# Patient Record
Sex: FEMALE | Race: WHITE | Employment: OTHER | URBAN - METROPOLITAN AREA
[De-identification: names, ages, dates, MRNs, and addresses within clinical notes are randomized per-mention and may not be internally consistent; named-entity substitution may affect disease eponyms.]

---

## 2017-02-09 ENCOUNTER — HOSPITAL ENCOUNTER (OUTPATIENT)
Dept: MRI IMAGING | Age: 77
Discharge: HOME | End: 2017-02-09
Attending: INTERNAL MEDICINE | Admitting: INTERNAL MEDICINE

## 2017-02-09 DIAGNOSIS — M54.16 RADICULOPATHY OF LUMBAR REGION: ICD-10-CM

## 2017-02-09 DIAGNOSIS — M54.16 LUMBAR RADICULOPATHY: ICD-10-CM

## 2017-02-23 ENCOUNTER — HOSPITAL ENCOUNTER (OUTPATIENT)
Dept: OTHER | Age: 77
Discharge: HOME | End: 2017-02-23
Attending: INTERNAL MEDICINE | Admitting: INTERNAL MEDICINE

## 2017-02-23 DIAGNOSIS — R06.02 BREATH SHORTNESS: ICD-10-CM

## 2017-05-31 ENCOUNTER — HOSPITAL ENCOUNTER (OUTPATIENT)
Dept: VASCULAR LAB | Age: 77
Discharge: HOME | End: 2017-05-31
Attending: INTERNAL MEDICINE | Admitting: INTERNAL MEDICINE

## 2017-05-31 ENCOUNTER — OFFICE VISIT (OUTPATIENT)
Dept: CARDIOLOGY CLINIC | Age: 77
End: 2017-05-31

## 2017-05-31 VITALS
RESPIRATION RATE: 16 BRPM | BODY MASS INDEX: 36.8 KG/M2 | DIASTOLIC BLOOD PRESSURE: 80 MMHG | HEART RATE: 82 BPM | WEIGHT: 200 LBS | HEIGHT: 62 IN | SYSTOLIC BLOOD PRESSURE: 130 MMHG

## 2017-05-31 DIAGNOSIS — R06.02 SHORTNESS OF BREATH: ICD-10-CM

## 2017-05-31 DIAGNOSIS — Z86.718 PERSONAL HISTORY OF OTHER VENOUS THROMBOSIS AND EMBOLISM: ICD-10-CM

## 2017-05-31 DIAGNOSIS — G47.33 OSA (OBSTRUCTIVE SLEEP APNEA): ICD-10-CM

## 2017-05-31 DIAGNOSIS — R60.9 EDEMA, UNSPECIFIED TYPE: Primary | ICD-10-CM

## 2017-05-31 DIAGNOSIS — I10 ESSENTIAL HYPERTENSION: ICD-10-CM

## 2017-05-31 DIAGNOSIS — C64.2 MALIGNANT NEOPLASM OF LEFT KIDNEY (HCC): ICD-10-CM

## 2017-05-31 DIAGNOSIS — I35.1 AORTIC VALVE INSUFFICIENCY, UNSPECIFIED ETIOLOGY: Primary | ICD-10-CM

## 2017-05-31 PROCEDURE — G8399 PT W/DXA RESULTS DOCUMENT: HCPCS | Performed by: NURSE PRACTITIONER

## 2017-05-31 PROCEDURE — 4040F PNEUMOC VAC/ADMIN/RCVD: CPT | Performed by: NURSE PRACTITIONER

## 2017-05-31 PROCEDURE — G8417 CALC BMI ABV UP PARAM F/U: HCPCS | Performed by: NURSE PRACTITIONER

## 2017-05-31 PROCEDURE — G8427 DOCREV CUR MEDS BY ELIG CLIN: HCPCS | Performed by: NURSE PRACTITIONER

## 2017-05-31 PROCEDURE — 1123F ACP DISCUSS/DSCN MKR DOCD: CPT | Performed by: NURSE PRACTITIONER

## 2017-05-31 PROCEDURE — 1036F TOBACCO NON-USER: CPT | Performed by: NURSE PRACTITIONER

## 2017-05-31 PROCEDURE — 99214 OFFICE O/P EST MOD 30 MIN: CPT | Performed by: NURSE PRACTITIONER

## 2017-05-31 PROCEDURE — 1090F PRES/ABSN URINE INCON ASSESS: CPT | Performed by: NURSE PRACTITIONER

## 2017-06-22 ENCOUNTER — HOSPITAL ENCOUNTER (OUTPATIENT)
Dept: NON INVASIVE DIAGNOSTICS | Age: 77
Discharge: HOME | End: 2017-06-22
Attending: INTERNAL MEDICINE | Admitting: INTERNAL MEDICINE

## 2017-06-22 ENCOUNTER — OFFICE VISIT (OUTPATIENT)
Dept: CARDIOLOGY CLINIC | Age: 77
End: 2017-06-22

## 2017-06-22 VITALS
OXYGEN SATURATION: 96 % | HEART RATE: 88 BPM | DIASTOLIC BLOOD PRESSURE: 60 MMHG | WEIGHT: 203 LBS | HEIGHT: 62 IN | SYSTOLIC BLOOD PRESSURE: 110 MMHG | BODY MASS INDEX: 37.36 KG/M2

## 2017-06-22 DIAGNOSIS — C64.2 MALIGNANT NEOPLASM OF LEFT KIDNEY (HCC): ICD-10-CM

## 2017-06-22 DIAGNOSIS — G47.33 OSA (OBSTRUCTIVE SLEEP APNEA): ICD-10-CM

## 2017-06-22 DIAGNOSIS — I35.1 AORTIC VALVE INSUFFICIENCY, UNSPECIFIED ETIOLOGY: Primary | ICD-10-CM

## 2017-06-22 DIAGNOSIS — R06.02 SHORTNESS OF BREATH: ICD-10-CM

## 2017-06-22 DIAGNOSIS — I10 ESSENTIAL HYPERTENSION: ICD-10-CM

## 2017-06-22 DIAGNOSIS — Z86.718 PERSONAL HISTORY OF OTHER VENOUS THROMBOSIS AND EMBOLISM: ICD-10-CM

## 2017-06-22 LAB
LEFT VENTRICULAR EJECTION FRACTION HIGH VALUE: 55 %
LEFT VENTRICULAR EJECTION FRACTION MODE: NORMAL
LV EF: 50 %
LV EF: 53 %
LVEF MODALITY: NORMAL

## 2017-06-22 PROCEDURE — G8417 CALC BMI ABV UP PARAM F/U: HCPCS | Performed by: NURSE PRACTITIONER

## 2017-06-22 PROCEDURE — G8427 DOCREV CUR MEDS BY ELIG CLIN: HCPCS | Performed by: NURSE PRACTITIONER

## 2017-06-22 PROCEDURE — 1123F ACP DISCUSS/DSCN MKR DOCD: CPT | Performed by: NURSE PRACTITIONER

## 2017-06-22 PROCEDURE — 99214 OFFICE O/P EST MOD 30 MIN: CPT | Performed by: NURSE PRACTITIONER

## 2017-06-22 PROCEDURE — 1036F TOBACCO NON-USER: CPT | Performed by: NURSE PRACTITIONER

## 2017-06-22 PROCEDURE — 4040F PNEUMOC VAC/ADMIN/RCVD: CPT | Performed by: NURSE PRACTITIONER

## 2017-06-22 PROCEDURE — G8399 PT W/DXA RESULTS DOCUMENT: HCPCS | Performed by: NURSE PRACTITIONER

## 2017-06-22 PROCEDURE — 1090F PRES/ABSN URINE INCON ASSESS: CPT | Performed by: NURSE PRACTITIONER

## 2017-08-08 ENCOUNTER — HOSPITAL ENCOUNTER (OUTPATIENT)
Dept: PULMONOLOGY | Age: 77
Discharge: HOME | End: 2017-08-08
Attending: INTERNAL MEDICINE | Admitting: INTERNAL MEDICINE

## 2017-08-08 VITALS — HEART RATE: 90 BPM | OXYGEN SATURATION: 96 %

## 2017-08-08 DIAGNOSIS — J44.9 CHRONIC OBSTRUCTIVE PULMONARY DISEASE (HCC): ICD-10-CM

## 2017-08-08 RX ORDER — ALBUTEROL SULFATE 90 UG/1
4 AEROSOL, METERED RESPIRATORY (INHALATION) ONCE
Status: CANCELLED | OUTPATIENT
Start: 2017-08-08

## 2017-11-03 ENCOUNTER — HOSPITAL ENCOUNTER (OUTPATIENT)
Dept: OTHER | Age: 77
Discharge: HOME | End: 2017-11-03
Attending: INTERNAL MEDICINE | Admitting: INTERNAL MEDICINE

## 2017-11-03 DIAGNOSIS — M79.671 BILATERAL FOOT PAIN: ICD-10-CM

## 2017-11-03 DIAGNOSIS — M79.672 BILATERAL FOOT PAIN: ICD-10-CM

## 2017-11-13 ENCOUNTER — HOSPITAL ENCOUNTER (OUTPATIENT)
Dept: GENERAL RADIOLOGY | Age: 77
Discharge: HOME | End: 2017-11-13
Attending: INTERNAL MEDICINE | Admitting: INTERNAL MEDICINE

## 2017-11-13 DIAGNOSIS — N95.1 MENOPAUSAL STATE: ICD-10-CM

## 2017-11-13 DIAGNOSIS — N95.1 FEMALE CLIMACTERIC STATE: ICD-10-CM

## 2017-12-18 ENCOUNTER — OFFICE VISIT (OUTPATIENT)
Dept: CARDIOLOGY CLINIC | Age: 77
End: 2017-12-18

## 2017-12-18 VITALS
DIASTOLIC BLOOD PRESSURE: 82 MMHG | WEIGHT: 201 LBS | BODY MASS INDEX: 36.76 KG/M2 | HEART RATE: 82 BPM | SYSTOLIC BLOOD PRESSURE: 132 MMHG | OXYGEN SATURATION: 98 %

## 2017-12-18 DIAGNOSIS — G47.33 OSA (OBSTRUCTIVE SLEEP APNEA): ICD-10-CM

## 2017-12-18 DIAGNOSIS — C64.2 MALIGNANT NEOPLASM OF LEFT KIDNEY (HCC): ICD-10-CM

## 2017-12-18 DIAGNOSIS — I10 ESSENTIAL HYPERTENSION: ICD-10-CM

## 2017-12-18 DIAGNOSIS — I35.1 AORTIC VALVE INSUFFICIENCY, ETIOLOGY OF CARDIAC VALVE DISEASE UNSPECIFIED: Primary | ICD-10-CM

## 2017-12-18 PROCEDURE — G8399 PT W/DXA RESULTS DOCUMENT: HCPCS | Performed by: NURSE PRACTITIONER

## 2017-12-18 PROCEDURE — G8417 CALC BMI ABV UP PARAM F/U: HCPCS | Performed by: NURSE PRACTITIONER

## 2017-12-18 PROCEDURE — G8427 DOCREV CUR MEDS BY ELIG CLIN: HCPCS | Performed by: NURSE PRACTITIONER

## 2017-12-18 PROCEDURE — 99214 OFFICE O/P EST MOD 30 MIN: CPT | Performed by: NURSE PRACTITIONER

## 2017-12-18 PROCEDURE — 1090F PRES/ABSN URINE INCON ASSESS: CPT | Performed by: NURSE PRACTITIONER

## 2017-12-18 PROCEDURE — 1036F TOBACCO NON-USER: CPT | Performed by: NURSE PRACTITIONER

## 2017-12-18 PROCEDURE — 4040F PNEUMOC VAC/ADMIN/RCVD: CPT | Performed by: NURSE PRACTITIONER

## 2017-12-18 PROCEDURE — 1123F ACP DISCUSS/DSCN MKR DOCD: CPT | Performed by: NURSE PRACTITIONER

## 2017-12-18 PROCEDURE — G8484 FLU IMMUNIZE NO ADMIN: HCPCS | Performed by: NURSE PRACTITIONER

## 2017-12-18 NOTE — PROGRESS NOTES
5 [facility]       Referring Provider:  Shanel Cantu MD     Chief Complaint   Patient presents with    Follow-up     echo prior       Patient is being seen today due to a recent hospital visit: Adalid Lopez is a 69 y.o. who presents ED on 17 with complaint of a fall. Patient states she tripped in a hole in the concrete. Patient that she fell forward and hit both knees on the ground. Patient states she fell forward and landed on her right wrist. Patient states she also hit her nose. Denies any loss of consciousness. Patient states she was seen after the fall at urgent care. Patient states she had x-rays performed of her right wrist which were unremarkable. States wrist has improved since then. States she still has a slight pain to her bilateral knees. She stated she has chest pain, EKG showed ST, and trop. Enzyme were negative. Patient was discharge from the ER in stable condition. History of Present Illness:   Mrs Apurva Alicia is seen today as a follow up AI and HTN. She has a history of mild- moderate AI, ALEXI, nephrectomy for cancer, colon resection. Today, she states she is doing well. She denies any chest pain, palpitations, SOB, dizziness, or edema. Past Medical History:   has a past medical history of Aortic insufficiency; Aortic insufficiency; Cancer ([address] Clot; Fx ankle; Hypothyroidism; Kidney lesion; and Valvular disease. Surgical History:   has a past surgical history that includes total nephrectomy; Colonoscopy; Endoscopy, colon, diagnostic; Appendectomy;  section; Abdomen surgery; and colectomy (12). Social History:   reports that she quit smoking about 35 years ago. She has a 38.00 pack-year smoking history. She has never used smokeless tobacco. She reports that she drinks alcohol. She reports that she does not use drugs. Family History:  family history includes Heart Disease in her father and mother.      Home Medications:  Outpatient Encounter Prescriptions as of 12/18/2017   Medication Sig Dispense Refill    RaNITidine HCl (ZANTAC PO) Take by mouth      gabapentin (NEURONTIN) 100 MG capsule Take 100 mg by mouth 3 times daily      rivaroxaban (XARELTO) 10 MG TABS tablet Take 20 mg by mouth daily (with breakfast)       UNABLE TO FIND Naturethroid 62mcg       LORazepam (ATIVAN) 1 MG tablet Take 2 mg by mouth as needed for Anxiety Pt takes 1/4 tab as needed      loratadine (CLARITIN) 10 MG tablet Take 10 mg by mouth as needed      fluticasone (FLONASE) 50 MCG/ACT nasal spray 1 spray by Nasal route daily.  Magnesium 250 MG TABS Take 300 mg by mouth.  Cholecalciferol (VITAMIN D) 2000 UNIT TABS Take 10,000 Units by mouth daily       [DISCONTINUED] pantoprazole (PROTONIX) 20 MG tablet Take 20 mg by mouth daily       No facility-administered encounter medications on file as of 12/18/2017. Allergies:  Bupropion and Wellbutrin [bupropion hcl]     Review of Systems:   · Constitutional: there has been no unanticipated weight loss. There's been no change in energy level, sleep pattern, or activity level. · Eyes: No visual changes or diplopia. No scleral icterus. · ENT: No Headaches, hearing loss or vertigo. No mouth sores or sore throat. · Cardiovascular: Reviewed in HPI. · Respiratory: No cough or wheezing, no sputum production. No hematemesis. KRAUS. · Gastrointestinal: No abdominal pain, appetite loss, blood in stools. No change in bowel or bladder habits. · Genitourinary: No dysuria, trouble voiding, or hematuria. Only has the right kidney. · Musculoskeletal:  No gait disturbance, weakness or joint complaints. · Integumentary: No rash or pruritis. · Neurological: No headache, diplopia, change in muscle strength, numbness or tingling. No change in gait, balance, coordination, mood, affect, memory, mentation, behavior. · Psychiatric: No anxiety, no depression.   · Endocrine: No malaise, insufficiency: 6/17 last ECHO showed mild to moderate AR,  Moderate AR by echo in 10/15--  Unchanged from previous ECHO in 2012. Normal cath 10/15      2. ALEXI--wears cpap   3. Hypertension-- /82   Pulse 82   Wt 201 lb (91.2 kg)   SpO2 98%   Breastfeeding? No   BMI 36.76 kg/m²    Controlled on current medications  4. Renal cancer-S/p left nephrectomy due to cancer in 2006. Plan:  1. Continue all medications  2.   Follow up in six months    Thank you for allowing me to participate in the care of this individual.  Martha Peck CNP

## 2018-06-12 ENCOUNTER — OFFICE VISIT (OUTPATIENT)
Dept: INTERNAL MEDICINE CLINIC | Age: 78
End: 2018-06-12

## 2018-06-12 VITALS
SYSTOLIC BLOOD PRESSURE: 136 MMHG | BODY MASS INDEX: 37.36 KG/M2 | WEIGHT: 203 LBS | HEART RATE: 88 BPM | DIASTOLIC BLOOD PRESSURE: 80 MMHG | HEIGHT: 62 IN

## 2018-06-12 DIAGNOSIS — E03.9 ACQUIRED HYPOTHYROIDISM: ICD-10-CM

## 2018-06-12 DIAGNOSIS — Z86.718 HISTORY OF RECURRENT DEEP VEIN THROMBOSIS (DVT): ICD-10-CM

## 2018-06-12 DIAGNOSIS — M81.0 AGE-RELATED OSTEOPOROSIS WITHOUT CURRENT PATHOLOGICAL FRACTURE: ICD-10-CM

## 2018-06-12 DIAGNOSIS — Z79.01 CHRONIC ANTICOAGULATION: ICD-10-CM

## 2018-06-12 DIAGNOSIS — G47.00 INSOMNIA, UNSPECIFIED TYPE: ICD-10-CM

## 2018-06-12 DIAGNOSIS — G89.29 CHRONIC HIP PAIN, UNSPECIFIED LATERALITY: ICD-10-CM

## 2018-06-12 DIAGNOSIS — Z85.528 H/O RENAL CELL CARCINOMA: ICD-10-CM

## 2018-06-12 DIAGNOSIS — Z23 NEED FOR PROPHYLACTIC VACCINATION AGAINST DIPHTHERIA-TETANUS-PERTUSSIS (DTP): ICD-10-CM

## 2018-06-12 DIAGNOSIS — M25.559 CHRONIC HIP PAIN, UNSPECIFIED LATERALITY: ICD-10-CM

## 2018-06-12 DIAGNOSIS — Z90.5 S/P NEPHRECTOMY: ICD-10-CM

## 2018-06-12 DIAGNOSIS — K21.9 GASTROESOPHAGEAL REFLUX DISEASE, ESOPHAGITIS PRESENCE NOT SPECIFIED: Primary | ICD-10-CM

## 2018-06-12 DIAGNOSIS — M15.9 PRIMARY OSTEOARTHRITIS INVOLVING MULTIPLE JOINTS: ICD-10-CM

## 2018-06-12 PROBLEM — E07.9 THYROID DISEASE: Status: ACTIVE | Noted: 2018-06-12

## 2018-06-12 PROCEDURE — G8399 PT W/DXA RESULTS DOCUMENT: HCPCS | Performed by: INTERNAL MEDICINE

## 2018-06-12 PROCEDURE — 1090F PRES/ABSN URINE INCON ASSESS: CPT | Performed by: INTERNAL MEDICINE

## 2018-06-12 PROCEDURE — 1036F TOBACCO NON-USER: CPT | Performed by: INTERNAL MEDICINE

## 2018-06-12 PROCEDURE — 4040F PNEUMOC VAC/ADMIN/RCVD: CPT | Performed by: INTERNAL MEDICINE

## 2018-06-12 PROCEDURE — G8427 DOCREV CUR MEDS BY ELIG CLIN: HCPCS | Performed by: INTERNAL MEDICINE

## 2018-06-12 PROCEDURE — 99204 OFFICE O/P NEW MOD 45 MIN: CPT | Performed by: INTERNAL MEDICINE

## 2018-06-12 PROCEDURE — G8417 CALC BMI ABV UP PARAM F/U: HCPCS | Performed by: INTERNAL MEDICINE

## 2018-06-12 PROCEDURE — 1123F ACP DISCUSS/DSCN MKR DOCD: CPT | Performed by: INTERNAL MEDICINE

## 2018-06-12 RX ORDER — NYSTATIN 100000 U/G
CREAM TOPICAL
COMMUNITY
End: 2019-05-08

## 2018-06-12 RX ORDER — LORAZEPAM 1 MG/1
1 TABLET ORAL
COMMUNITY
End: 2018-06-12 | Stop reason: CLARIF

## 2018-06-12 RX ORDER — CLOTRIMAZOLE AND BETAMETHASONE DIPROPIONATE 10; .64 MG/G; MG/G
CREAM TOPICAL PRN
COMMUNITY
End: 2022-09-21 | Stop reason: SDUPTHER

## 2018-06-12 RX ORDER — FLUTICASONE PROPIONATE 50 MCG
1 SPRAY, SUSPENSION (ML) NASAL
COMMUNITY
End: 2018-06-12 | Stop reason: CLARIF

## 2018-06-12 RX ORDER — PREDNISOLONE ACETATE 10 MG/ML
1 SUSPENSION/ DROPS OPHTHALMIC 4 TIMES DAILY
COMMUNITY
End: 2019-05-08

## 2018-06-12 RX ORDER — GABAPENTIN 100 MG/1
100 CAPSULE ORAL
COMMUNITY
End: 2018-06-12 | Stop reason: SDUPTHER

## 2018-06-12 RX ORDER — GABAPENTIN 300 MG/1
300 CAPSULE ORAL
COMMUNITY
End: 2018-06-12

## 2018-06-12 RX ORDER — LEVOTHYROXINE AND LIOTHYRONINE 38; 9 UG/1; UG/1
TABLET ORAL
COMMUNITY
End: 2018-08-02 | Stop reason: DRUGHIGH

## 2018-06-12 RX ORDER — GABAPENTIN 100 MG/1
CAPSULE ORAL
Qty: 150 CAPSULE | Refills: 5 | Status: SHIPPED | OUTPATIENT
Start: 2018-06-12 | End: 2020-08-18

## 2018-06-12 RX ORDER — OMEPRAZOLE 40 MG/1
40 CAPSULE, DELAYED RELEASE ORAL
Qty: 30 CAPSULE | Refills: 1 | Status: SHIPPED | OUTPATIENT
Start: 2018-06-12 | End: 2018-07-31

## 2018-06-12 RX ORDER — LEVOTHYROXINE AND LIOTHYRONINE 19; 4.5 UG/1; UG/1
60 TABLET ORAL
COMMUNITY
End: 2018-06-12 | Stop reason: CLARIF

## 2018-06-12 ASSESSMENT — ENCOUNTER SYMPTOMS
COUGH: 0
SHORTNESS OF BREATH: 1

## 2018-06-14 PROBLEM — M81.0 AGE-RELATED OSTEOPOROSIS WITHOUT CURRENT PATHOLOGICAL FRACTURE: Status: ACTIVE | Noted: 2018-06-14

## 2018-06-14 PROBLEM — M15.9 PRIMARY OSTEOARTHRITIS INVOLVING MULTIPLE JOINTS: Status: ACTIVE | Noted: 2018-06-14

## 2018-06-14 PROBLEM — M15.0 PRIMARY OSTEOARTHRITIS INVOLVING MULTIPLE JOINTS: Status: ACTIVE | Noted: 2018-06-14

## 2018-06-14 PROBLEM — Z90.5 S/P NEPHRECTOMY: Status: ACTIVE | Noted: 2018-06-14

## 2018-06-14 PROBLEM — E03.9 ACQUIRED HYPOTHYROIDISM: Status: ACTIVE | Noted: 2018-06-12

## 2018-06-14 PROBLEM — G47.00 INSOMNIA: Status: ACTIVE | Noted: 2018-06-14

## 2018-06-15 ENCOUNTER — TELEPHONE (OUTPATIENT)
Dept: INTERNAL MEDICINE CLINIC | Age: 78
End: 2018-06-15

## 2018-06-18 ENCOUNTER — OFFICE VISIT (OUTPATIENT)
Dept: CARDIOLOGY CLINIC | Age: 78
End: 2018-06-18

## 2018-06-18 VITALS
HEART RATE: 68 BPM | BODY MASS INDEX: 37.76 KG/M2 | SYSTOLIC BLOOD PRESSURE: 120 MMHG | DIASTOLIC BLOOD PRESSURE: 80 MMHG | WEIGHT: 205.2 LBS | HEIGHT: 62 IN

## 2018-06-18 DIAGNOSIS — G47.33 OSA ON CPAP: ICD-10-CM

## 2018-06-18 DIAGNOSIS — I35.1 AORTIC VALVE INSUFFICIENCY, ETIOLOGY OF CARDIAC VALVE DISEASE UNSPECIFIED: Primary | ICD-10-CM

## 2018-06-18 DIAGNOSIS — I10 ESSENTIAL HYPERTENSION: ICD-10-CM

## 2018-06-18 DIAGNOSIS — Z99.89 OSA ON CPAP: ICD-10-CM

## 2018-06-18 PROCEDURE — 4040F PNEUMOC VAC/ADMIN/RCVD: CPT | Performed by: NURSE PRACTITIONER

## 2018-06-18 PROCEDURE — 1090F PRES/ABSN URINE INCON ASSESS: CPT | Performed by: NURSE PRACTITIONER

## 2018-06-18 PROCEDURE — 1036F TOBACCO NON-USER: CPT | Performed by: NURSE PRACTITIONER

## 2018-06-18 PROCEDURE — G8427 DOCREV CUR MEDS BY ELIG CLIN: HCPCS | Performed by: NURSE PRACTITIONER

## 2018-06-18 PROCEDURE — G8417 CALC BMI ABV UP PARAM F/U: HCPCS | Performed by: NURSE PRACTITIONER

## 2018-06-18 PROCEDURE — 1123F ACP DISCUSS/DSCN MKR DOCD: CPT | Performed by: NURSE PRACTITIONER

## 2018-06-18 PROCEDURE — G8399 PT W/DXA RESULTS DOCUMENT: HCPCS | Performed by: NURSE PRACTITIONER

## 2018-06-18 PROCEDURE — 99214 OFFICE O/P EST MOD 30 MIN: CPT | Performed by: NURSE PRACTITIONER

## 2018-07-10 ENCOUNTER — TELEPHONE (OUTPATIENT)
Dept: INTERNAL MEDICINE CLINIC | Age: 78
End: 2018-07-10

## 2018-07-11 ENCOUNTER — OFFICE VISIT (OUTPATIENT)
Dept: INTERNAL MEDICINE CLINIC | Age: 78
End: 2018-07-11

## 2018-07-11 VITALS
SYSTOLIC BLOOD PRESSURE: 134 MMHG | DIASTOLIC BLOOD PRESSURE: 80 MMHG | WEIGHT: 204 LBS | HEART RATE: 88 BPM | OXYGEN SATURATION: 92 % | BODY MASS INDEX: 37.92 KG/M2

## 2018-07-11 DIAGNOSIS — J06.9 VIRAL URI WITH COUGH: Primary | ICD-10-CM

## 2018-07-11 PROCEDURE — 1036F TOBACCO NON-USER: CPT | Performed by: INTERNAL MEDICINE

## 2018-07-11 PROCEDURE — 99212 OFFICE O/P EST SF 10 MIN: CPT | Performed by: INTERNAL MEDICINE

## 2018-07-11 PROCEDURE — 1101F PT FALLS ASSESS-DOCD LE1/YR: CPT | Performed by: INTERNAL MEDICINE

## 2018-07-11 PROCEDURE — 1123F ACP DISCUSS/DSCN MKR DOCD: CPT | Performed by: INTERNAL MEDICINE

## 2018-07-11 PROCEDURE — 1090F PRES/ABSN URINE INCON ASSESS: CPT | Performed by: INTERNAL MEDICINE

## 2018-07-11 PROCEDURE — 4040F PNEUMOC VAC/ADMIN/RCVD: CPT | Performed by: INTERNAL MEDICINE

## 2018-07-11 PROCEDURE — G8417 CALC BMI ABV UP PARAM F/U: HCPCS | Performed by: INTERNAL MEDICINE

## 2018-07-11 PROCEDURE — G8427 DOCREV CUR MEDS BY ELIG CLIN: HCPCS | Performed by: INTERNAL MEDICINE

## 2018-07-11 PROCEDURE — G8399 PT W/DXA RESULTS DOCUMENT: HCPCS | Performed by: INTERNAL MEDICINE

## 2018-07-11 RX ORDER — BENZONATATE 100 MG/1
CAPSULE ORAL
Qty: 30 CAPSULE | Refills: 0 | Status: SHIPPED | OUTPATIENT
Start: 2018-07-11 | End: 2018-07-18

## 2018-07-11 NOTE — PROGRESS NOTES
TOPICAL ROUTE 2 TIMES PER DAY IN THE MORNING AND EVENING FOR 2 WEEKS      nystatin (MYCOSTATIN) 906253 UNIT/GM cream nystatin 100,000 unit/gram topical cream      hydrocortisone (PROCTOSOL HC) 2.5 % rectal cream Proctosol HC 2.5 % topical cream perineal applicator      Cholecalciferol (VITAMIN D3) 1000 units CAPS Vitamin D3   Take 68129 iu once daily      prednisoLONE acetate (PRED FORTE) 1 % ophthalmic suspension 1 drop 4 times daily      omeprazole (PRILOSEC) 40 MG delayed release capsule Take 1 capsule by mouth daily (with breakfast) 30 capsule 1    gabapentin (NEURONTIN) 100 MG capsule 200 in am and 300 at night. . 150 capsule 5    RaNITidine HCl (ZANTAC PO) Take 150 mg by mouth 2 times daily as needed      rivaroxaban (XARELTO) 10 MG TABS tablet Take 20 mg by mouth daily (with breakfast)       LORazepam (ATIVAN) 1 MG tablet Take 2 mg by mouth as needed for Anxiety Pt takes 1/4 tab as needed      loratadine (CLARITIN) 10 MG tablet Take 10 mg by mouth as needed      fluticasone (FLONASE) 50 MCG/ACT nasal spray 1 spray by Nasal route daily. Review of Systems    As per HPI. OBJECTIVE:  Vitals:    07/11/18 1535   BP: 134/80   Pulse: 88   SpO2: 92%   Weight: 204 lb (92.5 kg)     Physical Exam   Constitutional: She appears well-developed. No distress. HENT:   Right Ear: Ear canal normal. Tympanic membrane is bulging. Tympanic membrane is not erythematous. Left Ear: Ear canal normal. Tympanic membrane is bulging. Tympanic membrane is not erythematous. No decreased hearing is noted. Nose: Right sinus exhibits no maxillary sinus tenderness. Left sinus exhibits no maxillary sinus tenderness. Eyes: Conjunctivae are normal.   Cardiovascular: Normal rate and regular rhythm. Pulmonary/Chest: Effort normal and breath sounds normal. She has no wheezes. She has no rales. Skin: No rash noted.

## 2018-07-31 ENCOUNTER — OFFICE VISIT (OUTPATIENT)
Dept: INTERNAL MEDICINE CLINIC | Age: 78
End: 2018-07-31

## 2018-07-31 ENCOUNTER — HOSPITAL ENCOUNTER (OUTPATIENT)
Dept: OTHER | Age: 78
Discharge: HOME | End: 2018-07-31
Attending: INTERNAL MEDICINE | Admitting: INTERNAL MEDICINE

## 2018-07-31 VITALS
SYSTOLIC BLOOD PRESSURE: 130 MMHG | HEART RATE: 88 BPM | WEIGHT: 201 LBS | DIASTOLIC BLOOD PRESSURE: 74 MMHG | OXYGEN SATURATION: 98 % | BODY MASS INDEX: 37.36 KG/M2

## 2018-07-31 DIAGNOSIS — R20.2 TINGLING: ICD-10-CM

## 2018-07-31 DIAGNOSIS — R05.9 COUGH: ICD-10-CM

## 2018-07-31 DIAGNOSIS — R00.2 POUNDING HEARTBEAT: ICD-10-CM

## 2018-07-31 DIAGNOSIS — K21.9 GASTROESOPHAGEAL REFLUX DISEASE, ESOPHAGITIS PRESENCE NOT SPECIFIED: Primary | ICD-10-CM

## 2018-07-31 DIAGNOSIS — E03.9 ACQUIRED HYPOTHYROIDISM: ICD-10-CM

## 2018-07-31 PROCEDURE — 99214 OFFICE O/P EST MOD 30 MIN: CPT | Performed by: INTERNAL MEDICINE

## 2018-07-31 PROCEDURE — 1036F TOBACCO NON-USER: CPT | Performed by: INTERNAL MEDICINE

## 2018-07-31 PROCEDURE — 1090F PRES/ABSN URINE INCON ASSESS: CPT | Performed by: INTERNAL MEDICINE

## 2018-07-31 PROCEDURE — G8417 CALC BMI ABV UP PARAM F/U: HCPCS | Performed by: INTERNAL MEDICINE

## 2018-07-31 PROCEDURE — 1123F ACP DISCUSS/DSCN MKR DOCD: CPT | Performed by: INTERNAL MEDICINE

## 2018-07-31 PROCEDURE — 1101F PT FALLS ASSESS-DOCD LE1/YR: CPT | Performed by: INTERNAL MEDICINE

## 2018-07-31 PROCEDURE — 4040F PNEUMOC VAC/ADMIN/RCVD: CPT | Performed by: INTERNAL MEDICINE

## 2018-07-31 PROCEDURE — G8399 PT W/DXA RESULTS DOCUMENT: HCPCS | Performed by: INTERNAL MEDICINE

## 2018-07-31 PROCEDURE — G8427 DOCREV CUR MEDS BY ELIG CLIN: HCPCS | Performed by: INTERNAL MEDICINE

## 2018-07-31 PROCEDURE — 93000 ELECTROCARDIOGRAM COMPLETE: CPT | Performed by: INTERNAL MEDICINE

## 2018-07-31 ASSESSMENT — ENCOUNTER SYMPTOMS
WHEEZING: 0
SHORTNESS OF BREATH: 1
COUGH: 1

## 2018-07-31 NOTE — PROGRESS NOTES
pain and leg swelling. Skin: Negative for rash. Neurological: Negative for dizziness. As per HPI. Prior to Visit Medications    Medication Sig Taking? Authorizing Provider   MAGNESIUM PO Take by mouth Yes Historical Provider, MD   thyroid (NAKUL THYROID) 60 MG tablet Chama Thyroid 60 mg tablet   Take 1 tablet every day by oral route for 30 days. Yes Historical Provider, MD   clotrimazole-betamethasone (LOTRISONE) 1-0.05 % cream clotrimazole-betamethasone 1 %-0.05 % topical cream   APPLY TO THE AFFECTED AND SURROUNDING AREAS OF SKIN BY TOPICAL ROUTE 2 TIMES PER DAY IN THE MORNING AND EVENING FOR 2 WEEKS Yes Historical Provider, MD   nystatin (MYCOSTATIN) 762082 UNIT/GM cream nystatin 100,000 unit/gram topical cream Yes Historical Provider, MD   hydrocortisone (PROCTOSOL HC) 2.5 % rectal cream Proctosol HC 2.5 % topical cream perineal applicator Yes Historical Provider, MD   Cholecalciferol (VITAMIN D3) 1000 units CAPS Vitamin D3   Take 77162 iu once daily Yes Historical Provider, MD   prednisoLONE acetate (PRED FORTE) 1 % ophthalmic suspension 1 drop 4 times daily Yes Historical Provider, MD   gabapentin (NEURONTIN) 100 MG capsule 200 in am and 300 at night. Petros Cobb MD   RaNITidine HCl (ZANTAC PO) Take 150 mg by mouth 2 times daily as needed Yes Historical Provider, MD   rivaroxaban (XARELTO) 10 MG TABS tablet Take 20 mg by mouth daily (with breakfast)  Yes Historical Provider, MD   LORazepam (ATIVAN) 1 MG tablet Take 2 mg by mouth as needed for Anxiety Pt takes 1/4 tab as needed Yes Historical Provider, MD   loratadine (CLARITIN) 10 MG tablet Take 10 mg by mouth as needed Yes Historical Provider, MD   fluticasone (FLONASE) 50 MCG/ACT nasal spray 1 spray by Nasal route daily.    Yes Historical Provider, MD   albuterol sulfate (PROAIR RESPICLICK) 232 (90 Base) MCG/ACT aerosol powder inhalation Inhale into the lungs  Historical Provider, MD     History   Smoking Status    Former Smoker    Packs/day: 2.00    Years: 19.00    Quit date: 10/29/1982   Smokeless Tobacco    Never Used       OBJECTIVE:  BP Readings from Last 3 Encounters:   07/31/18 130/74   07/11/18 134/80   06/18/18 120/80      Wt Readings from Last 3 Encounters:   07/31/18 201 lb (91.2 kg)   07/11/18 204 lb (92.5 kg)   06/18/18 205 lb 3.2 oz (93.1 kg)     Vitals:    07/31/18 1110   BP: 130/74   Pulse: 88   SpO2: 98%   Weight: 201 lb (91.2 kg)     Body mass index is 37.36 kg/m². Physical Exam   HENT:   Mouth/Throat: Oropharynx is clear and moist.   Eyes: Pupils are equal, round, and reactive to light. Neck: Normal range of motion. Neck supple. No thyromegaly present. Cardiovascular: Normal rate, regular rhythm and intact distal pulses. Pulmonary/Chest: Effort normal and breath sounds normal.   Abdominal: Soft. Bowel sounds are normal. She exhibits no mass. There is no hepatosplenomegaly. There is no tenderness. Musculoskeletal: She exhibits no edema. Lymphadenopathy:     She has no cervical adenopathy. Neurological: She is alert. Skin: Skin is warm and dry. No pallor. Psychiatric: She has a normal mood and affect.

## 2018-08-02 ENCOUNTER — PATIENT MESSAGE (OUTPATIENT)
Dept: INTERNAL MEDICINE CLINIC | Age: 78
End: 2018-08-02

## 2018-08-02 ENCOUNTER — TELEPHONE (OUTPATIENT)
Dept: INTERNAL MEDICINE CLINIC | Age: 78
End: 2018-08-02

## 2018-08-02 RX ORDER — LEVOTHYROXINE AND LIOTHYRONINE 38; 9 UG/1; UG/1
TABLET ORAL
Qty: 32 TABLET | Refills: 1 | Status: SHIPPED | OUTPATIENT
Start: 2018-08-02 | End: 2019-05-08

## 2018-08-02 NOTE — TELEPHONE ENCOUNTER
[phone] (home) [phone] (work)  Left message for patient to call office back.    Sent patient message in [place]

## 2018-08-02 NOTE — TELEPHONE ENCOUNTER
From: Meghna Null  To: Swetha Michelle MD  Sent: 8/2/2018 12:42 PM EDT  Subject: Visit Follow-Up Question    Dr. Bertha Wade:    A question . .. did you order my TSH to be checked? I don't see a result for that. Just T3 Free.     Luciano Desai

## 2018-08-13 ENCOUNTER — PATIENT MESSAGE (OUTPATIENT)
Dept: INTERNAL MEDICINE CLINIC | Age: 78
End: 2018-08-13

## 2018-08-13 DIAGNOSIS — R53.83 EXHAUSTION: Primary | ICD-10-CM

## 2018-10-09 ENCOUNTER — HOSPITAL ENCOUNTER (OUTPATIENT)
Age: 78
Discharge: HOME | End: 2018-10-09
Payer: MEDICARE

## 2018-10-09 LAB
A/G RATIO: 1.2 (ref 1.1–2.2)
ALBUMIN SERPL-MCNC: 3.8 G/DL (ref 3.4–5)
ALP BLD-CCNC: 105 U/L (ref 40–129)
ALT SERPL-CCNC: 14 U/L (ref 10–40)
ANION GAP SERPL CALCULATED.3IONS-SCNC: 10 MMOL/L (ref 3–16)
AST SERPL-CCNC: 17 U/L (ref 15–37)
BASOPHILS ABSOLUTE: 0 K/UL (ref 0–0.2)
BASOPHILS RELATIVE PERCENT: 0.5 %
BILIRUB SERPL-MCNC: 0.5 MG/DL (ref 0–1)
BUN BLDV-MCNC: 12 MG/DL (ref 7–20)
CALCIUM SERPL-MCNC: 9.4 MG/DL (ref 8.3–10.6)
CHLORIDE BLD-SCNC: 108 MMOL/L (ref 99–110)
CHOLESTEROL, TOTAL: 204 MG/DL (ref 0–199)
CO2: 24 MMOL/L (ref 21–32)
CREAT SERPL-MCNC: 0.9 MG/DL (ref 0.6–1.2)
EOSINOPHILS ABSOLUTE: 0.2 K/UL (ref 0–0.6)
EOSINOPHILS RELATIVE PERCENT: 3.7 %
GFR AFRICAN AMERICAN: >60
GFR NON-AFRICAN AMERICAN: >60
GLOBULIN: 3.2 G/DL
GLUCOSE BLD-MCNC: 95 MG/DL (ref 70–99)
HCT VFR BLD CALC: 42.3 % (ref 36–48)
HDLC SERPL-MCNC: 102 MG/DL (ref 40–60)
HEMOGLOBIN: 13.8 G/DL (ref 12–16)
LDL CHOLESTEROL CALCULATED: 84 MG/DL
LYMPHOCYTES ABSOLUTE: 1.7 K/UL (ref 1–5.1)
LYMPHOCYTES RELATIVE PERCENT: 26.5 %
MAGNESIUM: 2.1 MG/DL (ref 1.8–2.4)
MCH RBC QN AUTO: 29.3 PG (ref 26–34)
MCHC RBC AUTO-ENTMCNC: 32.5 G/DL (ref 31–36)
MCV RBC AUTO: 89.9 FL (ref 80–100)
MONOCYTES ABSOLUTE: 0.8 K/UL (ref 0–1.3)
MONOCYTES RELATIVE PERCENT: 12.5 %
NEUTROPHILS ABSOLUTE: 3.6 K/UL (ref 1.7–7.7)
NEUTROPHILS RELATIVE PERCENT: 56.8 %
PDW BLD-RTO: 15.5 % (ref 12.4–15.4)
PLATELET # BLD: 195 K/UL (ref 135–450)
PMV BLD AUTO: 9.5 FL (ref 5–10.5)
POTASSIUM SERPL-SCNC: 4.9 MMOL/L (ref 3.5–5.1)
PRO-BNP: 239 PG/ML (ref 0–449)
RBC # BLD: 4.7 M/UL (ref 4–5.2)
SEDIMENTATION RATE, ERYTHROCYTE: 25 MM/HR (ref 0–30)
SODIUM BLD-SCNC: 142 MMOL/L (ref 136–145)
T4 FREE: 0.8 NG/DL (ref 0.9–1.8)
TOTAL PROTEIN: 7 G/DL (ref 6.4–8.2)
TRIGL SERPL-MCNC: 92 MG/DL (ref 0–150)
TSH SERPL DL<=0.05 MIU/L-ACNC: 4.8 UIU/ML (ref 0.27–4.2)
VLDLC SERPL CALC-MCNC: 18 MG/DL
WBC # BLD: 6.3 K/UL (ref 4–11)

## 2018-10-09 PROCEDURE — 85652 RBC SED RATE AUTOMATED: CPT

## 2018-10-09 PROCEDURE — 80053 COMPREHEN METABOLIC PANEL: CPT

## 2018-10-09 PROCEDURE — 83036 HEMOGLOBIN GLYCOSYLATED A1C: CPT

## 2018-10-09 PROCEDURE — 84443 ASSAY THYROID STIM HORMONE: CPT

## 2018-10-09 PROCEDURE — 85025 COMPLETE CBC W/AUTO DIFF WBC: CPT

## 2018-10-09 PROCEDURE — 83735 ASSAY OF MAGNESIUM: CPT

## 2018-10-09 PROCEDURE — 80061 LIPID PANEL: CPT

## 2018-10-09 PROCEDURE — 36415 COLL VENOUS BLD VENIPUNCTURE: CPT

## 2018-10-09 PROCEDURE — 83880 ASSAY OF NATRIURETIC PEPTIDE: CPT

## 2018-10-09 PROCEDURE — 84439 ASSAY OF FREE THYROXINE: CPT

## 2018-10-10 LAB
ESTIMATED AVERAGE GLUCOSE: 125.5 MG/DL
HBA1C MFR BLD: 6 %

## 2019-02-26 ENCOUNTER — HOSPITAL ENCOUNTER (OUTPATIENT)
Dept: WOMENS IMAGING | Age: 79
Discharge: HOME | End: 2019-02-26
Payer: MEDICARE

## 2019-02-26 DIAGNOSIS — Z12.31 ENCOUNTER FOR SCREENING MAMMOGRAM FOR BREAST CANCER: ICD-10-CM

## 2019-02-26 PROCEDURE — 77063 BREAST TOMOSYNTHESIS BI: CPT

## 2019-03-19 ENCOUNTER — HOSPITAL ENCOUNTER (OUTPATIENT)
Dept: GENERAL RADIOLOGY | Age: 79
Discharge: HOME | End: 2019-03-19
Payer: MEDICARE

## 2019-03-19 ENCOUNTER — HOSPITAL ENCOUNTER (OUTPATIENT)
Age: 79
Discharge: HOME | End: 2019-03-19
Payer: MEDICARE

## 2019-03-19 DIAGNOSIS — R09.89 CHEST CONGESTION: ICD-10-CM

## 2019-03-19 PROCEDURE — 71046 X-RAY EXAM CHEST 2 VIEWS: CPT

## 2019-03-19 PROCEDURE — 70210 X-RAY EXAM OF SINUSES: CPT

## 2019-03-28 LAB
T3 FREE: 4.2
T4 FREE: 0.8
TSH SERPL DL<=0.05 MIU/L-ACNC: 5.95 UIU/ML

## 2019-05-08 ENCOUNTER — OFFICE VISIT (OUTPATIENT)
Dept: ENDOCRINOLOGY | Age: 79
End: 2019-05-08
Payer: MEDICARE

## 2019-05-08 VITALS
WEIGHT: 201 LBS | SYSTOLIC BLOOD PRESSURE: 138 MMHG | HEIGHT: 63 IN | DIASTOLIC BLOOD PRESSURE: 82 MMHG | OXYGEN SATURATION: 97 % | BODY MASS INDEX: 35.61 KG/M2 | HEART RATE: 84 BPM

## 2019-05-08 DIAGNOSIS — K21.00 GASTROESOPHAGEAL REFLUX DISEASE WITH ESOPHAGITIS: ICD-10-CM

## 2019-05-08 DIAGNOSIS — R73.03 PREDIABETES: ICD-10-CM

## 2019-05-08 DIAGNOSIS — E03.9 ACQUIRED HYPOTHYROIDISM: Primary | ICD-10-CM

## 2019-05-08 DIAGNOSIS — Z85.528 H/O RENAL CELL CARCINOMA: ICD-10-CM

## 2019-05-08 DIAGNOSIS — I35.1 AORTIC VALVE INSUFFICIENCY, ETIOLOGY OF CARDIAC VALVE DISEASE UNSPECIFIED: ICD-10-CM

## 2019-05-08 DIAGNOSIS — M81.0 AGE-RELATED OSTEOPOROSIS WITHOUT CURRENT PATHOLOGICAL FRACTURE: ICD-10-CM

## 2019-05-08 PROCEDURE — 99204 OFFICE O/P NEW MOD 45 MIN: CPT | Performed by: INTERNAL MEDICINE

## 2019-05-08 PROCEDURE — 4040F PNEUMOC VAC/ADMIN/RCVD: CPT | Performed by: INTERNAL MEDICINE

## 2019-05-08 PROCEDURE — 1036F TOBACCO NON-USER: CPT | Performed by: INTERNAL MEDICINE

## 2019-05-08 PROCEDURE — 1090F PRES/ABSN URINE INCON ASSESS: CPT | Performed by: INTERNAL MEDICINE

## 2019-05-08 PROCEDURE — G8427 DOCREV CUR MEDS BY ELIG CLIN: HCPCS | Performed by: INTERNAL MEDICINE

## 2019-05-08 PROCEDURE — G8417 CALC BMI ABV UP PARAM F/U: HCPCS | Performed by: INTERNAL MEDICINE

## 2019-05-08 PROCEDURE — G8399 PT W/DXA RESULTS DOCUMENT: HCPCS | Performed by: INTERNAL MEDICINE

## 2019-05-08 PROCEDURE — 1123F ACP DISCUSS/DSCN MKR DOCD: CPT | Performed by: INTERNAL MEDICINE

## 2019-05-08 RX ORDER — THYROID,PORK 90 MG
90 TABLET ORAL
Qty: 90 TABLET | Refills: 1 | Status: SHIPPED | OUTPATIENT
Start: 2019-05-08 | End: 2019-05-08 | Stop reason: SDUPTHER

## 2019-05-08 RX ORDER — LEVOTHYROXINE AND LIOTHYRONINE 57; 13.5 UG/1; UG/1
90 TABLET ORAL
Qty: 90 TABLET | Refills: 5 | Status: SHIPPED | OUTPATIENT
Start: 2019-05-08 | End: 2019-09-19
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
## 2019-05-23 ENCOUNTER — TELEPHONE (OUTPATIENT)
Dept: ENDOCRINOLOGY | Age: 79
End: 2019-05-23

## 2019-05-23 NOTE — TELEPHONE ENCOUNTER
Pt does not want to be on levothyroxine, pt states she's had thyroid problems and when she was on levothyroxine previously she had to take T3 as well. She would like to stay on armour thyroid but a lowered dose or take 1/2 tab some days. Pt said she feels sad, has not motivation, and not herself.

## 2019-05-23 NOTE — TELEPHONE ENCOUNTER
Pt started armour thyroid 2 wks ago, symptoms started 2 days ago. Pt said she is having \"soft stools\". Pt is also having full body fluttering, tingling, and shaking. Her hands were shaking last night. Pt symptoms last all day long. Pt stopped medication today and feels better. Previously pt was on nature thyroid 65mg. Monday, Wednesday, and Friday she would take an extra half tablet. Pt said she made no med changes, taking claritin PRN.       [phone]    FOV 8/8/19  LOV 5/8/19

## 2019-05-23 NOTE — TELEPHONE ENCOUNTER
Patient was previously taking approximately 88 milligrams of nature thyroid as she was taking 1 and a half tablets of Nature-Throid was .  Since Nature-Throid went on back order she was switched to [place] Thyroid approximately the same dose ---if she is having all these symptoms from [place] Thyroid I would recommend switching to levothyroxine if that is okay with her I will call in that prescription for her

## 2019-05-23 NOTE — TELEPHONE ENCOUNTER
Pt called and states she started her new medication [place] 90 MG for 2 weeks now and is causing loose stools, shakiness, fluttering in chest. She states she's stopped taking it

## 2019-07-30 LAB — TSH SERPL DL<=0.05 MIU/L-ACNC: 0.03 UIU/ML

## 2019-08-02 ENCOUNTER — TELEPHONE (OUTPATIENT)
Dept: ENDOCRINOLOGY | Age: 79
End: 2019-08-02

## 2019-08-08 ENCOUNTER — OFFICE VISIT (OUTPATIENT)
Dept: ENDOCRINOLOGY | Age: 79
End: 2019-08-08
Payer: MEDICARE

## 2019-08-08 VITALS
HEART RATE: 76 BPM | DIASTOLIC BLOOD PRESSURE: 68 MMHG | SYSTOLIC BLOOD PRESSURE: 108 MMHG | WEIGHT: 191.6 LBS | BODY MASS INDEX: 33.95 KG/M2 | OXYGEN SATURATION: 96 % | HEIGHT: 63 IN

## 2019-08-08 DIAGNOSIS — E03.8 HYPOTHYROIDISM DUE TO HASHIMOTO'S THYROIDITIS: Primary | ICD-10-CM

## 2019-08-08 DIAGNOSIS — I10 ESSENTIAL HYPERTENSION: ICD-10-CM

## 2019-08-08 DIAGNOSIS — E06.3 HYPOTHYROIDISM DUE TO HASHIMOTO'S THYROIDITIS: Primary | ICD-10-CM

## 2019-08-08 DIAGNOSIS — K21.00 GASTROESOPHAGEAL REFLUX DISEASE WITH ESOPHAGITIS: ICD-10-CM

## 2019-08-08 PROCEDURE — 1036F TOBACCO NON-USER: CPT | Performed by: INTERNAL MEDICINE

## 2019-08-08 PROCEDURE — 1090F PRES/ABSN URINE INCON ASSESS: CPT | Performed by: INTERNAL MEDICINE

## 2019-08-08 PROCEDURE — 1123F ACP DISCUSS/DSCN MKR DOCD: CPT | Performed by: INTERNAL MEDICINE

## 2019-08-08 PROCEDURE — G8427 DOCREV CUR MEDS BY ELIG CLIN: HCPCS | Performed by: INTERNAL MEDICINE

## 2019-08-08 PROCEDURE — 4040F PNEUMOC VAC/ADMIN/RCVD: CPT | Performed by: INTERNAL MEDICINE

## 2019-08-08 PROCEDURE — 99213 OFFICE O/P EST LOW 20 MIN: CPT | Performed by: INTERNAL MEDICINE

## 2019-08-08 PROCEDURE — G8399 PT W/DXA RESULTS DOCUMENT: HCPCS | Performed by: INTERNAL MEDICINE

## 2019-08-08 PROCEDURE — G8417 CALC BMI ABV UP PARAM F/U: HCPCS | Performed by: INTERNAL MEDICINE

## 2019-08-08 RX ORDER — LEVOTHYROXINE AND LIOTHYRONINE 57; 13.5 UG/1; UG/1
45 TABLET ORAL
Qty: 45 TABLET | Refills: 3 | Status: CANCELLED | OUTPATIENT
Start: 2019-08-08

## 2019-08-08 RX ORDER — TORSEMIDE 20 MG/1
20 TABLET ORAL PRN
COMMUNITY
End: 2022-03-08

## 2019-08-08 RX ORDER — THYROID 60 MG
60 TABLET ORAL
Qty: 30 TABLET | Refills: 1 | Status: SHIPPED | OUTPATIENT
Start: 2019-08-08 | End: 2019-09-19

## 2019-08-08 RX ORDER — METOPROLOL SUCCINATE 25 MG/1
12.5 TABLET, EXTENDED RELEASE ORAL 2 TIMES DAILY
COMMUNITY
End: 2019-12-21

## 2019-09-16 LAB
T3 TOTAL: 95
TSH SERPL DL<=0.05 MIU/L-ACNC: 5.71 UIU/ML

## 2019-09-19 ENCOUNTER — OFFICE VISIT (OUTPATIENT)
Dept: ENDOCRINOLOGY | Age: 79
End: 2019-09-19
Payer: MEDICARE

## 2019-09-19 VITALS
SYSTOLIC BLOOD PRESSURE: 108 MMHG | BODY MASS INDEX: 32.78 KG/M2 | HEART RATE: 75 BPM | WEIGHT: 185 LBS | OXYGEN SATURATION: 98 % | DIASTOLIC BLOOD PRESSURE: 80 MMHG | HEIGHT: 63 IN

## 2019-09-19 DIAGNOSIS — Z86.718 HISTORY OF RECURRENT DEEP VEIN THROMBOSIS (DVT): ICD-10-CM

## 2019-09-19 DIAGNOSIS — R73.03 PREDIABETES: ICD-10-CM

## 2019-09-19 DIAGNOSIS — K21.00 GASTROESOPHAGEAL REFLUX DISEASE WITH ESOPHAGITIS: ICD-10-CM

## 2019-09-19 DIAGNOSIS — E03.9 ACQUIRED HYPOTHYROIDISM: Primary | ICD-10-CM

## 2019-09-19 DIAGNOSIS — I10 ESSENTIAL HYPERTENSION: ICD-10-CM

## 2019-09-19 PROCEDURE — 1036F TOBACCO NON-USER: CPT | Performed by: INTERNAL MEDICINE

## 2019-09-19 PROCEDURE — G8399 PT W/DXA RESULTS DOCUMENT: HCPCS | Performed by: INTERNAL MEDICINE

## 2019-09-19 PROCEDURE — 99214 OFFICE O/P EST MOD 30 MIN: CPT | Performed by: INTERNAL MEDICINE

## 2019-09-19 PROCEDURE — 1123F ACP DISCUSS/DSCN MKR DOCD: CPT | Performed by: INTERNAL MEDICINE

## 2019-09-19 PROCEDURE — 1090F PRES/ABSN URINE INCON ASSESS: CPT | Performed by: INTERNAL MEDICINE

## 2019-09-19 PROCEDURE — G8417 CALC BMI ABV UP PARAM F/U: HCPCS | Performed by: INTERNAL MEDICINE

## 2019-09-19 PROCEDURE — G8427 DOCREV CUR MEDS BY ELIG CLIN: HCPCS | Performed by: INTERNAL MEDICINE

## 2019-09-19 PROCEDURE — 4040F PNEUMOC VAC/ADMIN/RCVD: CPT | Performed by: INTERNAL MEDICINE

## 2019-09-19 RX ORDER — LEVOTHYROXINE AND LIOTHYRONINE 38; 9 UG/1; UG/1
60 TABLET ORAL DAILY
COMMUNITY
End: 2020-04-22
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
## 2019-11-22 ENCOUNTER — TELEPHONE (OUTPATIENT)
Dept: ENDOCRINOLOGY | Age: 79
End: 2019-11-22

## 2019-11-22 DIAGNOSIS — E03.9 ACQUIRED HYPOTHYROIDISM: Primary | ICD-10-CM

## 2019-11-27 RX ORDER — THYROID 60 MG
60 TABLET ORAL DAILY
Qty: 30 TABLET | Refills: 3 | Status: SHIPPED | OUTPATIENT
Start: 2019-11-27 | End: 2020-04-22 | Stop reason: SDUPTHER

## 2019-12-11 ENCOUNTER — TELEPHONE (OUTPATIENT)
Dept: ENDOCRINOLOGY | Age: 79
End: 2019-12-11

## 2019-12-21 ENCOUNTER — APPOINTMENT (OUTPATIENT)
Dept: GENERAL RADIOLOGY | Age: 79
End: 2019-12-21
Payer: MEDICARE

## 2019-12-21 ENCOUNTER — HOSPITAL ENCOUNTER (EMERGENCY)
Age: 79
Discharge: HOME | End: 2019-12-21
Payer: MEDICARE

## 2019-12-21 VITALS
HEIGHT: 62 IN | TEMPERATURE: 97.3 F | RESPIRATION RATE: 16 BRPM | DIASTOLIC BLOOD PRESSURE: 87 MMHG | WEIGHT: 174 LBS | OXYGEN SATURATION: 98 % | SYSTOLIC BLOOD PRESSURE: 155 MMHG | HEART RATE: 78 BPM | BODY MASS INDEX: 32.02 KG/M2

## 2019-12-21 DIAGNOSIS — S80.02XA TRAUMATIC ECCHYMOSIS OF LEFT KNEE, INITIAL ENCOUNTER: Primary | ICD-10-CM

## 2019-12-21 PROCEDURE — 99283 EMERGENCY DEPT VISIT LOW MDM: CPT

## 2019-12-21 PROCEDURE — 73562 X-RAY EXAM OF KNEE 3: CPT

## 2019-12-21 RX ORDER — TRAMADOL HYDROCHLORIDE 50 MG/1
50 TABLET ORAL EVERY 6 HOURS PRN
Qty: 20 TABLET | Refills: 0 | Status: SHIPPED | OUTPATIENT
Start: 2019-12-21 | End: 2019-12-24

## 2019-12-21 ASSESSMENT — ENCOUNTER SYMPTOMS
DIARRHEA: 0
SHORTNESS OF BREATH: 0
NAUSEA: 0
ABDOMINAL PAIN: 0
CHEST TIGHTNESS: 0
VOMITING: 0

## 2019-12-21 ASSESSMENT — PAIN SCALES - GENERAL: PAINLEVEL_OUTOF10: 1

## 2020-01-06 ENCOUNTER — OFFICE VISIT (OUTPATIENT)
Dept: ORTHOPEDIC SURGERY | Age: 80
End: 2020-01-06
Payer: MEDICARE

## 2020-01-06 VITALS
SYSTOLIC BLOOD PRESSURE: 127 MMHG | HEIGHT: 62 IN | DIASTOLIC BLOOD PRESSURE: 72 MMHG | BODY MASS INDEX: 32.2 KG/M2 | WEIGHT: 175 LBS | HEART RATE: 81 BPM

## 2020-01-06 PROCEDURE — G8399 PT W/DXA RESULTS DOCUMENT: HCPCS | Performed by: ORTHOPAEDIC SURGERY

## 2020-01-06 PROCEDURE — G8417 CALC BMI ABV UP PARAM F/U: HCPCS | Performed by: ORTHOPAEDIC SURGERY

## 2020-01-06 PROCEDURE — 1036F TOBACCO NON-USER: CPT | Performed by: ORTHOPAEDIC SURGERY

## 2020-01-06 PROCEDURE — G8484 FLU IMMUNIZE NO ADMIN: HCPCS | Performed by: ORTHOPAEDIC SURGERY

## 2020-01-06 PROCEDURE — G8427 DOCREV CUR MEDS BY ELIG CLIN: HCPCS | Performed by: ORTHOPAEDIC SURGERY

## 2020-01-06 PROCEDURE — 4040F PNEUMOC VAC/ADMIN/RCVD: CPT | Performed by: ORTHOPAEDIC SURGERY

## 2020-01-06 PROCEDURE — 99202 OFFICE O/P NEW SF 15 MIN: CPT | Performed by: ORTHOPAEDIC SURGERY

## 2020-01-06 PROCEDURE — 1090F PRES/ABSN URINE INCON ASSESS: CPT | Performed by: ORTHOPAEDIC SURGERY

## 2020-01-06 PROCEDURE — 1123F ACP DISCUSS/DSCN MKR DOCD: CPT | Performed by: ORTHOPAEDIC SURGERY

## 2020-01-06 NOTE — PROGRESS NOTES
mouth as needed       No current facility-administered medications for this visit. allergies, social and family histories were reviewed and updated as appropriate. Review of Systems:  Relevant review of systems reviewed and available in the patient's chart and scanned in under the MEDIA tab today. Vital Signs:  /72   Pulse 81   Ht 5' 2\" (1.575 m)   Wt 175 lb (79.4 kg)   BMI 32.01 kg/m²     General Exam:   Constitutional: She is adequately groomed with no evidence of malnutrition, obesity present  Mental Status: She is oriented to time, place and person. Normal mentation and affect for age. Lymphatic: The lymphatic examination bilaterally reveals all areas to be without enlargement or induration. Vascular: Examination reveals no swelling or calf tenderness. Peripheral pulses are palpable and 2+. Neurological: She has good coordination and balance. There is no focal weakness or sensory deficit. Left Knee Examination:    Inspection:  Knee shows neutral alignment  Normal muscle bulk and tone for her age and activity level. No erythema or significant effusion. She does have resolving ecchymosis over the anterior aspect of the knee with slight amount of swelling. Palpation: Tender over the pretibial area at the joint line and over the prepatellar bursa. No significant bogginess. No crepitation. No significant tenderness along the joint line medial or lateral particularly at the femoral condyles. Extensor mechanism palpates intact. Range of Motion: 0 to 115 with mild anterior knee pain on flexion    Strength:  Quad 4/ 5  ; Hamstrings 4/ 5. Gross motor to hip and ankle intact, no pain with logroll the hip. Stinchfield examination negative. Special Tests:      negative anterior drawer    no posterior sag    no posterior drawer   Negative patellar grind.     does not open to valgus or varus stress at 0 or 30°    negative Ke's    negative Homans    Posterior tibial

## 2020-04-17 ENCOUNTER — HOSPITAL ENCOUNTER (OUTPATIENT)
Age: 80
Discharge: HOME | End: 2020-04-17
Payer: MEDICARE

## 2020-04-17 LAB
T3 TOTAL: 1.25 NG/ML (ref 0.8–2)
TSH SERPL DL<=0.05 MIU/L-ACNC: 5.01 UIU/ML (ref 0.27–4.2)

## 2020-04-17 PROCEDURE — 84480 ASSAY TRIIODOTHYRONINE (T3): CPT

## 2020-04-17 PROCEDURE — 84443 ASSAY THYROID STIM HORMONE: CPT

## 2020-04-17 PROCEDURE — 36415 COLL VENOUS BLD VENIPUNCTURE: CPT

## 2020-04-22 ENCOUNTER — VIRTUAL VISIT (OUTPATIENT)
Dept: ENDOCRINOLOGY | Age: 80
End: 2020-04-22
Payer: MEDICARE

## 2020-04-22 PROCEDURE — 4040F PNEUMOC VAC/ADMIN/RCVD: CPT | Performed by: INTERNAL MEDICINE

## 2020-04-22 PROCEDURE — 1090F PRES/ABSN URINE INCON ASSESS: CPT | Performed by: INTERNAL MEDICINE

## 2020-04-22 PROCEDURE — 1123F ACP DISCUSS/DSCN MKR DOCD: CPT | Performed by: INTERNAL MEDICINE

## 2020-04-22 PROCEDURE — G8427 DOCREV CUR MEDS BY ELIG CLIN: HCPCS | Performed by: INTERNAL MEDICINE

## 2020-04-22 PROCEDURE — 99213 OFFICE O/P EST LOW 20 MIN: CPT | Performed by: INTERNAL MEDICINE

## 2020-04-22 PROCEDURE — G8399 PT W/DXA RESULTS DOCUMENT: HCPCS | Performed by: INTERNAL MEDICINE

## 2020-04-22 RX ORDER — THYROID 60 MG
60 TABLET ORAL DAILY
Qty: 90 TABLET | Refills: 1 | Status: SHIPPED | OUTPATIENT
Start: 2020-04-22 | End: 2020-11-20 | Stop reason: SDUPTHER

## 2020-04-22 RX ORDER — METOPROLOL SUCCINATE 50 MG/1
TABLET, EXTENDED RELEASE ORAL
COMMUNITY
Start: 2020-02-10 | End: 2020-08-18

## 2020-04-22 RX ORDER — GABAPENTIN 100 MG/1
100 CAPSULE ORAL NIGHTLY
COMMUNITY
End: 2021-01-26 | Stop reason: SDUPTHER

## 2020-04-22 NOTE — PROGRESS NOTES
TELEHEALTH EVALUATION -- Audio/Visual (During RMPWI-27 public health emergency)  Pursuant to the emergency declaration under the [facility], [facility] authority and the [employer] Act, this Virtual  Visit was conducted, with patient's consent, to reduce the patient's risk of exposure to COVID-19 and provide care for  patient. Services were provided through a video synchronous discussion virtually to substitute for in-person clinic visit. Patient's location : home     Patient provided verbal consent to use the video visit. I spent  Time on  this call conducting an interview, performing a limited exam by video and educating the patient on my assessment plan. Total time spent :1301 Shek[address] LIN is a 79 y.o. female seen for for hypothyroidism. Patient was diagnosed with Hypothyroidism approximately May 1998 at the time of diagnosis she was having ringing in her ears and her workup showed a goiter which led to her diagnosis   Current complaints: denies fatigue, weight changes, heat/cold intolerance, bowel/skin changes or CVS symptoms. She recalls her thyroid fx test were slightly abnormal   History of obstructive symptoms: difficulty swallowing No, changes in voice/hoarseness No.    History of radiation to patient's neck: NO  Recent iodine exposure: No  Family history includes no thyroid abnormalities.   Family history of thyroid cancer: No    She has Esophageal stricture and Hiatal Hernia and also has GERD and is on Zantac   She takes 100 mg of gabapentin at night for hip pain   She has been diagnosed with Osteopenia and takes calcium and Vitamin d   Her last DEXA was in 11/2017 with DR Bishop Little and she is noted to have osteopenia   She had renal cell carcinoma and is s/p left nephrectomy she follows with Urologist   She has hx of DVT and is on Xarelto     Patient started taking 90 mg of [place] Thyroid on May 23, 2009     Past Surgical History:   Procedure Laterality Date    APPENDECTOMY      BREAST BIOPSY       SECTION      x3    ENDOSCOPY, COLON, DIAGNOSTIC      KIDNEY SURGERY Right 2016    cryoablation procedure    RIGHT COLECTOMY Right 2012    lap, reurrent sessile adenoma    TOTAL NEPHRECTOMY Left     CA     Family History   Problem Relation Age of Onset    Heart Disease Father     Heart Disease Mother     No Known Problems Sister     No Known Problems Sister     High Blood Pressure Neg Hx     High Cholesterol Neg Hx      Social History     Socioeconomic History    Marital status:      Spouse name: None    Number of children: None    Years of education: None    Highest education level: None   Occupational History    Occupation: retired [employer], 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[employer] , consulting   Social Needs    Financial resource strain: None    Food insecurity     Worry: None     Inability: None    Transportation needs     Medical: None     Non-medical: None   Tobacco Use    Smoking status: Former Smoker     Packs/day: 2.00     Years: 19.00     Pack years: 38.00     Last attempt to quit: 10/29/1982     Years since quittin.5    Smokeless tobacco: Never Used   Substance and Sexual Activity    Alcohol use: Not Currently     Comment: MINIMAL    Drug use: No    Sexual activity: None   Lifestyle    Physical activity     Days per week: None     Minutes per session: None    Stress: None   Relationships    Social connections     Talks on phone: None     Gets together: None     Attends Anabaptist service: None     Active member of club or organization: None     Attends meetings of clubs or organizations: None     Relationship status: None    Intimate partner violence     Fear of current or ex partner: None     Emotionally abused: None     Physically abused: None     Forced sexual activity: None   Other Topics Concern    None   Social History Narrative    None Current Outpatient Medications   Medication Sig Dispense Refill    gabapentin (NEURONTIN) 100 MG capsule Take 100 mg by mouth nightly.  metoprolol succinate (TOPROL XL) 50 MG extended release tablet metoprolol succinate ER 50 mg tablet,extended release 24 hr      ARMOUR THYROID 60 MG tablet Take 1 tablet by mouth daily 90 tablet 1    Famotidine-Ca Carb-Mag Hydrox (PEPCID COMPLETE PO) Take by mouth      torsemide (DEMADEX) 20 MG tablet Take 20 mg by mouth as needed       POTASSIUM PO Take 40 mg by mouth as needed       Cyanocobalamin (VITAMIN B-12 PO) Take by mouth      OXYBUTYNIN CHLORIDE PO Take 5 mg by mouth as needed      MAGNESIUM PO Take 250 mg by mouth daily       clotrimazole-betamethasone (LOTRISONE) 1-0.05 % cream clotrimazole-betamethasone 1 %-0.05 % topical cream   APPLY TO THE AFFECTED AND SURROUNDING AREAS OF SKIN BY TOPICAL ROUTE 2 TIMES PER DAY IN THE MORNING AND EVENING FOR 2 WEEKS      hydrocortisone (PROCTOSOL HC) 2.5 % rectal cream Proctosol HC 2.5 % topical cream perineal applicator      Cholecalciferol (VITAMIN D3) 1000 units CAPS 1000 units daily      rivaroxaban (XARELTO) 10 MG TABS tablet Take 15 mg by mouth daily (with breakfast)       loratadine (CLARITIN) 10 MG tablet Take 10 mg by mouth as needed      fluticasone (FLONASE) 50 MCG/ACT nasal spray 1 spray by Nasal route as needed       gabapentin (NEURONTIN) 100 MG capsule 200 in am and 300 at night. . (Patient taking differently: Take 100 mg by mouth nightly. ) 150 capsule 5     No current facility-administered medications for this visit.       Allergies   Allergen Reactions    Bupropion Other (See Comments)     Patient stated medication makes her symptoms worse    Fosamax [Alendronate]     Wellbutrin [Bupropion Hcl]          Review of Systems:  Constitutional  Patient denies any weight loss denies any weight gain,  Eyes   Pt denies any double vision blurred vision or decreased peripheral vision  Head ear nose

## 2020-04-23 ENCOUNTER — TELEPHONE (OUTPATIENT)
Dept: ENDOCRINOLOGY | Age: 80
End: 2020-04-23

## 2020-05-18 ENCOUNTER — HOSPITAL ENCOUNTER (OUTPATIENT)
Dept: WOMENS IMAGING | Age: 80
Discharge: HOME | End: 2020-05-18
Payer: MEDICARE

## 2020-05-18 ENCOUNTER — HOSPITAL ENCOUNTER (OUTPATIENT)
Dept: GENERAL RADIOLOGY | Age: 80
Discharge: HOME | End: 2020-05-18
Payer: MEDICARE

## 2020-05-18 PROCEDURE — 77080 DXA BONE DENSITY AXIAL: CPT

## 2020-05-18 PROCEDURE — 77063 BREAST TOMOSYNTHESIS BI: CPT

## 2020-06-05 ENCOUNTER — TELEPHONE (OUTPATIENT)
Dept: ENDOCRINOLOGY | Age: 80
End: 2020-06-05

## 2020-07-06 ENCOUNTER — TELEPHONE (OUTPATIENT)
Dept: ENDOCRINOLOGY | Age: 80
End: 2020-07-06

## 2020-07-06 NOTE — TELEPHONE ENCOUNTER
Pt calling & states she has no energy and wants to know if she should get labwork done to see if it's her thyroid?   Pt does not have FU until October  # [phone]

## 2020-07-08 ENCOUNTER — HOSPITAL ENCOUNTER (OUTPATIENT)
Age: 80
Discharge: HOME | End: 2020-07-08
Payer: MEDICARE

## 2020-07-08 LAB
T3 TOTAL: 1.36 NG/ML (ref 0.8–2)
TSH SERPL DL<=0.05 MIU/L-ACNC: 4.34 UIU/ML (ref 0.27–4.2)

## 2020-07-08 PROCEDURE — 36415 COLL VENOUS BLD VENIPUNCTURE: CPT

## 2020-07-08 PROCEDURE — 84480 ASSAY TRIIODOTHYRONINE (T3): CPT

## 2020-07-08 PROCEDURE — 84443 ASSAY THYROID STIM HORMONE: CPT

## 2020-07-16 DIAGNOSIS — M81.0 SENILE OSTEOPOROSIS: ICD-10-CM

## 2020-08-07 ENCOUNTER — VIRTUAL VISIT (OUTPATIENT)
Dept: ENDOCRINOLOGY | Age: 80
End: 2020-08-07
Payer: MEDICARE

## 2020-08-07 ENCOUNTER — TELEPHONE (OUTPATIENT)
Dept: ENDOCRINOLOGY | Age: 80
End: 2020-08-07

## 2020-08-07 ENCOUNTER — PATIENT MESSAGE (OUTPATIENT)
Dept: ENDOCRINOLOGY | Age: 80
End: 2020-08-07

## 2020-08-07 PROCEDURE — 99442 PR PHYS/QHP TELEPHONE EVALUATION 11-20 MIN: CPT | Performed by: INTERNAL MEDICINE

## 2020-08-07 NOTE — TELEPHONE ENCOUNTER
[employer] message sent from patient:    Dr. Kimberlee Corley:     I believe I'm having an issue with my thyroid.  I've been having episodes of very low blood pressure.  On Tuesday, it was so low and I felt so terrible that I called my cardiologist and they suggested I go to the ER.  After three hours at [facility], and an EKG and blood work, they found there was no issue with my heart.  I feel that leaves my thyroid or my parathyroid gland.       Please, I need resolution for my medical issue. Bean Jolly are times when I feel so bad that I don't feel like doing anything for hours and then suddenly, I feel fine.  Tuesday was one of the worst episodes though and it lasted a couple of hours.  By the time I got to the ER, my BP was normal.     Can you help me?      Vinny Shipley   [phone]

## 2020-08-07 NOTE — TELEPHONE ENCOUNTER
From: Karlo Wing  To: Dayanna Escobar MD  Sent: 8/7/2020 9:39 AM EDT  Subject: Test Results Question    Dr. Bethanie Lopez:  I believe I'm having an issue with my thyroid. I've been having episodes of very low blood pressure. On Tuesday, it was so low and I felt so terrible that I called my cardiologist and they suggested I go to the ER. After three hours at [facility], and an EKG and blood work, they found there was no issue with my heart. I feel that leaves my thyroid or my parathyroid gland. Please, I need resolution for my medical issue. There are times when I feel so bad that I don't feel like doing anything for hours and then suddenly, I feel fine. Tuesday was one of the worst episodes though and it lasted a couple of hours. By the time I got to the ER, my BP was normal.    Can you help me?     Juan Luis Cortes  [phone]
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
## 2020-08-18 ENCOUNTER — OFFICE VISIT (OUTPATIENT)
Dept: FAMILY MEDICINE CLINIC | Age: 80
End: 2020-08-18
Payer: MEDICARE

## 2020-08-18 VITALS
DIASTOLIC BLOOD PRESSURE: 86 MMHG | SYSTOLIC BLOOD PRESSURE: 124 MMHG | HEART RATE: 76 BPM | OXYGEN SATURATION: 98 % | WEIGHT: 183.4 LBS | BODY MASS INDEX: 33.54 KG/M2

## 2020-08-18 PROBLEM — I34.0 NONRHEUMATIC MITRAL VALVE REGURGITATION: Status: ACTIVE | Noted: 2020-08-18

## 2020-08-18 PROBLEM — I42.9 IDIOPATHIC CARDIOMYOPATHY (HCC): Status: ACTIVE | Noted: 2019-07-25

## 2020-08-18 PROBLEM — I47.29 NSVT (NONSUSTAINED VENTRICULAR TACHYCARDIA) (HCC): Status: ACTIVE | Noted: 2020-08-18

## 2020-08-18 PROBLEM — I50.42 CHRONIC COMBINED SYSTOLIC AND DIASTOLIC HEART FAILURE (HCC): Status: ACTIVE | Noted: 2019-07-25

## 2020-08-18 PROBLEM — R60.0 BILATERAL LEG EDEMA: Status: ACTIVE | Noted: 2019-07-25

## 2020-08-18 PROCEDURE — 1036F TOBACCO NON-USER: CPT | Performed by: NURSE PRACTITIONER

## 2020-08-18 PROCEDURE — 1123F ACP DISCUSS/DSCN MKR DOCD: CPT | Performed by: NURSE PRACTITIONER

## 2020-08-18 PROCEDURE — 4040F PNEUMOC VAC/ADMIN/RCVD: CPT | Performed by: NURSE PRACTITIONER

## 2020-08-18 PROCEDURE — G8427 DOCREV CUR MEDS BY ELIG CLIN: HCPCS | Performed by: NURSE PRACTITIONER

## 2020-08-18 PROCEDURE — 1090F PRES/ABSN URINE INCON ASSESS: CPT | Performed by: NURSE PRACTITIONER

## 2020-08-18 PROCEDURE — 36415 COLL VENOUS BLD VENIPUNCTURE: CPT | Performed by: NURSE PRACTITIONER

## 2020-08-18 PROCEDURE — G8399 PT W/DXA RESULTS DOCUMENT: HCPCS | Performed by: NURSE PRACTITIONER

## 2020-08-18 PROCEDURE — G8417 CALC BMI ABV UP PARAM F/U: HCPCS | Performed by: NURSE PRACTITIONER

## 2020-08-18 PROCEDURE — 99215 OFFICE O/P EST HI 40 MIN: CPT | Performed by: NURSE PRACTITIONER

## 2020-08-18 RX ORDER — METOPROLOL SUCCINATE 50 MG/1
50 TABLET, EXTENDED RELEASE ORAL EVERY EVENING
COMMUNITY
Start: 2020-08-18

## 2020-08-18 ASSESSMENT — ENCOUNTER SYMPTOMS
ABDOMINAL PAIN: 0
SHORTNESS OF BREATH: 0
BACK PAIN: 1
NAUSEA: 0
VOMITING: 0
DIARRHEA: 0

## 2020-08-18 ASSESSMENT — PATIENT HEALTH QUESTIONNAIRE - PHQ9
SUM OF ALL RESPONSES TO PHQ9 QUESTIONS 1 & 2: 0
SUM OF ALL RESPONSES TO PHQ QUESTIONS 1-9: 0
SUM OF ALL RESPONSES TO PHQ QUESTIONS 1-9: 0
1. LITTLE INTEREST OR PLEASURE IN DOING THINGS: 0
2. FEELING DOWN, DEPRESSED OR HOPELESS: 0

## 2020-08-18 NOTE — PATIENT INSTRUCTIONS
you need. You can get vitamin D from eggs, fatty fish, cereal, and milk. ? Ask your doctor if you need to take a calcium plus vitamin D supplement. You may be able to get enough calcium and vitamin D through your diet. Be careful with supplements. Adults ages 23 to 48 should not get more than 2,500 mg of calcium and 4,000 IU of vitamin D each day, whether it is from supplements and/or food. Adults ages 46 and older should not get more than 2,000 mg of calcium and 4,000 IU of vitamin D each day from supplements and/or food. · Limit alcohol to 2 drinks a day for men and 1 drink a day for women. Too much alcohol can cause health problems. · Do not smoke. Smoking puts you at a much higher risk for osteoporosis. If you need help quitting, talk to your doctor about stop-smoking programs and medicines. These can increase your chances of quitting for good. · Get regular bone-building exercise. Weight-bearing and resistance exercises keep bones healthy by working the muscles and bones against gravity. Start out at an exercise level that feels right for you. Add a little at a time until you can do the following:  ? Do 30 minutes of weight-bearing exercise on most days of the week. Walking, jogging, stair climbing, and dancing are good choices. ? Do resistance exercises with weights or elastic bands 2 to 3 days a week. · Reduce your risk of falls:  ? Wear supportive shoes with low heels and nonslip soles. ? Use a cane or walker, if you need it. Use shower chairs and bath benches. Put in handrails on stairways, around your shower or tub area, and near the toilet. ? Keep stairs, porches, and walkways well lit. Use night-lights. ? Remove throw rugs and other objects that are in the way. ? Avoid icy, wet, or slippery surfaces. ? Keep a cordless phone and a flashlight with new batteries by your bed. When should you call for help?   Watch closely for changes in your health, and be sure to contact your doctor if you have any problems. Where can you learn more? Go to [email or web address] and sign in to your [employer] account. Enter K100 in the [employer] box to learn more about \"Osteoporosis: Care Instructions. \"     If you do not have an account, please click on the \"Sign Up Now\" link. Current as of: August 7, 2019               Content Version: 12.5  © 3977-8714 Healthwise, Incorporated. Care instructions adapted under license by [facility] ([facility]). If you have questions about a medical condition or this instruction, always ask your healthcare professional. [address] any warranty or liability for your use of this information.

## 2020-08-19 LAB
A/G RATIO: 1.2 (ref 1.1–2.2)
ALBUMIN SERPL-MCNC: 3.8 G/DL (ref 3.4–5)
ALP BLD-CCNC: 115 U/L (ref 40–129)
ALT SERPL-CCNC: 16 U/L (ref 10–40)
ANION GAP SERPL CALCULATED.3IONS-SCNC: 12 MMOL/L (ref 3–16)
AST SERPL-CCNC: 19 U/L (ref 15–37)
BILIRUB SERPL-MCNC: 0.3 MG/DL (ref 0–1)
BUN BLDV-MCNC: 28 MG/DL (ref 7–20)
CALCIUM SERPL-MCNC: 9.8 MG/DL (ref 8.3–10.6)
CHLORIDE BLD-SCNC: 103 MMOL/L (ref 99–110)
CO2: 25 MMOL/L (ref 21–32)
CREAT SERPL-MCNC: 0.9 MG/DL (ref 0.6–1.2)
GFR AFRICAN AMERICAN: >60
GFR NON-AFRICAN AMERICAN: >60
GLOBULIN: 3.2 G/DL
GLUCOSE BLD-MCNC: 94 MG/DL (ref 70–99)
POTASSIUM SERPL-SCNC: 5.5 MMOL/L (ref 3.5–5.1)
SODIUM BLD-SCNC: 140 MMOL/L (ref 136–145)
TOTAL PROTEIN: 7 G/DL (ref 6.4–8.2)

## 2020-08-27 ENCOUNTER — HOSPITAL ENCOUNTER (OUTPATIENT)
Dept: ONCOLOGY | Age: 80
Setting detail: INFUSION SERIES
Discharge: HOME | End: 2020-08-27
Payer: MEDICARE

## 2020-08-27 VITALS
HEART RATE: 75 BPM | DIASTOLIC BLOOD PRESSURE: 63 MMHG | TEMPERATURE: 98.2 F | SYSTOLIC BLOOD PRESSURE: 114 MMHG | RESPIRATION RATE: 16 BRPM

## 2020-08-27 DIAGNOSIS — M81.0 SENILE OSTEOPOROSIS: Primary | ICD-10-CM

## 2020-08-27 PROCEDURE — 99201 HC NEW PT, OUTPT VISIT LEVEL 1: CPT

## 2020-08-27 PROCEDURE — 6360000002 HC RX W HCPCS: Performed by: INTERNAL MEDICINE

## 2020-08-27 PROCEDURE — 96372 THER/PROPH/DIAG INJ SC/IM: CPT

## 2020-08-27 RX ADMIN — DENOSUMAB 60 MG: 60 INJECTION SUBCUTANEOUS at 10:39

## 2020-08-27 NOTE — PROGRESS NOTES
Patient to department for initial Prolia injection. AVS printed and reviewed. . Tolerated injection well. Monitored 30 minutes post injection. To return in 6 months.

## 2020-09-04 ENCOUNTER — TELEPHONE (OUTPATIENT)
Dept: ENDOCRINOLOGY | Age: 80
End: 2020-09-04

## 2020-09-04 ENCOUNTER — TELEPHONE (OUTPATIENT)
Dept: FAMILY MEDICINE CLINIC | Age: 80
End: 2020-09-04

## 2020-09-04 NOTE — TELEPHONE ENCOUNTER
Please advise patient to go back to the previous dose of 60 mg daily and see if the symptoms improve.   Just make sure that she takes her thyroid medicine every day at the same time but no iron calcium or multivitamins and we can repeat the labs in 6 to 8 weeks hopefully her symptoms will improve

## 2020-09-04 NOTE — TELEPHONE ENCOUNTER
Pt is going to see trino on Thursday she said she spoke to her endocrinologist who told her to decrease her thyroid medication.  She will call back on Tuesday if appt still needed and let us know how she feels

## 2020-09-04 NOTE — TELEPHONE ENCOUNTER
Patient called in and stated she has had episodes of flushing and feeling hot after eating since she has increased dose. She has also been experiencing heart palpitations and fatigue. Patient stated overall does not feel well.

## 2020-09-10 ENCOUNTER — OFFICE VISIT (OUTPATIENT)
Dept: FAMILY MEDICINE CLINIC | Age: 80
End: 2020-09-10
Payer: MEDICARE

## 2020-09-10 VITALS
BODY MASS INDEX: 33.32 KG/M2 | SYSTOLIC BLOOD PRESSURE: 112 MMHG | WEIGHT: 182.2 LBS | HEART RATE: 88 BPM | DIASTOLIC BLOOD PRESSURE: 66 MMHG | OXYGEN SATURATION: 98 %

## 2020-09-10 PROCEDURE — G8427 DOCREV CUR MEDS BY ELIG CLIN: HCPCS | Performed by: NURSE PRACTITIONER

## 2020-09-10 PROCEDURE — 1090F PRES/ABSN URINE INCON ASSESS: CPT | Performed by: NURSE PRACTITIONER

## 2020-09-10 PROCEDURE — G8417 CALC BMI ABV UP PARAM F/U: HCPCS | Performed by: NURSE PRACTITIONER

## 2020-09-10 PROCEDURE — 99214 OFFICE O/P EST MOD 30 MIN: CPT | Performed by: NURSE PRACTITIONER

## 2020-09-10 PROCEDURE — G8399 PT W/DXA RESULTS DOCUMENT: HCPCS | Performed by: NURSE PRACTITIONER

## 2020-09-10 PROCEDURE — 1036F TOBACCO NON-USER: CPT | Performed by: NURSE PRACTITIONER

## 2020-09-10 PROCEDURE — 1123F ACP DISCUSS/DSCN MKR DOCD: CPT | Performed by: NURSE PRACTITIONER

## 2020-09-10 PROCEDURE — 4040F PNEUMOC VAC/ADMIN/RCVD: CPT | Performed by: NURSE PRACTITIONER

## 2020-09-10 ASSESSMENT — ENCOUNTER SYMPTOMS
COUGH: 0
CONSTIPATION: 0
SHORTNESS OF BREATH: 1
COLOR CHANGE: 0

## 2020-09-10 NOTE — PROGRESS NOTES
SUBJECTIVE:  Pt is a of 79 y.o. female comes in today with   Chief Complaint   Patient presents with    Thyroid Problem     Pt wants to talk about her thyroid       Patient presenting today for evaluation of thyroid. TSH elevated for past 18 months or so, managed by Dr Patrick Holloway. eval 8/7/20, Dresser was increased to 90 mgs 2 days/week and kept at 60 mg 5 days/week. On days taking 90 noticing sudden onset of feeling flushed, would last for several hours. Has decreased back to 60 mg 6 days/week and flushing has resolved. Fatigue is still present. Patient is  taking her medication consistently on an empty stomach. She is also c/o lightheadedness: started in July. Reports 3 episodes, lasted approx 30 sec. Sensation of off balance. Was sitting with each episode. After lightheadedness resolved, significant fatigue and drained for several hours. Slight SOB. No pallor, diaphoresis or nausea. eval with Dr Brent Cates, cardiologist, recommending chemical stress test- has yet to schedule. Denies CP, HA or pedal edema    Prior to Visit Medications    Medication Sig Taking? Authorizing Provider   metoprolol succinate (TOPROL XL) 50 MG extended release tablet Take 0.5 tablets by mouth every evening Yes Maura Mcintosh, APRN - CNP   gabapentin (NEURONTIN) 100 MG capsule Take 100 mg by mouth nightly.   Yes Historical Provider, MD [place] THYROID 60 MG tablet Take 1 tablet by mouth daily Yes Wiley Bassett MD   Famotidine-Ca Carb-Mag Hydrox (PEPCID COMPLETE PO) Take by mouth Yes Historical Provider, MD   torsemide (DEMADEX) 20 MG tablet Take 20 mg by mouth as needed  Yes Historical Provider, MD   POTASSIUM PO Take 40 mg by mouth as needed  Yes Historical Provider, MD   Cyanocobalamin (VITAMIN B-12 PO) Take by mouth Yes Historical Provider, MD   MAGNESIUM PO Take 250 mg by mouth daily  Yes Historical Provider, MD   clotrimazole-betamethasone (LOTRISONE) 1-0.05 % cream clotrimazole-betamethasone 1 %-0.05 % topical cream   APPLY TO THE AFFECTED AND SURROUNDING AREAS OF SKIN BY TOPICAL ROUTE 2 TIMES PER DAY IN THE MORNING AND EVENING FOR 2 WEEKS Yes Historical Provider, MD   hydrocortisone (PROCTOSOL HC) 2.5 % rectal cream Proctosol HC 2.5 % topical cream perineal applicator Yes Historical Provider, MD   Cholecalciferol (VITAMIN D3) 1000 units CAPS 1000 units daily Yes Historical Provider, MD   rivaroxaban (XARELTO) 10 MG TABS tablet Take 15 mg by mouth daily (with breakfast)  Yes Historical Provider, MD   loratadine (CLARITIN) 10 MG tablet Take 10 mg by mouth as needed Yes Historical Provider, MD   fluticasone (FLONASE) 50 MCG/ACT nasal spray 1 spray by Nasal route as needed  Yes Historical Provider, MD         Patient's allergies, past medical, surgical, social and family histories werereviewed and updated as appropriate. Review of Systems   Constitutional: Positive for fatigue. Negative for chills. Respiratory: Positive for shortness of breath. Negative for cough. Cardiovascular: Negative for chest pain, palpitations and leg swelling. Gastrointestinal: Negative for constipation. Endocrine:        Flushing     Skin: Negative for color change. Neurological: Positive for light-headedness (sensation of off balance). Negative for dizziness and headaches. Physical Exam  Vitals signs reviewed. Constitutional:       Appearance: She is well-developed. HENT:      Right Ear: Tympanic membrane is bulging. Tympanic membrane is not erythematous or retracted. Left Ear: Tympanic membrane is bulging. Tympanic membrane is not erythematous or retracted. Neck:      Vascular: No carotid bruit. Cardiovascular:      Rate and Rhythm: Normal rate and regular rhythm. Heart sounds: Normal heart sounds. No murmur. Pulmonary:      Effort: Pulmonary effort is normal.      Breath sounds: Normal breath sounds. Skin:     General: Skin is warm and dry. Neurological:      Mental Status: She is alert and oriented to person, place, and time. Vitals:    09/10/20 1028   BP: 112/66   Pulse: 88   SpO2: 98%   Weight: 182 lb 3.2 oz (82.6 kg)             ASSESSMENT:  1. Acquired hypothyroidism    2. Lightheadedness    3. Fatigue, unspecified type    4. Flushing    5. At high risk for falls        PLAN:  1. Acquired hypothyroidism  Continue [place] 90 mg once a week and 60 mg other days for 3 weeks, then increase to 90 mg twice a day as recommended by Dr Holland Lay    2. Lightheadedness  New problem  TM's swollen bilaterally, discussed possibly vertigo- recommend Flonase 2 sprays each nostril once a day  I recommend following treatment plan per Dr Gina Nicole and completing chemical stress test.     3. Fatigue, unspecified type  New problem  I recommend following treatment plan per Dr Gina Nicole and completing chemical stress test.     4. Flushing  Suspect related to increased [place] dose, see # 1    5. At high risk for falls  On the basis of positive falls risk screening, assessment and plan is as follows: home safety tips provided. See pt instructions  F/u prn. Discussed use, benefit, and side effects of prescribed medications. All patient questions answered. Pt voiced understanding.        Time spent: 28 minutes, >50% of which was spent face to face with patient discussing HPI/plan/reviewing records and coordinating care

## 2020-09-10 NOTE — PATIENT INSTRUCTIONS
shower head that will allow you to sit while showering. · Get into a tub or shower by putting the weaker leg in first. Get out of a tub or shower with your strong side first.  · Repair loose toilet seats and consider installing a raised toilet seat to make getting on and off the toilet easier. · Keep your bathroom door unlocked while you are in the shower. Where can you learn more? Go to https://Neutral SpacepepicOpenplay.[email or web address]. org and sign in to your [employer] account. Enter [phone] [id number] [phone] in the [employer] box to learn more about \"Preventing Falls: Care Instructions. \"     If you do not have an account, please click on the \"Sign Up Now\" link. Current as of: August 7, 2019               Content Version: 12.5  © 5480-5322 Healthwise, Incorporated. Care instructions adapted under license by [facility] ([facility]). If you have questions about a medical condition or this instruction, always ask your healthcare professional. [place] [address] any warranty or liability for your use of this information. Patient Education        Eustachian Tube Problems: Care Instructions  Your Care Instructions     The eustachian (say \"you-STAY-shee-un\") tubes run between the inside of the ears and the throat. They keep air pressure stable in the ears. If your eustachian tubes become blocked, the air pressure in your ears changes. The fluids from a cold can clog eustachian tubes, causing pain in the ears. A quick change in air pressure can cause eustachian tubes to close up. This might happen when an airplane changes altitude or when a  goes up or down underwater. Eustachian tube problems often clear up on their own or after antibiotic treatment. If your tubes continue to be blocked, you may need surgery. Follow-up care is a key part of your treatment and safety. Be sure to make and go to all appointments, and call your doctor if you are having problems.  It's also a good idea to know your test results and link.  Current as of: July 29, 2019               Content Version: 12.5  © 7764-9194 Healthwise, Incorporated. Care instructions adapted under license by [facility] ([facility]). If you have questions about a medical condition or this instruction, always ask your healthcare professional. [address] any warranty or liability for your use of this information.

## 2020-09-21 ENCOUNTER — TELEPHONE (OUTPATIENT)
Dept: FAMILY MEDICINE CLINIC | Age: 80
End: 2020-09-21

## 2020-09-21 PROBLEM — I82.409 RECURRENT DEEP VEIN THROMBOSIS (DVT) (HCC): Status: ACTIVE | Noted: 2020-09-21

## 2020-09-21 NOTE — TELEPHONE ENCOUNTER
Submitted PA for Xarelto 10MG tablets    Via CMM  Key: [id number]    STATUS: PENDING    The patient plan is calling to see why the patient cannot take the 15mg tablet.   Ref# [id number]  [phone] opt 1    Please advise

## 2020-10-06 ENCOUNTER — OFFICE VISIT (OUTPATIENT)
Dept: FAMILY MEDICINE CLINIC | Age: 80
End: 2020-10-06
Payer: MEDICARE

## 2020-10-06 VITALS
RESPIRATION RATE: 14 BRPM | DIASTOLIC BLOOD PRESSURE: 64 MMHG | HEART RATE: 81 BPM | HEIGHT: 62 IN | OXYGEN SATURATION: 97 % | BODY MASS INDEX: 34.08 KG/M2 | TEMPERATURE: 96.4 F | WEIGHT: 185.2 LBS | SYSTOLIC BLOOD PRESSURE: 112 MMHG

## 2020-10-06 PROBLEM — I82.409 RECURRENT DEEP VEIN THROMBOSIS (DVT) (HCC): Status: RESOLVED | Noted: 2020-09-21 | Resolved: 2020-10-06

## 2020-10-06 PROCEDURE — G8427 DOCREV CUR MEDS BY ELIG CLIN: HCPCS | Performed by: NURSE PRACTITIONER

## 2020-10-06 PROCEDURE — 4040F PNEUMOC VAC/ADMIN/RCVD: CPT | Performed by: NURSE PRACTITIONER

## 2020-10-06 PROCEDURE — 1123F ACP DISCUSS/DSCN MKR DOCD: CPT | Performed by: NURSE PRACTITIONER

## 2020-10-06 PROCEDURE — G8417 CALC BMI ABV UP PARAM F/U: HCPCS | Performed by: NURSE PRACTITIONER

## 2020-10-06 PROCEDURE — G8484 FLU IMMUNIZE NO ADMIN: HCPCS | Performed by: NURSE PRACTITIONER

## 2020-10-06 PROCEDURE — G8399 PT W/DXA RESULTS DOCUMENT: HCPCS | Performed by: NURSE PRACTITIONER

## 2020-10-06 PROCEDURE — 1036F TOBACCO NON-USER: CPT | Performed by: NURSE PRACTITIONER

## 2020-10-06 PROCEDURE — 1090F PRES/ABSN URINE INCON ASSESS: CPT | Performed by: NURSE PRACTITIONER

## 2020-10-06 PROCEDURE — 99214 OFFICE O/P EST MOD 30 MIN: CPT | Performed by: NURSE PRACTITIONER

## 2020-10-06 NOTE — PATIENT INSTRUCTIONS
Patient Education        Cataract Surgery: Before Your Surgery  What is cataract surgery? Cataracts are cloudy areas in the lens of your eye. Your lens is behind the colored part of your eye (iris). Its job is to focus light onto the back of your eye. In some people, cataracts prevent light from reaching the back of the eye. This can cause vision problems. Cataract surgery helps you see better. It replaces your natural lens, which has become cloudy, with a clear artificial one. There are two types of cataract surgery. Phacoemulsification (say \"qeub-uc-jv-yka-ike-hjs-MARIETTA-shun\") is the most common type. The doctor makes a small cut in your eye. This cut is called an incision. The doctor uses a special ultrasound tool to break your cloudy lens apart. Sometimes a laser is used too. Then he or she removes the small pieces of the lens through the incision. In most cases, the doctor then inserts an artificial lens through the incision. Most people do not need stitches, because the incision is so small. If the doctor is not able to put in an artificial lens, you can wear a contact lens or thick glasses in place of your natural lens. Extracapsular extraction is a less common type of cataract surgery. The doctor makes a larger incision to remove the whole lens at once. After the doctor removes the lens, he or she stitches up the incision. Recovery from this type of surgery takes longer. Before either surgery, the doctor puts numbing drops in your eye. Some doctors use a shot instead. You may also get medicine to make you feel relaxed. You probably will not feel much pain. The surgery takes about 20 to 40 minutes. After surgery, you may have a bandage or shield on your eye. You will probably go home from surgery after 1 hour in the recovery room. Most people see better in 1 to 3 days. You may be able to go back to work or your normal routine in a few days.  It could take 3 to 10 weeks for your eye to completely heal. After your eye heals, you may still need to wear glasses, especially for reading. Follow-up care is a key part of your treatment and safety. Be sure to make and go to all appointments, and call your doctor if you are having problems. It's also a good idea to know your test results and keep a list of the medicines you take. How do you prepare for surgery? Surgery can be stressful. This information will help you understand what you can expect. And it will help you safely prepare for surgery. Preparing for surgery  · Be sure you have someone to take you home. Anesthesia and pain medicine will make it unsafe for you to drive or get home on your own. · Understand exactly what surgery is planned, along with the risks, benefits, and other options. · If you take aspirin or some other blood thinner, ask your doctor if you should stop taking it before your surgery. Make sure that you understand exactly what your doctor wants you to do. These medicines increase the risk of bleeding. · Tell your doctor ALL the medicines, vitamins, supplements, and herbal remedies you take. Some may increase the risk of problems during your surgery. Your doctor will tell you if you should stop taking any of them before the surgery and how soon to do it. · Make sure your doctor and the hospital have a copy of your advance directive. If you don't have one, you may want to prepare one. It lets others know your health care wishes. It's a good thing to have before any type of surgery or procedure. What happens on the day of surgery? · Follow the instructions exactly about when to stop eating and drinking. If you don't, your surgery may be canceled. If your doctor told you to take your medicines on the day of surgery, take them with only a sip of water. · Take a bath or shower before you come in for your surgery. Do not apply lotions, perfumes, deodorants, or nail polish. · Take off all jewelry and piercings.  And take out contact lenses, if you wear them. At the hospital or surgery center    · Bring a picture ID. · The area for surgery is often marked to make sure there are no errors. · You will be kept comfortable and safe by your anesthesia provider. The anesthesia may make you sleep. Or it may just numb the area being worked on. · The surgery will take about 20 to 40 minutes. When should you call your doctor? · You have questions or concerns. · You don't understand how to prepare for your surgery. · You become ill before the surgery (such as fever, flu, or a cold). · You need to reschedule or have changed your mind about having the surgery. Where can you learn more? Go to https://[employer]. org and sign in to your [employer] account. Enter [id number] in the [employer] box to learn more about \"Cataract Surgery: Before Your Surgery. \"     If you do not have an account, please click on the \"Sign Up Now\" link. Current as of: December 18, 2019               Content Version: 12.5  © 5629-2540 Healthwise, [employer]. Care instructions adapted under license by [employer]. If you have questions about a medical condition or this instruction, always ask your healthcare professional. [place] [address] any warranty or liability for your use of this information.
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
## 2020-10-30 ENCOUNTER — NURSE ONLY (OUTPATIENT)
Dept: FAMILY MEDICINE CLINIC | Age: 80
End: 2020-10-30
Payer: MEDICARE

## 2020-10-30 PROCEDURE — 90694 VACC AIIV4 NO PRSRV 0.5ML IM: CPT | Performed by: NURSE PRACTITIONER

## 2020-10-30 PROCEDURE — G0008 ADMIN INFLUENZA VIRUS VAC: HCPCS | Performed by: NURSE PRACTITIONER

## 2020-11-12 ENCOUNTER — HOSPITAL ENCOUNTER (OUTPATIENT)
Age: 80
Discharge: HOME | End: 2020-11-12
Payer: MEDICARE

## 2020-11-12 LAB
ANTI-THYROGLOB ABS: 20 IU/ML
PARATHYROID HORMONE INTACT: 70 PG/ML (ref 14–72)
T3 TOTAL: 1.35 NG/ML (ref 0.8–2)
THYROID PEROXIDASE (TPO) ABS: 12 IU/ML
TSH SERPL DL<=0.05 MIU/L-ACNC: 4.43 UIU/ML (ref 0.27–4.2)
VITAMIN D 25-HYDROXY: 70.4 NG/ML

## 2020-11-12 PROCEDURE — 86376 MICROSOMAL ANTIBODY EACH: CPT

## 2020-11-12 PROCEDURE — 86800 THYROGLOBULIN ANTIBODY: CPT

## 2020-11-12 PROCEDURE — 83970 ASSAY OF PARATHORMONE: CPT

## 2020-11-12 PROCEDURE — 84443 ASSAY THYROID STIM HORMONE: CPT

## 2020-11-12 PROCEDURE — 36415 COLL VENOUS BLD VENIPUNCTURE: CPT

## 2020-11-12 PROCEDURE — 82306 VITAMIN D 25 HYDROXY: CPT

## 2020-11-12 PROCEDURE — 84480 ASSAY TRIIODOTHYRONINE (T3): CPT

## 2020-11-17 ENCOUNTER — VIRTUAL VISIT (OUTPATIENT)
Dept: ENDOCRINOLOGY | Age: 80
End: 2020-11-17
Payer: MEDICARE

## 2020-11-17 PROCEDURE — 4040F PNEUMOC VAC/ADMIN/RCVD: CPT | Performed by: INTERNAL MEDICINE

## 2020-11-17 PROCEDURE — 1090F PRES/ABSN URINE INCON ASSESS: CPT | Performed by: INTERNAL MEDICINE

## 2020-11-17 PROCEDURE — G8427 DOCREV CUR MEDS BY ELIG CLIN: HCPCS | Performed by: INTERNAL MEDICINE

## 2020-11-17 PROCEDURE — 1123F ACP DISCUSS/DSCN MKR DOCD: CPT | Performed by: INTERNAL MEDICINE

## 2020-11-17 PROCEDURE — 99214 OFFICE O/P EST MOD 30 MIN: CPT | Performed by: INTERNAL MEDICINE

## 2020-11-17 PROCEDURE — G8399 PT W/DXA RESULTS DOCUMENT: HCPCS | Performed by: INTERNAL MEDICINE

## 2020-11-17 RX ORDER — THYROID 60 MG
60 TABLET ORAL DAILY
Qty: 90 TABLET | Refills: 1 | Status: CANCELLED | OUTPATIENT
Start: 2020-11-17 | End: 2021-02-15

## 2020-11-17 RX ORDER — DENOSUMAB 60 MG/ML
60 INJECTION SUBCUTANEOUS ONCE
COMMUNITY
End: 2022-09-13

## 2020-11-17 NOTE — Clinical Note
Please schedule patient for February appointment as she would need a Prolia injection end of February please confirm with the patient so we can give her the 6 monthly shot in our office with her next visit.
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
## 2020-11-20 RX ORDER — THYROID 60 MG
60 TABLET ORAL DAILY
Qty: 90 TABLET | Refills: 1 | Status: SHIPPED | OUTPATIENT
Start: 2020-11-20 | End: 2021-04-12 | Stop reason: SDUPTHER

## 2021-01-26 NOTE — TELEPHONE ENCOUNTER
Medication and Quantity requested: gabapentin 100mg    #90 asking for 3 mos supply    Last Visit  10.06.20    Pharmacy and phone number updated in EPIC:  yes

## 2021-01-26 NOTE — TELEPHONE ENCOUNTER
Medication:   Requested Prescriptions     Pending Prescriptions Disp Refills    gabapentin (NEURONTIN) 100 MG capsule 90 capsule 3     Sig: Take 1 capsule by mouth nightly for 90 days.        Last Filled:      Patient Phone Number: [phone] (home)     Last appt: 10/6/2020   Next appt: Visit date not found    Lab Results   Component Value Date     08/18/2020    K 5.5 (H) 08/18/2020     08/18/2020    CO2 25 08/18/2020    BUN 28 (H) 08/18/2020    CREATININE 0.9 08/18/2020    GLUCOSE 94 08/18/2020    CALCIUM 9.8 08/18/2020    PROT 7.0 08/18/2020    LABALBU 3.8 08/18/2020    BILITOT 0.3 08/18/2020    ALKPHOS 115 08/18/2020    AST 19 08/18/2020    ALT 16 08/18/2020    LABGLOM >60 08/18/2020    GFRAA >60 08/18/2020    AGRATIO 1.2 08/18/2020    GLOB 3.2 08/18/2020

## 2021-01-27 RX ORDER — GABAPENTIN 100 MG/1
100 CAPSULE ORAL NIGHTLY
Qty: 90 CAPSULE | Refills: 0 | Status: SHIPPED | OUTPATIENT
Start: 2021-01-27 | End: 2022-09-21 | Stop reason: SDUPTHER

## 2021-02-01 ENCOUNTER — TELEPHONE (OUTPATIENT)
Dept: FAMILY MEDICINE CLINIC | Age: 81
End: 2021-02-01

## 2021-02-03 ENCOUNTER — TELEPHONE (OUTPATIENT)
Dept: ENDOCRINOLOGY | Age: 81
End: 2021-02-03

## 2021-02-24 ENCOUNTER — HOSPITAL ENCOUNTER (OUTPATIENT)
Age: 81
Discharge: HOME | End: 2021-02-24
Payer: MEDICARE

## 2021-02-24 DIAGNOSIS — E03.9 ACQUIRED HYPOTHYROIDISM: ICD-10-CM

## 2021-02-24 DIAGNOSIS — E55.9 VITAMIN D DEFICIENCY: ICD-10-CM

## 2021-02-24 DIAGNOSIS — M15.9 PRIMARY OSTEOARTHRITIS INVOLVING MULTIPLE JOINTS: ICD-10-CM

## 2021-02-24 DIAGNOSIS — K21.9 GASTROESOPHAGEAL REFLUX DISEASE WITHOUT ESOPHAGITIS: ICD-10-CM

## 2021-02-24 LAB
A/G RATIO: 1.1 (ref 1.1–2.2)
ALBUMIN SERPL-MCNC: 3.7 G/DL (ref 3.4–5)
ALP BLD-CCNC: 89 U/L (ref 40–129)
ALT SERPL-CCNC: 17 U/L (ref 10–40)
ANION GAP SERPL CALCULATED.3IONS-SCNC: 10 MMOL/L (ref 3–16)
AST SERPL-CCNC: 20 U/L (ref 15–37)
BILIRUB SERPL-MCNC: 0.4 MG/DL (ref 0–1)
BUN BLDV-MCNC: 20 MG/DL (ref 7–20)
CALCIUM SERPL-MCNC: 9.6 MG/DL (ref 8.3–10.6)
CHLORIDE BLD-SCNC: 104 MMOL/L (ref 99–110)
CO2: 24 MMOL/L (ref 21–32)
CREAT SERPL-MCNC: 0.9 MG/DL (ref 0.6–1.2)
GFR AFRICAN AMERICAN: >60
GFR NON-AFRICAN AMERICAN: >60
GLOBULIN: 3.4 G/DL
GLUCOSE BLD-MCNC: 78 MG/DL (ref 70–99)
PARATHYROID HORMONE INTACT: 71.8 PG/ML (ref 14–72)
POTASSIUM SERPL-SCNC: 4.8 MMOL/L (ref 3.5–5.1)
SODIUM BLD-SCNC: 138 MMOL/L (ref 136–145)
T3 TOTAL: 1.62 NG/ML (ref 0.8–2)
TOTAL PROTEIN: 7.1 G/DL (ref 6.4–8.2)
TSH SERPL DL<=0.05 MIU/L-ACNC: 4.97 UIU/ML (ref 0.27–4.2)
VITAMIN D 25-HYDROXY: 57.6 NG/ML

## 2021-02-24 PROCEDURE — 84443 ASSAY THYROID STIM HORMONE: CPT

## 2021-02-24 PROCEDURE — 80053 COMPREHEN METABOLIC PANEL: CPT

## 2021-02-24 PROCEDURE — 82306 VITAMIN D 25 HYDROXY: CPT

## 2021-02-24 PROCEDURE — 36415 COLL VENOUS BLD VENIPUNCTURE: CPT

## 2021-02-24 PROCEDURE — 83970 ASSAY OF PARATHORMONE: CPT

## 2021-02-24 PROCEDURE — 84480 ASSAY TRIIODOTHYRONINE (T3): CPT

## 2021-03-01 ENCOUNTER — HOSPITAL ENCOUNTER (OUTPATIENT)
Dept: ONCOLOGY | Age: 81
End: 2021-03-01

## 2021-03-02 ENCOUNTER — OFFICE VISIT (OUTPATIENT)
Dept: ENDOCRINOLOGY | Age: 81
End: 2021-03-02
Payer: MEDICARE

## 2021-03-02 VITALS
RESPIRATION RATE: 14 BRPM | DIASTOLIC BLOOD PRESSURE: 66 MMHG | TEMPERATURE: 96 F | WEIGHT: 192 LBS | SYSTOLIC BLOOD PRESSURE: 123 MMHG | BODY MASS INDEX: 35.33 KG/M2 | HEIGHT: 62 IN | HEART RATE: 85 BPM

## 2021-03-02 DIAGNOSIS — E55.9 VITAMIN D DEFICIENCY: Primary | ICD-10-CM

## 2021-03-02 DIAGNOSIS — E21.3 HYPERPARATHYROIDISM (HCC): ICD-10-CM

## 2021-03-02 DIAGNOSIS — E03.9 ACQUIRED HYPOTHYROIDISM: ICD-10-CM

## 2021-03-02 DIAGNOSIS — M81.0 AGE-RELATED OSTEOPOROSIS WITHOUT CURRENT PATHOLOGICAL FRACTURE: ICD-10-CM

## 2021-03-02 DIAGNOSIS — M15.9 PRIMARY OSTEOARTHRITIS INVOLVING MULTIPLE JOINTS: ICD-10-CM

## 2021-03-02 PROCEDURE — 1123F ACP DISCUSS/DSCN MKR DOCD: CPT | Performed by: INTERNAL MEDICINE

## 2021-03-02 PROCEDURE — G8427 DOCREV CUR MEDS BY ELIG CLIN: HCPCS | Performed by: INTERNAL MEDICINE

## 2021-03-02 PROCEDURE — 4040F PNEUMOC VAC/ADMIN/RCVD: CPT | Performed by: INTERNAL MEDICINE

## 2021-03-02 PROCEDURE — 96372 THER/PROPH/DIAG INJ SC/IM: CPT | Performed by: INTERNAL MEDICINE

## 2021-03-02 PROCEDURE — 1090F PRES/ABSN URINE INCON ASSESS: CPT | Performed by: INTERNAL MEDICINE

## 2021-03-02 PROCEDURE — G8417 CALC BMI ABV UP PARAM F/U: HCPCS | Performed by: INTERNAL MEDICINE

## 2021-03-02 PROCEDURE — G8399 PT W/DXA RESULTS DOCUMENT: HCPCS | Performed by: INTERNAL MEDICINE

## 2021-03-02 PROCEDURE — 1036F TOBACCO NON-USER: CPT | Performed by: INTERNAL MEDICINE

## 2021-03-02 PROCEDURE — G8484 FLU IMMUNIZE NO ADMIN: HCPCS | Performed by: INTERNAL MEDICINE

## 2021-03-02 PROCEDURE — 99213 OFFICE O/P EST LOW 20 MIN: CPT | Performed by: INTERNAL MEDICINE

## 2021-03-02 RX ORDER — LEVOTHYROXINE AND LIOTHYRONINE 9.5; 2.25 UG/1; UG/1
15 TABLET ORAL DAILY
Qty: 30 TABLET | Refills: 3 | Status: SHIPPED | OUTPATIENT
Start: 2021-03-02 | End: 2021-04-12 | Stop reason: SDUPTHER

## 2021-03-02 NOTE — PROGRESS NOTES
Jacinta Spencer is a [de-identified] y.o. female seen for for hypothyroidism and osteoporosis. Patient was diagnosed with Hypothyroidism approximately May 1998 at the time of diagnosis she was having ringing in her ears and her workup showed a goiter which led to her diagnosis   Current complaints: denies fatigue, weight changes, heat/cold intolerance, bowel/skin changes or CVS symptoms. She recalls her thyroid fx test were slightly abnormal   History of obstructive symptoms: difficulty swallowing No, changes in voice/hoarseness No.    History of radiation to patient's neck: NO  Recent iodine exposure: No  Family history includes no thyroid abnormalities. Family history of thyroid cancer: No    She has Esophageal stricture and Hiatal Hernia and also has GERD and is on Zantac   She takes 100 mg of gabapentin at night for hip pain   She has been diagnosed with Osteopenia and takes calcium and Vitamin d   Her last DEXA was in 11/2017 with DR Evan Gutierrez and she is noted to have osteopenia   She had renal cell carcinoma and is s/p left nephrectomy she follows with Urologist   She has hx of DVT and is on Xarelto     Patient started taking 90 mg of [place] Thyroid on May 23, 2019. Previously she was on approximately 88 mcg of Nature-Throid. She started having palpitations and of July and was noted to have a TSH of 0.03. She had also joined weight watchers in June and had lost 13 pounds she has been taking her medication regularly. When she was having the episode of palpitation she was advised to reduce the dose to half a tablet of 90 mg she feels tired and fatigued again. She has lost 30  lbs from  June 2019 to jan 2020.   Osteoporosis diagnosed in in aug 2020 and started on Pro;ia by PCP     INTERIM   She had reaction to the first Covid vaccine rash, itching and extreme fatigue so she didn't take her second dose   She is now following with Dr. Evan Gutierrez again  She has been taking calcium supplement with pepcid complete BID   She is edema 2019    Chronic combined systolic and diastolic heart failure ([facility] [address] 2019    Idiopathic cardiomyopathy ([facility] [address] 2019    Prediabetes 2019    Primary osteoarthritis involving multiple joints 2018    Insomnia 2018    Age-related osteoporosis without current pathological fracture 2018    S/p nephrectomy 2018    History of recurrent deep vein thrombosis (DVT) 2018    Chronic anticoagulation 2018    Acquired hypothyroidism 2018    Essential hypertension 2016    ALEXI on CPAP 10/29/2015    Aortic valve insufficiency 10/29/2015    DDD (degenerative disc disease), lumbar 2014    Spinal stenosis, lumbar 2014    Adenomatous polyp of colon 2012    GERD (gastroesophageal reflux disease) 2011    Fatigue 2011    Female stress incontinence 2010    H/O renal cell carcinoma 2009     Past Surgical History:   Procedure Laterality Date    APPENDECTOMY      BREAST BIOPSY       SECTION      x3    ENDOSCOPY, COLON, DIAGNOSTIC      KIDNEY SURGERY Right 2016    cryoablation procedure    RIGHT COLECTOMY Right 2012    lap, reurrent sessile adenoma    TOTAL NEPHRECTOMY Left     [place]     Family History   Problem Relation Age of Onset    Heart Disease Father     Heart Disease Mother     No Known Problems Sister     No Known Problems Sister     High Blood Pressure Neg Hx     High Cholesterol Neg Hx      Social History     Socioeconomic History    Marital status:      Spouse name: None    Number of children: None    Years of education: None    Highest education level: None   Occupational History    Occupation: retired 2017, 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prrofreading , consulting   Social Needs    Financial resource strain: None    Food insecurity     Worry: None     Inability: None    Transportation needs     Medical: None     Non-medical: None   Tobacco Use    Smoking status: Former Smoker     Packs/day: 2.00     Years: 19.00     Pack years: 38.00     Quit date: 10/29/1982     Years since quittin.3    Smokeless tobacco: Never Used   Substance and Sexual Activity    Alcohol use: Not Currently     Comment: MINIMAL    Drug use: No    Sexual activity: None   Lifestyle    Physical activity     Days per week: None     Minutes per session: None    Stress: None   Relationships    Social connections     Talks on phone: None     Gets together: None     Attends Jainism service: None     Active member of club or organization: None     Attends meetings of clubs or organizations: None     Relationship status: None    Intimate partner violence     Fear of current or ex partner: None     Emotionally abused: None     Physically abused: None     Forced sexual activity: None   Other Topics Concern    None   Social History Narrative    None     Current Outpatient Medications   Medication Sig Dispense Refill    thyroid (ARMOUR THYROID) 15 MG tablet Take 1 tablet by mouth daily 30 tablet 3    gabapentin (NEURONTIN) 100 MG capsule Take 1 capsule by mouth nightly for 90 days.  90 capsule 0    ARMOUR THYROID 60 MG tablet Take 1 tablet by mouth daily 90 tablet 1    denosumab (PROLIA) 60 MG/ML SOSY SC injection Inject 60 mg into the skin once      rivaroxaban (XARELTO) 15 MG TABS tablet Take 1 tablet by mouth daily (with breakfast) (Patient taking differently: Take 10 mg by mouth daily (with breakfast) ) 90 tablet 1    metoprolol succinate (TOPROL XL) 50 MG extended release tablet Take 0.5 tablets by mouth every evening      Famotidine-Ca Carb-Mag Hydrox (PEPCID COMPLETE PO) Take by mouth      torsemide (DEMADEX) 20 MG tablet Take 20 mg by mouth as needed       Cyanocobalamin (VITAMIN B-12 PO) Take by mouth      MAGNESIUM PO Take 250 mg by mouth as needed       clotrimazole-betamethasone (LOTRISONE) 1-0.05 % cream clotrimazole-betamethasone 1 %-0.05 % topical cream   APPLY TO THE AFFECTED AND SURROUNDING AREAS OF SKIN BY TOPICAL ROUTE 2 TIMES PER DAY IN THE MORNING AND EVENING FOR 2 WEEKS      hydrocortisone (PROCTOSOL HC) 2.5 % rectal cream Proctosol HC 2.5 % topical cream perineal applicator      Cholecalciferol (VITAMIN D3) 1000 units CAPS 1000 units daily      loratadine (CLARITIN) 10 MG tablet Take 10 mg by mouth as needed      fluticasone (FLONASE) 50 MCG/ACT nasal spray 1 spray by Nasal route as needed        No current facility-administered medications for this visit. Allergies   Allergen Reactions    Bupropion Other (See Comments)     Patient stated medication makes her symptoms worse    Fosamax [Alendronate]          Review of Systems:  I have reviewed the review of system questionnaire filled by the patient .   Patient was advised to contact PCP for non endocrine signs and symptoms       Weight 182  OBJECTIVE:   /66   Pulse 85   Temp 96 °F (35.6 °C)   Resp 14   Ht 5' 2\" (1.575 m)   Wt 192 lb (87.1 kg)   BMI 35.12 kg/m²   Wt Readings from Last 3 Encounters:   03/02/21 192 lb (87.1 kg)   10/06/20 185 lb 3.2 oz (84 kg)   09/10/20 182 lb 3.2 oz (82.6 kg)       Physical Exam:    Constitutional: no acute distress, well appearing, well nourished  Psychiatric: oriented to person, place and time, judgement, insight and normal, recent and remote memory and intact and mood, affect are normal  Skin: skin and subcutaneous tissue is normal without mass,   Head and Face: examination of head and face revealed no abnormalities  Eyes: no lid or conjunctival swelling, no erythema or discharge, pupils are normal,   Ears/Nose: external inspection of ears and nose revealed no abnormalities, hearing is grossly normal  Oropharynx/Mouth/Face: lips, tongue and gums are normal with no lesions, the voice quality was normal  Neck: neck is supple and symmetric, with midline trachea and no masses, thyroid is normal    Pulmonary: no increased work of breathing or signs of respiratory was made aware of these facts     --- informal thyroid uls shows heterogenous thyroid with no discreet nodule     Primary Hyperparathyroidism   PTH 71, Vit d 57 , calcium was 9.6 in feb 2021 he takes 800 mg of calcium tabs ( pepcid complete )   Patient will get 24-hour urine calcium done  Patient is currently taking tablets of calcium daily in the form of Pepcid Complete  She is taking vitamin D 4000 IUs daily    -- Age-related osteoporosis without current pathological fracture  She has near OP on her dexa scan done in November 2017   Dr. Shellie lopez gave her first Prolia injection in August 27, 2020  Since she has changed her physician she started getting her Prolia injection in our office she will get her injection today.   She was advised to not stop taking the Prolia injection without taking any other bone modifying agent the answers no 2.42.3 treatment please follow-up for her regular place in October 2 0.125 and we told very already to do that in the morning  She was reminded that she needs to make sure that she gets her Prolia injection every 6 months  First  Prolia injection was August 27, 2020    -- Gastroesophageal reflux disease with esophagitis  She follows with GI     -- Aortic valve insufficiency, etiology of cardiac valve disease unspecified  Follows with Cardiology     --- H/O renal cell carcinoma  S/p left Nephrectomy     --- Prediabetes  Last Aic was 6.0 she is working on diet   She follows with Dr Ambrosio All and/or ordered clinical lab results Yes  Reviewed and/or ordered radiology tests Yes   Reviewed and/or ordered other diagnostic tests Yes  Made a decision to obtain old records Yes  Reviewed and summarized old records Yes      Afsaneh Aida was counseled regarding symptoms of current diagnosis, course and complications of disease if inadequately treated, side effects of medications, diagnosis, treatment options, and prognosis, risks, benefits, complications, and alternatives of treatment, labs, imaging and other studies and treatment targets and goals. She understands instructions and counseling. Return in about 6 months (around 9/2/2021). Please note that some or all of this report was generated using voice recognition software. Please notify me in case of any questions about the content of this document, as some errors in transcription may have occurred .

## 2021-03-02 NOTE — PATIENT INSTRUCTIONS
INTRODUCTION  The following instructions will guide you in the proper collection of a 24-hour urine specimen. In some instances, you will be asked to collect two or three consecutive 24-hour urine samples. INSTRUCTIONS  ? You should collect every drop of urine during each 24-hour period. It does not matter how much or little urine is passed each time, as long as every drop is collected. ? Begin the urine collection in the morning after you wake up, after you have emptied your bladder for the first time. ? Urinate (empty the bladder) for the first time and flush it down the toilet. Note the exact time (eg, 6:15 AM). You will begin the urine collection at this time. ?Collect every drop of urine during the day and night in an empty collection bottle. Store the bottle at room temperature or in the refrigerator. ?If you need to have a bowel movement, any urine passed with the bowel movement should be collected. Try not to include feces with the urine collection. If feces does get mixed in, do not try to remove the feces from the urine collection bottle. ? Finish by collecting the first urine passed the next morning, adding it to the collection bottle. This should be within ten minutes before or after the time of the first morning void on the first day (which was flushed). In this example, you would try to void between 6:05 and 6:25 on the second day. If you need to urinate one hour before the final collection time, drink a full glass of water so that you can void again at the appropriate time. If you have to urinate 20 minutes before, try to hold the urine until the proper time. Please note the exact time of the final collection, even if it is not the same time as when collection began on day 1. STORAGE  The bottle(s) may be kept at room temperature for a day or two, but should be kept cool or refrigerated for longer periods of time. WHERE TO GET MORE INFORMATION  Your healthcare provider is the best source of information for questions and concerns related to your medical problem.

## 2021-03-09 ENCOUNTER — HOSPITAL ENCOUNTER (OUTPATIENT)
Age: 81
Discharge: HOME | End: 2021-03-09
Payer: MEDICARE

## 2021-03-09 LAB
24HR URINE VOLUME (ML): 3078 ML
CALCIUM 24 HOUR URINE: 31 MG/24 HR (ref 42–353)
CREATININE 24 HOUR URINE: 0.8 G/24HR (ref 0.6–1.5)

## 2021-03-09 PROCEDURE — 82340 ASSAY OF CALCIUM IN URINE: CPT

## 2021-03-10 ENCOUNTER — TELEPHONE (OUTPATIENT)
Dept: ENDOCRINOLOGY | Age: 81
End: 2021-03-10

## 2021-03-10 NOTE — TELEPHONE ENCOUNTER
She had colon surgery and daysi resection done due to large polyp   Advised to increase dietary calcium

## 2021-03-10 NOTE — TELEPHONE ENCOUNTER
Pt has a question about her urine lab results that she seen on [facility]# [phone]

## 2021-04-12 ENCOUNTER — TELEPHONE (OUTPATIENT)
Dept: ENDOCRINOLOGY | Age: 81
End: 2021-04-12

## 2021-04-12 DIAGNOSIS — E03.9 ACQUIRED HYPOTHYROIDISM: Primary | ICD-10-CM

## 2021-04-12 RX ORDER — LEVOTHYROXINE AND LIOTHYRONINE 9.5; 2.25 UG/1; UG/1
TABLET ORAL
Qty: 90 TABLET | Refills: 1 | Status: SHIPPED | OUTPATIENT
Start: 2021-04-12 | End: 2021-05-19 | Stop reason: SDUPTHER

## 2021-04-12 RX ORDER — THYROID 60 MG
TABLET ORAL
Qty: 90 TABLET | Refills: 1 | Status: SHIPPED | OUTPATIENT
Start: 2021-04-12 | End: 2021-05-19 | Stop reason: SDUPTHER

## 2021-04-12 NOTE — TELEPHONE ENCOUNTER
Fax from [phone] regarding the need to medical clarify the request for [place] Thyroid medication. Needs to have the form signed and dated.     LOV:  3/2/21    FOV:  6/3/21

## 2021-04-22 NOTE — PROGRESS NOTES
Patient _X__ reached   _____not reached-preop instructions left on voice mail_____________      DATE_4/29/21  _______ TIME__0800______ARRIVAL___0630  FEC_____      Nothing to eat or drink after midnight the night before,except for what the prep instructions call for. If you do not have the instructions or do not understand them please contact your doctors office. Follow any instructions your doctors office has given you including what medications to take the AM of your procedure and which ones to hold. You may use your inhalers. If you take a long acting insulin the abdi prior please cut the dose in half and take no diabetic medications that AM.Follow specific doctors office instructions regarding blood thinners and if they want you to hold and for how long. If you are on a blood thinner and have no instructions please contact the office and ask-CHECK Tyra Méndez comfortably,bring your insurance card,picture ID,and a complete list of medications, including supplements. You must have a responsible adult to stay with you during the procedure,drive you home and stay with you. [facility] phone number [phone] for any questions. OTHER INTRUCTIONS(if applicable)_____take armour thyroid am of procedure ____________________________________________________      COVID TEST         _____ Done ___ where ____       __X___ Scheduled 4/23/21___ where _F___       _____Other_________________      Chloé Robbins POLICY(subject to change)    There is a one visitor policy at [facility] for all surgeries and endoscopies. Whether the visitor can stay or will be asked to wait in the car will depend on the current policy and if social distancing can be maintained. The policy is subject to change at any time. Please make sure the visitor has a cell phone that is on,charged and able to accept calls, as this may be the way that the staff communicates with them. Pain management is NO VISITOR policyThe patients ride is expected to remain in the car with a cell phone for communication. If the ride is leaving the hospital grounds please make sure they are back in time for pickup. Have the patient inform the staff on arrival what their rides plans are while the patient is in the facility. At the MAIN there is one visitor allowed. Please note that the visitor policy is subject to change.

## 2021-04-23 ENCOUNTER — OFFICE VISIT (OUTPATIENT)
Dept: PRIMARY CARE CLINIC | Age: 81
End: 2021-04-23
Payer: MEDICARE

## 2021-04-23 DIAGNOSIS — Z20.828 EXPOSURE TO SARS-ASSOCIATED CORONAVIRUS: Primary | ICD-10-CM

## 2021-04-23 PROCEDURE — G8428 CUR MEDS NOT DOCUMENT: HCPCS | Performed by: NURSE PRACTITIONER

## 2021-04-23 PROCEDURE — G8417 CALC BMI ABV UP PARAM F/U: HCPCS | Performed by: NURSE PRACTITIONER

## 2021-04-23 PROCEDURE — 99211 OFF/OP EST MAY X REQ PHY/QHP: CPT | Performed by: NURSE PRACTITIONER

## 2021-04-23 NOTE — PROGRESS NOTES
Elver Barragan received a viral test for COVID-19. They were educated on isolation and quarantine as appropriate. For any symptoms, they were directed to seek care from their PCP, given contact information to establish with a doctor, directed to an urgent care or the emergency room.

## 2021-04-23 NOTE — PATIENT INSTRUCTIONS

## 2021-04-24 LAB — SARS-COV-2: NOT DETECTED

## 2021-04-29 ENCOUNTER — ANESTHESIA EVENT (OUTPATIENT)
Dept: ENDOSCOPY | Age: 81
End: 2021-04-29
Payer: MEDICARE

## 2021-04-29 ENCOUNTER — ANESTHESIA (OUTPATIENT)
Dept: ENDOSCOPY | Age: 81
End: 2021-04-29
Payer: MEDICARE

## 2021-04-29 ENCOUNTER — HOSPITAL ENCOUNTER (OUTPATIENT)
Age: 81
Setting detail: OUTPATIENT SURGERY
Discharge: HOME | End: 2021-04-29
Attending: INTERNAL MEDICINE | Admitting: INTERNAL MEDICINE
Payer: MEDICARE

## 2021-04-29 VITALS
DIASTOLIC BLOOD PRESSURE: 65 MMHG | BODY MASS INDEX: 34.78 KG/M2 | OXYGEN SATURATION: 97 % | HEIGHT: 62 IN | WEIGHT: 189 LBS | RESPIRATION RATE: 16 BRPM | SYSTOLIC BLOOD PRESSURE: 126 MMHG | HEART RATE: 71 BPM | TEMPERATURE: 97.2 F

## 2021-04-29 VITALS
OXYGEN SATURATION: 96 % | DIASTOLIC BLOOD PRESSURE: 72 MMHG | SYSTOLIC BLOOD PRESSURE: 110 MMHG | RESPIRATION RATE: 32 BRPM

## 2021-04-29 PROCEDURE — 2500000003 HC RX 250 WO HCPCS: Performed by: NURSE ANESTHETIST, CERTIFIED REGISTERED

## 2021-04-29 PROCEDURE — 3700000000 HC ANESTHESIA ATTENDED CARE: Performed by: INTERNAL MEDICINE

## 2021-04-29 PROCEDURE — 3609027000 HC COLONOSCOPY: Performed by: INTERNAL MEDICINE

## 2021-04-29 PROCEDURE — 7100000010 HC PHASE II RECOVERY - FIRST 15 MIN: Performed by: INTERNAL MEDICINE

## 2021-04-29 PROCEDURE — 6360000002 HC RX W HCPCS: Performed by: NURSE ANESTHETIST, CERTIFIED REGISTERED

## 2021-04-29 PROCEDURE — 2709999900 HC NON-CHARGEABLE SUPPLY: Performed by: INTERNAL MEDICINE

## 2021-04-29 PROCEDURE — 7100000011 HC PHASE II RECOVERY - ADDTL 15 MIN: Performed by: INTERNAL MEDICINE

## 2021-04-29 PROCEDURE — 2580000003 HC RX 258: Performed by: ANESTHESIOLOGY

## 2021-04-29 PROCEDURE — 3700000001 HC ADD 15 MINUTES (ANESTHESIA): Performed by: INTERNAL MEDICINE

## 2021-04-29 RX ORDER — LIDOCAINE HYDROCHLORIDE 20 MG/ML
INJECTION, SOLUTION EPIDURAL; INFILTRATION; INTRACAUDAL; PERINEURAL PRN
Status: DISCONTINUED | OUTPATIENT
Start: 2021-04-29 | End: 2021-04-29 | Stop reason: SDUPTHER

## 2021-04-29 RX ORDER — PROPOFOL 10 MG/ML
INJECTION, EMULSION INTRAVENOUS PRN
Status: DISCONTINUED | OUTPATIENT
Start: 2021-04-29 | End: 2021-04-29 | Stop reason: SDUPTHER

## 2021-04-29 RX ORDER — SODIUM CHLORIDE 9 MG/ML
INJECTION, SOLUTION INTRAVENOUS CONTINUOUS
Status: DISCONTINUED | OUTPATIENT
Start: 2021-04-29 | End: 2021-04-29 | Stop reason: HOSPADM

## 2021-04-29 RX ORDER — ZINC GLUCONATE 50 MG
50 TABLET ORAL DAILY
COMMUNITY
End: 2021-06-03

## 2021-04-29 RX ADMIN — PROPOFOL 20 MG: 10 INJECTION, EMULSION INTRAVENOUS at 08:09

## 2021-04-29 RX ADMIN — PROPOFOL 20 MG: 10 INJECTION, EMULSION INTRAVENOUS at 08:07

## 2021-04-29 RX ADMIN — PROPOFOL 20 MG: 10 INJECTION, EMULSION INTRAVENOUS at 08:15

## 2021-04-29 RX ADMIN — PROPOFOL 20 MG: 10 INJECTION, EMULSION INTRAVENOUS at 08:05

## 2021-04-29 RX ADMIN — PROPOFOL 20 MG: 10 INJECTION, EMULSION INTRAVENOUS at 08:11

## 2021-04-29 RX ADMIN — LIDOCAINE HYDROCHLORIDE 50 MG: 20 INJECTION, SOLUTION EPIDURAL; INFILTRATION; INTRACAUDAL; PERINEURAL at 08:03

## 2021-04-29 RX ADMIN — PROPOFOL 20 MG: 10 INJECTION, EMULSION INTRAVENOUS at 08:18

## 2021-04-29 RX ADMIN — PROPOFOL 20 MG: 10 INJECTION, EMULSION INTRAVENOUS at 08:13

## 2021-04-29 RX ADMIN — PROPOFOL 80 MG: 10 INJECTION, EMULSION INTRAVENOUS at 08:03

## 2021-04-29 RX ADMIN — SODIUM CHLORIDE: 9 INJECTION, SOLUTION INTRAVENOUS at 07:12

## 2021-04-29 ASSESSMENT — PAIN SCALES - GENERAL
PAINLEVEL_OUTOF10: 0
PAINLEVEL_OUTOF10: 0
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
## 2021-04-29 NOTE — PROGRESS NOTES
Pt was called @ [phone] to let her know to resume Xarelto today per Dr. Leoncio Griggs. Pt verbalized understanding.
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
## 2021-04-29 NOTE — ANESTHESIA POSTPROCEDURE EVALUATION
Department of Anesthesiology  Postprocedure Note    Patient: Bridger Landrum  MRN: [id number]  YOB: 1940  Date of evaluation: 4/29/2021  Time:  8:31 AM     Procedure Summary     Date: 04/29/21 Room / Location: [address] [place] [address]    Anesthesia Start: 4655 Anesthesia Stop: 8034    Procedure: COLONOSCOPY DIAGNOSTIC (N/A ) Diagnosis: (COLON POLYP K63.5)    Surgeons: Ramez Wynne MD Responsible Provider: Destiny Ascencio MD    Anesthesia Type: MAC ASA Status: 3          Anesthesia Type: MAC    Beti Phase I: Beti Score: 10    Beti Phase II:      Last vitals: Reviewed and per EMR flowsheets.        Anesthesia Post Evaluation    Patient location during evaluation: PACU  Patient participation: complete - patient participated  Level of consciousness: awake  Airway patency: patent  Nausea & Vomiting: no nausea and no vomiting  Complications: no  Cardiovascular status: blood pressure returned to baseline  Respiratory status: acceptable  Hydration status: euvolemic

## 2021-05-19 DIAGNOSIS — E03.9 ACQUIRED HYPOTHYROIDISM: ICD-10-CM

## 2021-05-19 RX ORDER — THYROID 60 MG
TABLET ORAL
Qty: 90 TABLET | Refills: 1 | Status: SHIPPED | OUTPATIENT
Start: 2021-05-19 | End: 2021-11-11

## 2021-05-19 RX ORDER — THYROID,PORK 15 MG
TABLET ORAL
Qty: 90 TABLET | Refills: 1 | Status: SHIPPED | OUTPATIENT
Start: 2021-05-19 | End: 2021-11-10

## 2021-05-21 ENCOUNTER — HOSPITAL ENCOUNTER (OUTPATIENT)
Dept: CT IMAGING | Age: 81
Discharge: HOME | End: 2021-05-21
Payer: MEDICARE

## 2021-05-21 ENCOUNTER — HOSPITAL ENCOUNTER (OUTPATIENT)
Dept: CT IMAGING | Age: 81
Discharge: HOME | End: 2021-05-21

## 2021-05-21 ENCOUNTER — HOSPITAL ENCOUNTER (OUTPATIENT)
Age: 81
Discharge: HOME | End: 2021-05-21
Payer: MEDICARE

## 2021-05-21 DIAGNOSIS — R10.32 ABDOMINAL PAIN, LEFT LOWER QUADRANT: ICD-10-CM

## 2021-05-21 LAB
BASOPHILS ABSOLUTE: 0 K/UL (ref 0–0.2)
BASOPHILS RELATIVE PERCENT: 0.4 %
EOSINOPHILS ABSOLUTE: 0.2 K/UL (ref 0–0.6)
EOSINOPHILS RELATIVE PERCENT: 3.5 %
HCT VFR BLD CALC: 42.3 % (ref 36–48)
HEMOGLOBIN: 14.2 G/DL (ref 12–16)
LYMPHOCYTES ABSOLUTE: 1.7 K/UL (ref 1–5.1)
LYMPHOCYTES RELATIVE PERCENT: 33.9 %
MCH RBC QN AUTO: 30.3 PG (ref 26–34)
MCHC RBC AUTO-ENTMCNC: 33.6 G/DL (ref 31–36)
MCV RBC AUTO: 90.2 FL (ref 80–100)
MONOCYTES ABSOLUTE: 0.9 K/UL (ref 0–1.3)
MONOCYTES RELATIVE PERCENT: 18 %
NEUTROPHILS ABSOLUTE: 2.2 K/UL (ref 1.7–7.7)
NEUTROPHILS RELATIVE PERCENT: 44.2 %
PDW BLD-RTO: 13.9 % (ref 12.4–15.4)
PLATELET # BLD: 167 K/UL (ref 135–450)
PMV BLD AUTO: 9.7 FL (ref 5–10.5)
RBC # BLD: 4.68 M/UL (ref 4–5.2)
WBC # BLD: 5 K/UL (ref 4–11)

## 2021-05-21 PROCEDURE — 74176 CT ABD & PELVIS W/O CONTRAST: CPT

## 2021-05-21 PROCEDURE — 36415 COLL VENOUS BLD VENIPUNCTURE: CPT

## 2021-05-21 PROCEDURE — 85025 COMPLETE CBC W/AUTO DIFF WBC: CPT

## 2021-06-01 ENCOUNTER — HOSPITAL ENCOUNTER (OUTPATIENT)
Age: 81
Discharge: HOME | End: 2021-06-01
Payer: MEDICARE

## 2021-06-01 DIAGNOSIS — E21.3 HYPERPARATHYROIDISM (HCC): ICD-10-CM

## 2021-06-01 DIAGNOSIS — E55.9 VITAMIN D DEFICIENCY: ICD-10-CM

## 2021-06-01 DIAGNOSIS — E03.9 ACQUIRED HYPOTHYROIDISM: ICD-10-CM

## 2021-06-01 DIAGNOSIS — M81.0 AGE-RELATED OSTEOPOROSIS WITHOUT CURRENT PATHOLOGICAL FRACTURE: ICD-10-CM

## 2021-06-01 LAB
A/G RATIO: 1.2 (ref 1.1–2.2)
ALBUMIN SERPL-MCNC: 3.8 G/DL (ref 3.4–5)
ALP BLD-CCNC: 82 U/L (ref 40–129)
ALT SERPL-CCNC: 15 U/L (ref 10–40)
ANION GAP SERPL CALCULATED.3IONS-SCNC: 10 MMOL/L (ref 3–16)
AST SERPL-CCNC: 19 U/L (ref 15–37)
BILIRUB SERPL-MCNC: 0.4 MG/DL (ref 0–1)
BUN BLDV-MCNC: 12 MG/DL (ref 7–20)
CALCIUM SERPL-MCNC: 9.9 MG/DL (ref 8.3–10.6)
CHLORIDE BLD-SCNC: 105 MMOL/L (ref 99–110)
CO2: 23 MMOL/L (ref 21–32)
CREAT SERPL-MCNC: 0.9 MG/DL (ref 0.6–1.2)
GFR AFRICAN AMERICAN: >60
GFR NON-AFRICAN AMERICAN: >60
GLOBULIN: 3.3 G/DL
GLUCOSE BLD-MCNC: 85 MG/DL (ref 70–99)
PARATHYROID HORMONE INTACT: 50.2 PG/ML (ref 14–72)
PHOSPHORUS: 2.7 MG/DL (ref 2.5–4.9)
POTASSIUM SERPL-SCNC: 4.9 MMOL/L (ref 3.5–5.1)
SODIUM BLD-SCNC: 138 MMOL/L (ref 136–145)
T3 TOTAL: 1.58 NG/ML (ref 0.8–2)
TOTAL PROTEIN: 7.1 G/DL (ref 6.4–8.2)
TSH SERPL DL<=0.05 MIU/L-ACNC: 1.13 UIU/ML (ref 0.27–4.2)
VITAMIN D 25-HYDROXY: 64.9 NG/ML

## 2021-06-01 PROCEDURE — 82652 VIT D 1 25-DIHYDROXY: CPT

## 2021-06-01 PROCEDURE — 80053 COMPREHEN METABOLIC PANEL: CPT

## 2021-06-01 PROCEDURE — 84480 ASSAY TRIIODOTHYRONINE (T3): CPT

## 2021-06-01 PROCEDURE — 84100 ASSAY OF PHOSPHORUS: CPT

## 2021-06-01 PROCEDURE — 36415 COLL VENOUS BLD VENIPUNCTURE: CPT

## 2021-06-01 PROCEDURE — 83970 ASSAY OF PARATHORMONE: CPT

## 2021-06-01 PROCEDURE — 82306 VITAMIN D 25 HYDROXY: CPT

## 2021-06-01 PROCEDURE — 84443 ASSAY THYROID STIM HORMONE: CPT

## 2021-06-03 ENCOUNTER — HOSPITAL ENCOUNTER (OUTPATIENT)
Age: 81
Discharge: HOME | End: 2021-06-03
Payer: MEDICARE

## 2021-06-03 ENCOUNTER — VIRTUAL VISIT (OUTPATIENT)
Dept: ENDOCRINOLOGY | Age: 81
End: 2021-06-03
Payer: MEDICARE

## 2021-06-03 DIAGNOSIS — E03.9 ACQUIRED HYPOTHYROIDISM: Primary | ICD-10-CM

## 2021-06-03 DIAGNOSIS — E55.9 VITAMIN D DEFICIENCY: ICD-10-CM

## 2021-06-03 DIAGNOSIS — E21.3 HYPERPARATHYROIDISM (HCC): ICD-10-CM

## 2021-06-03 DIAGNOSIS — M81.0 AGE-RELATED OSTEOPOROSIS WITHOUT CURRENT PATHOLOGICAL FRACTURE: ICD-10-CM

## 2021-06-03 LAB
24HR URINE VOLUME (ML): 2000 ML
CALCIUM 24 HOUR URINE: 106 MG/24 HR (ref 42–353)
CREATININE 24 HOUR URINE: 0.7 G/24HR (ref 0.6–1.5)
SODIUM 24 HOUR URINE: 124 MMOL/24 HR (ref 40–220)
VITAMIN D 1,25-DIHYDROXY: 56.1 PG/ML (ref 19.9–79.3)

## 2021-06-03 PROCEDURE — G8399 PT W/DXA RESULTS DOCUMENT: HCPCS | Performed by: INTERNAL MEDICINE

## 2021-06-03 PROCEDURE — 82340 ASSAY OF CALCIUM IN URINE: CPT

## 2021-06-03 PROCEDURE — G8427 DOCREV CUR MEDS BY ELIG CLIN: HCPCS | Performed by: INTERNAL MEDICINE

## 2021-06-03 PROCEDURE — 1090F PRES/ABSN URINE INCON ASSESS: CPT | Performed by: INTERNAL MEDICINE

## 2021-06-03 PROCEDURE — 4040F PNEUMOC VAC/ADMIN/RCVD: CPT | Performed by: INTERNAL MEDICINE

## 2021-06-03 PROCEDURE — 99213 OFFICE O/P EST LOW 20 MIN: CPT | Performed by: INTERNAL MEDICINE

## 2021-06-03 PROCEDURE — 84300 ASSAY OF URINE SODIUM: CPT

## 2021-06-03 PROCEDURE — 1123F ACP DISCUSS/DSCN MKR DOCD: CPT | Performed by: INTERNAL MEDICINE
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
## 2021-07-21 ENCOUNTER — HOSPITAL ENCOUNTER (OUTPATIENT)
Dept: WOMENS IMAGING | Age: 81
Discharge: HOME | End: 2021-07-21
Payer: MEDICARE

## 2021-07-21 DIAGNOSIS — Z12.31 BREAST CANCER SCREENING BY MAMMOGRAM: ICD-10-CM

## 2021-07-21 PROCEDURE — 77063 BREAST TOMOSYNTHESIS BI: CPT

## 2021-08-27 ENCOUNTER — TELEPHONE (OUTPATIENT)
Dept: ENDOCRINOLOGY | Age: 81
End: 2021-08-27

## 2021-09-01 ENCOUNTER — HOSPITAL ENCOUNTER (OUTPATIENT)
Age: 81
Discharge: HOME | End: 2021-09-01
Payer: MEDICARE

## 2021-09-01 DIAGNOSIS — E03.9 ACQUIRED HYPOTHYROIDISM: ICD-10-CM

## 2021-09-01 LAB
A/G RATIO: 1.1 (ref 1.1–2.2)
ALBUMIN SERPL-MCNC: 3.9 G/DL (ref 3.4–5)
ALP BLD-CCNC: 100 U/L (ref 40–129)
ALT SERPL-CCNC: 14 U/L (ref 10–40)
ANION GAP SERPL CALCULATED.3IONS-SCNC: 12 MMOL/L (ref 3–16)
AST SERPL-CCNC: 18 U/L (ref 15–37)
BILIRUB SERPL-MCNC: 0.3 MG/DL (ref 0–1)
BUN BLDV-MCNC: 20 MG/DL (ref 7–20)
CALCIUM SERPL-MCNC: 10.1 MG/DL (ref 8.3–10.6)
CHLORIDE BLD-SCNC: 104 MMOL/L (ref 99–110)
CO2: 23 MMOL/L (ref 21–32)
CREAT SERPL-MCNC: 1 MG/DL (ref 0.6–1.2)
GFR AFRICAN AMERICAN: >60
GFR NON-AFRICAN AMERICAN: 53
GLOBULIN: 3.6 G/DL
GLUCOSE BLD-MCNC: 100 MG/DL (ref 70–99)
PARATHYROID HORMONE INTACT: 95.2 PG/ML (ref 14–72)
POTASSIUM SERPL-SCNC: 5 MMOL/L (ref 3.5–5.1)
SODIUM BLD-SCNC: 139 MMOL/L (ref 136–145)
T3 TOTAL: 1.47 NG/ML (ref 0.8–2)
TOTAL PROTEIN: 7.5 G/DL (ref 6.4–8.2)
TSH SERPL DL<=0.05 MIU/L-ACNC: 0.99 UIU/ML (ref 0.27–4.2)

## 2021-09-01 PROCEDURE — 80053 COMPREHEN METABOLIC PANEL: CPT

## 2021-09-01 PROCEDURE — 83970 ASSAY OF PARATHORMONE: CPT

## 2021-09-01 PROCEDURE — 82306 VITAMIN D 25 HYDROXY: CPT

## 2021-09-01 PROCEDURE — 84443 ASSAY THYROID STIM HORMONE: CPT

## 2021-09-01 PROCEDURE — 84480 ASSAY TRIIODOTHYRONINE (T3): CPT

## 2021-09-01 PROCEDURE — 36415 COLL VENOUS BLD VENIPUNCTURE: CPT

## 2021-09-02 LAB — VITAMIN D 25-HYDROXY: 56.6 NG/ML

## 2021-09-07 ENCOUNTER — OFFICE VISIT (OUTPATIENT)
Dept: ENDOCRINOLOGY | Age: 81
End: 2021-09-07
Payer: MEDICARE

## 2021-09-07 VITALS
DIASTOLIC BLOOD PRESSURE: 68 MMHG | WEIGHT: 178.2 LBS | HEART RATE: 72 BPM | SYSTOLIC BLOOD PRESSURE: 132 MMHG | BODY MASS INDEX: 32.59 KG/M2

## 2021-09-07 DIAGNOSIS — M81.0 AGE-RELATED OSTEOPOROSIS WITHOUT CURRENT PATHOLOGICAL FRACTURE: Primary | ICD-10-CM

## 2021-09-07 DIAGNOSIS — E04.1 THYROID NODULE: ICD-10-CM

## 2021-09-07 DIAGNOSIS — E55.9 VITAMIN D DEFICIENCY: ICD-10-CM

## 2021-09-07 PROCEDURE — G8427 DOCREV CUR MEDS BY ELIG CLIN: HCPCS | Performed by: INTERNAL MEDICINE

## 2021-09-07 PROCEDURE — 99214 OFFICE O/P EST MOD 30 MIN: CPT | Performed by: INTERNAL MEDICINE

## 2021-09-07 PROCEDURE — 1090F PRES/ABSN URINE INCON ASSESS: CPT | Performed by: INTERNAL MEDICINE

## 2021-09-07 PROCEDURE — 4040F PNEUMOC VAC/ADMIN/RCVD: CPT | Performed by: INTERNAL MEDICINE

## 2021-09-07 PROCEDURE — G8417 CALC BMI ABV UP PARAM F/U: HCPCS | Performed by: INTERNAL MEDICINE

## 2021-09-07 PROCEDURE — G8399 PT W/DXA RESULTS DOCUMENT: HCPCS | Performed by: INTERNAL MEDICINE

## 2021-09-07 PROCEDURE — 1036F TOBACCO NON-USER: CPT | Performed by: INTERNAL MEDICINE

## 2021-09-07 PROCEDURE — 1123F ACP DISCUSS/DSCN MKR DOCD: CPT | Performed by: INTERNAL MEDICINE
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
## 2021-09-14 PROCEDURE — 96372 THER/PROPH/DIAG INJ SC/IM: CPT | Performed by: INTERNAL MEDICINE

## 2021-09-16 ENCOUNTER — HOSPITAL ENCOUNTER (OUTPATIENT)
Dept: ULTRASOUND IMAGING | Age: 81
Discharge: HOME | End: 2021-09-16
Payer: MEDICARE

## 2021-09-16 DIAGNOSIS — E04.1 THYROID NODULE: ICD-10-CM

## 2021-09-16 PROCEDURE — 76536 US EXAM OF HEAD AND NECK: CPT

## 2021-10-15 ENCOUNTER — CLINICAL DOCUMENTATION (OUTPATIENT)
Dept: OTHER | Age: 81
End: 2021-10-15

## 2021-11-11 DIAGNOSIS — E03.9 ACQUIRED HYPOTHYROIDISM: ICD-10-CM

## 2021-11-11 RX ORDER — THYROID 60 MG
TABLET ORAL
Qty: 90 TABLET | Refills: 1 | Status: SHIPPED | OUTPATIENT
Start: 2021-11-11 | End: 2022-05-09

## 2021-11-15 ENCOUNTER — CLINICAL DOCUMENTATION (OUTPATIENT)
Dept: OTHER | Age: 81
End: 2021-11-15

## 2021-12-15 ENCOUNTER — HOSPITAL ENCOUNTER (OUTPATIENT)
Age: 81
Discharge: HOME | End: 2021-12-15
Payer: MEDICARE

## 2021-12-15 LAB
A/G RATIO: 1.3 (ref 1.1–2.2)
ALBUMIN SERPL-MCNC: 3.8 G/DL (ref 3.4–5)
ALP BLD-CCNC: 93 U/L (ref 40–129)
ALT SERPL-CCNC: 17 U/L (ref 10–40)
ANION GAP SERPL CALCULATED.3IONS-SCNC: 11 MMOL/L (ref 3–16)
AST SERPL-CCNC: 20 U/L (ref 15–37)
BASOPHILS ABSOLUTE: 0 K/UL (ref 0–0.2)
BASOPHILS RELATIVE PERCENT: 0.5 %
BILIRUB SERPL-MCNC: 0.3 MG/DL (ref 0–1)
BILIRUBIN DIRECT: <0.2 MG/DL (ref 0–0.3)
BILIRUBIN, INDIRECT: NORMAL MG/DL (ref 0–1)
BUN BLDV-MCNC: 16 MG/DL (ref 7–20)
CALCIUM SERPL-MCNC: 9.2 MG/DL (ref 8.3–10.6)
CHLORIDE BLD-SCNC: 105 MMOL/L (ref 99–110)
CHOLESTEROL, TOTAL: 203 MG/DL (ref 0–199)
CO2: 22 MMOL/L (ref 21–32)
CREAT SERPL-MCNC: 0.8 MG/DL (ref 0.6–1.2)
EOSINOPHILS ABSOLUTE: 0.2 K/UL (ref 0–0.6)
EOSINOPHILS RELATIVE PERCENT: 4.4 %
GFR AFRICAN AMERICAN: >60
GFR NON-AFRICAN AMERICAN: >60
GLUCOSE BLD-MCNC: 86 MG/DL (ref 70–99)
HCT VFR BLD CALC: 42.1 % (ref 36–48)
HDLC SERPL-MCNC: 108 MG/DL (ref 40–60)
HEMOGLOBIN: 14 G/DL (ref 12–16)
LDL CHOLESTEROL CALCULATED: 76 MG/DL
LYMPHOCYTES ABSOLUTE: 1.4 K/UL (ref 1–5.1)
LYMPHOCYTES RELATIVE PERCENT: 26.2 %
MCH RBC QN AUTO: 30.1 PG (ref 26–34)
MCHC RBC AUTO-ENTMCNC: 33.3 G/DL (ref 31–36)
MCV RBC AUTO: 90.3 FL (ref 80–100)
MONOCYTES ABSOLUTE: 0.7 K/UL (ref 0–1.3)
MONOCYTES RELATIVE PERCENT: 13.3 %
NEUTROPHILS ABSOLUTE: 3 K/UL (ref 1.7–7.7)
NEUTROPHILS RELATIVE PERCENT: 55.6 %
PDW BLD-RTO: 14.9 % (ref 12.4–15.4)
PLATELET # BLD: 197 K/UL (ref 135–450)
PMV BLD AUTO: 9.4 FL (ref 5–10.5)
POTASSIUM SERPL-SCNC: 4.9 MMOL/L (ref 3.5–5.1)
RBC # BLD: 4.66 M/UL (ref 4–5.2)
SEDIMENTATION RATE, ERYTHROCYTE: 26 MM/HR (ref 0–30)
SODIUM BLD-SCNC: 138 MMOL/L (ref 136–145)
T4 FREE: 0.9 NG/DL (ref 0.9–1.8)
TOTAL PROTEIN: 6.8 G/DL (ref 6.4–8.2)
TRIGL SERPL-MCNC: 94 MG/DL (ref 0–150)
TSH SERPL DL<=0.05 MIU/L-ACNC: 2.56 UIU/ML (ref 0.27–4.2)
VLDLC SERPL CALC-MCNC: 19 MG/DL
WBC # BLD: 5.4 K/UL (ref 4–11)

## 2021-12-15 PROCEDURE — 80053 COMPREHEN METABOLIC PANEL: CPT

## 2021-12-15 PROCEDURE — 85652 RBC SED RATE AUTOMATED: CPT

## 2021-12-15 PROCEDURE — 36415 COLL VENOUS BLD VENIPUNCTURE: CPT

## 2021-12-15 PROCEDURE — 80061 LIPID PANEL: CPT

## 2021-12-15 PROCEDURE — 84439 ASSAY OF FREE THYROXINE: CPT

## 2021-12-15 PROCEDURE — 82248 BILIRUBIN DIRECT: CPT

## 2021-12-15 PROCEDURE — 85025 COMPLETE CBC W/AUTO DIFF WBC: CPT

## 2021-12-15 PROCEDURE — 84443 ASSAY THYROID STIM HORMONE: CPT

## 2022-01-04 ENCOUNTER — HOSPITAL ENCOUNTER (OUTPATIENT)
Dept: ULTRASOUND IMAGING | Age: 82
Discharge: HOME | End: 2022-01-04
Payer: MEDICARE

## 2022-01-04 ENCOUNTER — HOSPITAL ENCOUNTER (OUTPATIENT)
Age: 82
Discharge: HOME | End: 2022-01-04
Payer: MEDICARE

## 2022-01-04 DIAGNOSIS — R10.11 ABDOMINAL PAIN, RIGHT UPPER QUADRANT: ICD-10-CM

## 2022-01-04 LAB
AMYLASE: 69 U/L (ref 25–115)
LIPASE: 32 U/L (ref 13–60)

## 2022-01-04 PROCEDURE — 36415 COLL VENOUS BLD VENIPUNCTURE: CPT

## 2022-01-04 PROCEDURE — 76705 ECHO EXAM OF ABDOMEN: CPT

## 2022-01-04 PROCEDURE — 82150 ASSAY OF AMYLASE: CPT

## 2022-01-04 PROCEDURE — 83690 ASSAY OF LIPASE: CPT

## 2022-01-19 ENCOUNTER — HOSPITAL ENCOUNTER (OUTPATIENT)
Age: 82
Discharge: HOME | End: 2022-01-19
Payer: MEDICARE

## 2022-01-19 DIAGNOSIS — N18.31 STAGE 3A CHRONIC KIDNEY DISEASE (HCC): ICD-10-CM

## 2022-01-19 LAB
ANION GAP SERPL CALCULATED.3IONS-SCNC: 13 MMOL/L (ref 3–16)
BUN BLDV-MCNC: 12 MG/DL (ref 7–20)
CALCIUM SERPL-MCNC: 9.6 MG/DL (ref 8.3–10.6)
CHLORIDE BLD-SCNC: 103 MMOL/L (ref 99–110)
CO2: 22 MMOL/L (ref 21–32)
CREAT SERPL-MCNC: 0.8 MG/DL (ref 0.6–1.2)
GFR AFRICAN AMERICAN: >60
GFR NON-AFRICAN AMERICAN: >60
GLUCOSE BLD-MCNC: 97 MG/DL (ref 70–99)
POTASSIUM SERPL-SCNC: 5 MMOL/L (ref 3.5–5.1)
SODIUM BLD-SCNC: 138 MMOL/L (ref 136–145)

## 2022-01-19 PROCEDURE — 80048 BASIC METABOLIC PNL TOTAL CA: CPT

## 2022-01-19 PROCEDURE — 36415 COLL VENOUS BLD VENIPUNCTURE: CPT

## 2022-01-26 ENCOUNTER — HOSPITAL ENCOUNTER (OUTPATIENT)
Age: 82
Discharge: HOME | End: 2022-01-26
Payer: MEDICARE

## 2022-01-26 PROCEDURE — U0003 INFECTIOUS AGENT DETECTION BY NUCLEIC ACID (DNA OR RNA); SEVERE ACUTE RESPIRATORY SYNDROME CORONAVIRUS 2 (SARS-COV-2) (CORONAVIRUS DISEASE [COVID-19]), AMPLIFIED PROBE TECHNIQUE, MAKING USE OF HIGH THROUGHPUT TECHNOLOGIES AS DESCRIBED BY CMS-2020-01-R: HCPCS

## 2022-01-26 PROCEDURE — U0005 INFEC AGEN DETEC AMPLI PROBE: HCPCS

## 2022-01-27 LAB — SARS-COV-2: NOT DETECTED

## 2022-01-31 ENCOUNTER — ANESTHESIA (OUTPATIENT)
Dept: ENDOSCOPY | Age: 82
End: 2022-01-31
Payer: MEDICARE

## 2022-01-31 ENCOUNTER — HOSPITAL ENCOUNTER (OUTPATIENT)
Age: 82
Setting detail: OUTPATIENT SURGERY
Discharge: HOME HEALTH | End: 2022-01-31
Attending: INTERNAL MEDICINE | Admitting: INTERNAL MEDICINE
Payer: MEDICARE

## 2022-01-31 ENCOUNTER — ANESTHESIA EVENT (OUTPATIENT)
Dept: ENDOSCOPY | Age: 82
End: 2022-01-31
Payer: MEDICARE

## 2022-01-31 VITALS
HEART RATE: 57 BPM | OXYGEN SATURATION: 95 % | TEMPERATURE: 96.5 F | RESPIRATION RATE: 18 BRPM | WEIGHT: 187 LBS | BODY MASS INDEX: 34.41 KG/M2 | DIASTOLIC BLOOD PRESSURE: 64 MMHG | SYSTOLIC BLOOD PRESSURE: 132 MMHG | HEIGHT: 62 IN

## 2022-01-31 VITALS
OXYGEN SATURATION: 96 % | RESPIRATION RATE: 12 BRPM | DIASTOLIC BLOOD PRESSURE: 70 MMHG | SYSTOLIC BLOOD PRESSURE: 150 MMHG

## 2022-01-31 DIAGNOSIS — Z01.818 PREOP TESTING: Primary | ICD-10-CM

## 2022-01-31 PROCEDURE — 3609012400 HC EGD TRANSORAL BIOPSY SINGLE/MULTIPLE: Performed by: INTERNAL MEDICINE

## 2022-01-31 PROCEDURE — 88305 TISSUE EXAM BY PATHOLOGIST: CPT

## 2022-01-31 PROCEDURE — 7100000010 HC PHASE II RECOVERY - FIRST 15 MIN: Performed by: INTERNAL MEDICINE

## 2022-01-31 PROCEDURE — 2580000003 HC RX 258: Performed by: ANESTHESIOLOGY

## 2022-01-31 PROCEDURE — 6360000002 HC RX W HCPCS: Performed by: NURSE ANESTHETIST, CERTIFIED REGISTERED

## 2022-01-31 PROCEDURE — 3609017700 HC EGD DILATION GASTRIC/DUODENAL STRICTURE: Performed by: INTERNAL MEDICINE

## 2022-01-31 PROCEDURE — 2709999900 HC NON-CHARGEABLE SUPPLY: Performed by: INTERNAL MEDICINE

## 2022-01-31 PROCEDURE — 2500000003 HC RX 250 WO HCPCS: Performed by: NURSE ANESTHETIST, CERTIFIED REGISTERED

## 2022-01-31 PROCEDURE — 7100000011 HC PHASE II RECOVERY - ADDTL 15 MIN: Performed by: INTERNAL MEDICINE

## 2022-01-31 PROCEDURE — C1726 CATH, BAL DIL, NON-VASCULAR: HCPCS | Performed by: INTERNAL MEDICINE

## 2022-01-31 PROCEDURE — 3700000000 HC ANESTHESIA ATTENDED CARE: Performed by: INTERNAL MEDICINE

## 2022-01-31 RX ORDER — LIDOCAINE HYDROCHLORIDE 20 MG/ML
INJECTION, SOLUTION EPIDURAL; INFILTRATION; INTRACAUDAL; PERINEURAL PRN
Status: DISCONTINUED | OUTPATIENT
Start: 2022-01-31 | End: 2022-01-31 | Stop reason: SDUPTHER

## 2022-01-31 RX ORDER — PROPOFOL 10 MG/ML
INJECTION, EMULSION INTRAVENOUS CONTINUOUS PRN
Status: DISCONTINUED | OUTPATIENT
Start: 2022-01-31 | End: 2022-01-31 | Stop reason: SDUPTHER

## 2022-01-31 RX ORDER — FAMOTIDINE 20 MG/1
20 TABLET, FILM COATED ORAL EVERY EVENING
COMMUNITY
End: 2022-09-21 | Stop reason: SDUPTHER

## 2022-01-31 RX ORDER — SODIUM CHLORIDE 9 MG/ML
INJECTION, SOLUTION INTRAVENOUS CONTINUOUS
Status: DISCONTINUED | OUTPATIENT
Start: 2022-01-31 | End: 2022-01-31 | Stop reason: HOSPADM

## 2022-01-31 RX ORDER — PROPOFOL 10 MG/ML
INJECTION, EMULSION INTRAVENOUS PRN
Status: DISCONTINUED | OUTPATIENT
Start: 2022-01-31 | End: 2022-01-31 | Stop reason: SDUPTHER

## 2022-01-31 RX ADMIN — PROPOFOL 50 MG: 10 INJECTION, EMULSION INTRAVENOUS at 09:25

## 2022-01-31 RX ADMIN — LIDOCAINE HYDROCHLORIDE 50 MG: 20 INJECTION, SOLUTION EPIDURAL; INFILTRATION; INTRACAUDAL; PERINEURAL at 09:24

## 2022-01-31 RX ADMIN — PROPOFOL 50 MG: 10 INJECTION, EMULSION INTRAVENOUS at 09:24

## 2022-01-31 RX ADMIN — SODIUM CHLORIDE: 9 INJECTION, SOLUTION INTRAVENOUS at 08:44

## 2022-01-31 RX ADMIN — PROPOFOL 130 MCG/KG/MIN: 10 INJECTION, EMULSION INTRAVENOUS at 09:24

## 2022-01-31 ASSESSMENT — PAIN - FUNCTIONAL ASSESSMENT: PAIN_FUNCTIONAL_ASSESSMENT: 0-10
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
## 2022-01-31 NOTE — ANESTHESIA POSTPROCEDURE EVALUATION
Department of Anesthesiology  Postprocedure Note    Patient: Bryce Mohan  MRN: [id number]  YOB: 1940  Date of evaluation: 1/31/2022  Time:  10:06 AM     Procedure Summary     Date: 01/31/22 Room / Location: [address] [place] [address]    Anesthesia Start: 3812 Anesthesia Stop: 8652    Procedures:       EGD BIOPSY (N/A Abdomen)      EGD DILATION BALLOON (N/A Abdomen) Diagnosis: (ABDOMINAL PAIN R10.9)    Surgeons: Stefan Briscoe MD Responsible Provider: Kalen Arroyo MD    Anesthesia Type: MAC ASA Status: 3          Anesthesia Type: MAC    Beti Phase I: Beti Score: 10    Beti Phase II: Beti Score: 10    Last vitals: Reviewed and per EMR flowsheets.        Anesthesia Post Evaluation    Patient location during evaluation: PACU  Patient participation: complete - patient participated  Level of consciousness: awake and awake and alert  Pain score: 2  Airway patency: patent  Nausea & Vomiting: no vomiting  Complications: no  Cardiovascular status: blood pressure returned to baseline  Respiratory status: acceptable  Hydration status: euvolemic  Multimodal analgesia pain management approach

## 2022-03-01 ENCOUNTER — HOSPITAL ENCOUNTER (OUTPATIENT)
Age: 82
Discharge: HOME | End: 2022-03-01
Payer: MEDICARE

## 2022-03-01 DIAGNOSIS — E04.1 THYROID NODULE: ICD-10-CM

## 2022-03-01 DIAGNOSIS — E55.9 VITAMIN D DEFICIENCY: ICD-10-CM

## 2022-03-01 LAB
A/G RATIO: 1.5 (ref 1.1–2.2)
ALBUMIN SERPL-MCNC: 4.1 G/DL (ref 3.4–5)
ALP BLD-CCNC: 91 U/L (ref 40–129)
ALT SERPL-CCNC: 18 U/L (ref 10–40)
ANION GAP SERPL CALCULATED.3IONS-SCNC: 13 MMOL/L (ref 3–16)
AST SERPL-CCNC: 24 U/L (ref 15–37)
BILIRUB SERPL-MCNC: 0.4 MG/DL (ref 0–1)
BUN BLDV-MCNC: 17 MG/DL (ref 7–20)
CALCIUM SERPL-MCNC: 10.4 MG/DL (ref 8.3–10.6)
CHLORIDE BLD-SCNC: 102 MMOL/L (ref 99–110)
CO2: 24 MMOL/L (ref 21–32)
CREAT SERPL-MCNC: 0.8 MG/DL (ref 0.6–1.2)
GFR AFRICAN AMERICAN: >60
GFR NON-AFRICAN AMERICAN: >60
GLUCOSE BLD-MCNC: 87 MG/DL (ref 70–99)
PARATHYROID HORMONE INTACT: 61 PG/ML (ref 14–72)
POTASSIUM SERPL-SCNC: 5.6 MMOL/L (ref 3.5–5.1)
SODIUM BLD-SCNC: 139 MMOL/L (ref 136–145)
T3 TOTAL: 1.54 NG/ML (ref 0.8–2)
TOTAL PROTEIN: 6.9 G/DL (ref 6.4–8.2)
TSH SERPL DL<=0.05 MIU/L-ACNC: 2.73 UIU/ML (ref 0.27–4.2)
VITAMIN D 25-HYDROXY: 50.2 NG/ML

## 2022-03-01 PROCEDURE — 84480 ASSAY TRIIODOTHYRONINE (T3): CPT

## 2022-03-01 PROCEDURE — 36415 COLL VENOUS BLD VENIPUNCTURE: CPT

## 2022-03-01 PROCEDURE — 80053 COMPREHEN METABOLIC PANEL: CPT

## 2022-03-01 PROCEDURE — 83970 ASSAY OF PARATHORMONE: CPT

## 2022-03-01 PROCEDURE — 84443 ASSAY THYROID STIM HORMONE: CPT

## 2022-03-01 PROCEDURE — 82306 VITAMIN D 25 HYDROXY: CPT

## 2022-03-03 ENCOUNTER — HOSPITAL ENCOUNTER (OUTPATIENT)
Age: 82
Discharge: HOME | End: 2022-03-03
Payer: MEDICARE

## 2022-03-03 LAB
24HR URINE VOLUME (ML): 1625 ML
CALCIUM 24 HOUR URINE: 55 MG/24 HR (ref 42–353)
CREATININE 24 HOUR URINE: 0.7 G/24HR (ref 0.6–1.5)
SODIUM 24 HOUR URINE: 119 MMOL/24 HR (ref 40–220)

## 2022-03-03 PROCEDURE — 82340 ASSAY OF CALCIUM IN URINE: CPT

## 2022-03-03 PROCEDURE — 84300 ASSAY OF URINE SODIUM: CPT

## 2022-03-08 ENCOUNTER — TELEPHONE (OUTPATIENT)
Dept: ENDOCRINOLOGY | Age: 82
End: 2022-03-08

## 2022-03-08 ENCOUNTER — OFFICE VISIT (OUTPATIENT)
Dept: ENDOCRINOLOGY | Age: 82
End: 2022-03-08
Payer: MEDICARE

## 2022-03-08 ENCOUNTER — NURSE ONLY (OUTPATIENT)
Dept: ENDOCRINOLOGY | Age: 82
End: 2022-03-08
Payer: MEDICARE

## 2022-03-08 VITALS
DIASTOLIC BLOOD PRESSURE: 73 MMHG | HEART RATE: 78 BPM | SYSTOLIC BLOOD PRESSURE: 101 MMHG | BODY MASS INDEX: 35.33 KG/M2 | HEIGHT: 62 IN | RESPIRATION RATE: 16 BRPM | WEIGHT: 192 LBS

## 2022-03-08 DIAGNOSIS — M81.0 SENILE OSTEOPOROSIS: ICD-10-CM

## 2022-03-08 DIAGNOSIS — E03.9 ACQUIRED HYPOTHYROIDISM: ICD-10-CM

## 2022-03-08 DIAGNOSIS — M81.0 AGE-RELATED OSTEOPOROSIS WITHOUT CURRENT PATHOLOGICAL FRACTURE: Primary | ICD-10-CM

## 2022-03-08 PROCEDURE — G8417 CALC BMI ABV UP PARAM F/U: HCPCS | Performed by: INTERNAL MEDICINE

## 2022-03-08 PROCEDURE — 99214 OFFICE O/P EST MOD 30 MIN: CPT | Performed by: INTERNAL MEDICINE

## 2022-03-08 PROCEDURE — G8484 FLU IMMUNIZE NO ADMIN: HCPCS | Performed by: INTERNAL MEDICINE

## 2022-03-08 PROCEDURE — 96372 THER/PROPH/DIAG INJ SC/IM: CPT | Performed by: INTERNAL MEDICINE

## 2022-03-08 PROCEDURE — 1090F PRES/ABSN URINE INCON ASSESS: CPT | Performed by: INTERNAL MEDICINE

## 2022-03-08 PROCEDURE — G8427 DOCREV CUR MEDS BY ELIG CLIN: HCPCS | Performed by: INTERNAL MEDICINE

## 2022-03-08 PROCEDURE — 1123F ACP DISCUSS/DSCN MKR DOCD: CPT | Performed by: INTERNAL MEDICINE

## 2022-03-08 PROCEDURE — G8399 PT W/DXA RESULTS DOCUMENT: HCPCS | Performed by: INTERNAL MEDICINE

## 2022-03-08 PROCEDURE — 1036F TOBACCO NON-USER: CPT | Performed by: INTERNAL MEDICINE

## 2022-03-08 PROCEDURE — 4040F PNEUMOC VAC/ADMIN/RCVD: CPT | Performed by: INTERNAL MEDICINE
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
## 2022-03-08 NOTE — PROGRESS NOTES
Patient presents for a prolia injection. Pt denies any prior adverse reactions. Prolia 60 mg given SubQ in the left arm with no complications. Patient tolerated well. Patient to return to office in 6 months for next injection.      Lot: [id number]  Exp: 7/24

## 2022-03-21 ENCOUNTER — OFFICE VISIT (OUTPATIENT)
Dept: INTERNAL MEDICINE CLINIC | Age: 82
End: 2022-03-21
Payer: MEDICARE

## 2022-03-21 VITALS
WEIGHT: 192.8 LBS | HEART RATE: 68 BPM | DIASTOLIC BLOOD PRESSURE: 78 MMHG | SYSTOLIC BLOOD PRESSURE: 124 MMHG | HEIGHT: 62 IN | BODY MASS INDEX: 35.48 KG/M2

## 2022-03-21 DIAGNOSIS — M81.0 AGE-RELATED OSTEOPOROSIS WITHOUT CURRENT PATHOLOGICAL FRACTURE: ICD-10-CM

## 2022-03-21 DIAGNOSIS — E03.9 ACQUIRED HYPOTHYROIDISM: Primary | ICD-10-CM

## 2022-03-21 DIAGNOSIS — I82.409 RECURRENT DEEP VEIN THROMBOSIS (DVT) (HCC): ICD-10-CM

## 2022-03-21 DIAGNOSIS — M15.9 PRIMARY OSTEOARTHRITIS INVOLVING MULTIPLE JOINTS: ICD-10-CM

## 2022-03-21 DIAGNOSIS — Z79.01 CHRONIC ANTICOAGULATION: ICD-10-CM

## 2022-03-21 DIAGNOSIS — I10 ESSENTIAL HYPERTENSION: ICD-10-CM

## 2022-03-21 PROCEDURE — G8399 PT W/DXA RESULTS DOCUMENT: HCPCS | Performed by: INTERNAL MEDICINE

## 2022-03-21 PROCEDURE — 99204 OFFICE O/P NEW MOD 45 MIN: CPT | Performed by: INTERNAL MEDICINE

## 2022-03-21 PROCEDURE — G8427 DOCREV CUR MEDS BY ELIG CLIN: HCPCS | Performed by: INTERNAL MEDICINE

## 2022-03-21 PROCEDURE — G8482 FLU IMMUNIZE ORDER/ADMIN: HCPCS | Performed by: INTERNAL MEDICINE

## 2022-03-21 PROCEDURE — 1090F PRES/ABSN URINE INCON ASSESS: CPT | Performed by: INTERNAL MEDICINE

## 2022-03-21 PROCEDURE — G8417 CALC BMI ABV UP PARAM F/U: HCPCS | Performed by: INTERNAL MEDICINE

## 2022-03-21 PROCEDURE — 4040F PNEUMOC VAC/ADMIN/RCVD: CPT | Performed by: INTERNAL MEDICINE

## 2022-03-21 PROCEDURE — 1036F TOBACCO NON-USER: CPT | Performed by: INTERNAL MEDICINE

## 2022-03-21 PROCEDURE — 1123F ACP DISCUSS/DSCN MKR DOCD: CPT | Performed by: INTERNAL MEDICINE

## 2022-03-21 SDOH — HEALTH STABILITY: PHYSICAL HEALTH: ON AVERAGE, HOW MANY DAYS PER WEEK DO YOU ENGAGE IN MODERATE TO STRENUOUS EXERCISE (LIKE A BRISK WALK)?: 3 DAYS

## 2022-03-21 SDOH — HEALTH STABILITY: PHYSICAL HEALTH: ON AVERAGE, HOW MANY MINUTES DO YOU ENGAGE IN EXERCISE AT THIS LEVEL?: 20 MIN

## 2022-03-21 SDOH — ECONOMIC STABILITY: FOOD INSECURITY: WITHIN THE PAST 12 MONTHS, YOU WORRIED THAT YOUR FOOD WOULD RUN OUT BEFORE YOU GOT MONEY TO BUY MORE.: NEVER TRUE

## 2022-03-21 SDOH — ECONOMIC STABILITY: FOOD INSECURITY: WITHIN THE PAST 12 MONTHS, THE FOOD YOU BOUGHT JUST DIDN'T LAST AND YOU DIDN'T HAVE MONEY TO GET MORE.: NEVER TRUE

## 2022-03-21 ASSESSMENT — PATIENT HEALTH QUESTIONNAIRE - PHQ9
SUM OF ALL RESPONSES TO PHQ QUESTIONS 1-9: 1
SUM OF ALL RESPONSES TO PHQ9 QUESTIONS 1 & 2: 1
2. FEELING DOWN, DEPRESSED OR HOPELESS: 1
SUM OF ALL RESPONSES TO PHQ QUESTIONS 1-9: 1
1. LITTLE INTEREST OR PLEASURE IN DOING THINGS: 0
SUM OF ALL RESPONSES TO PHQ QUESTIONS 1-9: 1
SUM OF ALL RESPONSES TO PHQ QUESTIONS 1-9: 1

## 2022-03-21 ASSESSMENT — ENCOUNTER SYMPTOMS
COLOR CHANGE: 0
NAUSEA: 0
ABDOMINAL PAIN: 0
COUGH: 0
CHEST TIGHTNESS: 0
CONSTIPATION: 0
VOMITING: 0
BACK PAIN: 0
SORE THROAT: 0
WHEEZING: 0
SHORTNESS OF BREATH: 0

## 2022-03-21 ASSESSMENT — SOCIAL DETERMINANTS OF HEALTH (SDOH)
WITHIN THE LAST YEAR, HAVE TO BEEN RAPED OR FORCED TO HAVE ANY KIND OF SEXUAL ACTIVITY BY YOUR PARTNER OR EX-PARTNER?: PATIENT DECLINED
WITHIN THE LAST YEAR, HAVE YOU BEEN AFRAID OF YOUR PARTNER OR EX-PARTNER?: NO
WITHIN THE LAST YEAR, HAVE YOU BEEN HUMILIATED OR EMOTIONALLY ABUSED IN OTHER WAYS BY YOUR PARTNER OR EX-PARTNER?: PATIENT DECLINED
HOW HARD IS IT FOR YOU TO PAY FOR THE VERY BASICS LIKE FOOD, HOUSING, MEDICAL CARE, AND HEATING?: NOT HARD AT ALL
WITHIN THE LAST YEAR, HAVE YOU BEEN KICKED, HIT, SLAPPED, OR OTHERWISE PHYSICALLY HURT BY YOUR PARTNER OR EX-PARTNER?: PATIENT DECLINED

## 2022-03-21 NOTE — ASSESSMENT & PLAN NOTE
Symptoms affecting multiple joints including back and lower extremity, currently doing okay with low-dose gabapentin at bedtime, will continue the same and reevaluate as needed

## 2022-03-21 NOTE — ASSESSMENT & PLAN NOTE
Stable on current dose of 10 mg of Xarelto, history of recurrent DVT bilateral lower extremities, had heme-onc work-up in the past with no clear identifiable etiology.   Continue the same, no problems with bleeding side effects

## 2022-03-21 NOTE — ASSESSMENT & PLAN NOTE
Blood pressure appears to be stable and well-controlled on current dose of metoprolol, she will continue the same under care of cardiology whom she has been following up with as well for chronic allergic insufficiency along with stable chronic CHF and nonischemic cardiomyopathy.   Reinforced recommendations for maintaining healthy low-salt diet and regular level of physical activity along with attempting weight loss

## 2022-03-21 NOTE — ASSESSMENT & PLAN NOTE
Stable on current dose of [place] Thyroid, had failure of treatment with levothyroxine, continue same for now and continue care and recommendations as per endocrinology

## 2022-03-21 NOTE — PROGRESS NOTES
ASSESSMENT/PLAN:  1. Acquired hypothyroidism  Assessment & Plan:   Stable on current dose of [place] Thyroid, had failure of treatment with levothyroxine, continue same for now and continue care and recommendations as per endocrinology  2. Age-related osteoporosis without current pathological fracture  Assessment & Plan:   Has been maintained on Prolia injections, she does take calcium and vitamin D supplements, discussed with patient recommendations for daily weightbearing activity as walking, follow-up with endocrinology as scheduled. Discussed with patient recommendations for fall proofing living environment which she with prior history of falls and fractures  3. Chronic anticoagulation  Assessment & Plan:  Stable on current dose of 10 mg of Xarelto, history of recurrent DVT bilateral lower extremities, had heme-onc work-up in the past with no clear identifiable etiology. Continue the same, no problems with bleeding side effects  4. Essential hypertension  Assessment & Plan:   Blood pressure appears to be stable and well-controlled on current dose of metoprolol, she will continue the same under care of cardiology whom she has been following up with as well for chronic allergic insufficiency along with stable chronic CHF and nonischemic cardiomyopathy. Reinforced recommendations for maintaining healthy low-salt diet and regular level of physical activity along with attempting weight loss  5. Primary osteoarthritis involving multiple joints  Assessment & Plan:   Symptoms affecting multiple joints including back and lower extremity, currently doing okay with low-dose gabapentin at bedtime, will continue the same and reevaluate as needed  6. Recurrent deep vein thrombosis (DVT) (HCC)  -     rivaroxaban (XARELTO) 10 MG TABS tablet; Take 1 tablet by mouth daily (with breakfast), Disp-30 tablet, R-0NO PRINT      Return in about 6 months (around 9/21/2022) for AWV.      SUBJECTIVE  HPI:   Patient here to establish new PCP office visits due to current PCP retiring. She is an 81-year-old female with stable multiple chronic medical problems mostly significant for osteoporosis with underactive thyroid for which she follows up with endocrinology and has been maintained on Prolia injections and [place] Thyroid, she also has history of nonischemic cardiomyopathy, aortic insufficiency and hypertension for which she has been following up with cardiology at the [facility].  She lives by herself, she has 2 sons in the [place] area, she is fairly independent in her ADLs, continues to drive without any limitation  She has multijoint arthritic problems but continues to be functional with fairly well-controlled pain      Review of Systems   Constitutional: Negative for activity change, appetite change, fatigue and unexpected weight change. HENT: Negative for congestion, hearing loss, mouth sores and sore throat. Eyes: Negative for visual disturbance. Respiratory: Negative for cough, chest tightness, shortness of breath and wheezing. Cardiovascular: Positive for leg swelling. Negative for chest pain and palpitations. Gastrointestinal: Negative for abdominal pain, constipation, nausea and vomiting. Endocrine: Negative for cold intolerance and heat intolerance. Genitourinary: Negative for difficulty urinating, dysuria, frequency, hematuria and urgency. Musculoskeletal: Positive for arthralgias. Negative for back pain, gait problem and joint swelling. Skin: Negative for color change. Allergic/Immunologic: Negative for environmental allergies and immunocompromised state. Neurological: Negative for dizziness, weakness, light-headedness and headaches. Hematological: Bruises/bleeds easily. Psychiatric/Behavioral: Negative for behavioral problems, dysphoric mood and sleep disturbance. The patient is not nervous/anxious.         OBJECTIVE:    /78   Pulse 68   Ht 5' 2\" (1.575 m)   Wt 192 lb 12.8 oz (87.5 kg) BMI 35.26 kg/m²    Physical Exam  Constitutional:       General: She is not in acute distress. Appearance: Normal appearance. She is obese. She is not toxic-appearing. HENT:      Head: Normocephalic. Eyes:      Conjunctiva/sclera: Conjunctivae normal.   Cardiovascular:      Rate and Rhythm: Normal rate and regular rhythm. Heart sounds: Normal heart sounds. Pulmonary:      Effort: Pulmonary effort is normal. No respiratory distress. Breath sounds: Normal breath sounds. No wheezing. Abdominal:      Palpations: Abdomen is soft. Musculoskeletal:         General: Tenderness present. No swelling. Normal range of motion. Cervical back: Neck supple. No tenderness. Right lower leg: No edema. Left lower leg: Edema present. Skin:     General: Skin is warm and dry. Neurological:      General: No focal deficit present. Mental Status: She is alert. Cranial Nerves: No cranial nerve deficit. Psychiatric:         Mood and Affect: Mood normal.         Thought Content: Thought content normal.           Electronically signed by Stefania Mckeon MD on 3/21/2022 at 12:00 PM.    This dictation was generated by voice recognition computer software. Although all attempts are made to edit the dictation for accuracy, there may be errors in the transcription that are not intended.

## 2022-05-04 DIAGNOSIS — E03.9 ACQUIRED HYPOTHYROIDISM: ICD-10-CM

## 2022-05-04 RX ORDER — THYROID,PORK 15 MG
TABLET ORAL
Qty: 90 TABLET | Refills: 1 | Status: SHIPPED | OUTPATIENT
Start: 2022-05-04

## 2022-05-07 DIAGNOSIS — E03.9 ACQUIRED HYPOTHYROIDISM: ICD-10-CM

## 2022-05-09 RX ORDER — THYROID 60 MG
TABLET ORAL
Qty: 90 TABLET | Refills: 1 | Status: SHIPPED | OUTPATIENT
Start: 2022-05-09

## 2022-06-16 ENCOUNTER — HOSPITAL ENCOUNTER (OUTPATIENT)
Dept: GENERAL RADIOLOGY | Age: 82
Discharge: HOME | End: 2022-06-16
Payer: MEDICARE

## 2022-06-16 DIAGNOSIS — M81.0 AGE-RELATED OSTEOPOROSIS WITHOUT CURRENT PATHOLOGICAL FRACTURE: ICD-10-CM

## 2022-06-16 PROCEDURE — 77080 DXA BONE DENSITY AXIAL: CPT

## 2022-07-19 ENCOUNTER — HOSPITAL ENCOUNTER (OUTPATIENT)
Age: 82
Discharge: HOME | End: 2022-07-19
Payer: MEDICARE

## 2022-07-19 LAB
ANION GAP SERPL CALCULATED.3IONS-SCNC: 9 MMOL/L (ref 3–16)
BUN BLDV-MCNC: 14 MG/DL (ref 7–20)
CALCIUM SERPL-MCNC: 9.2 MG/DL (ref 8.3–10.6)
CHLORIDE BLD-SCNC: 105 MMOL/L (ref 99–110)
CO2: 24 MMOL/L (ref 21–32)
CREAT SERPL-MCNC: 1 MG/DL (ref 0.6–1.2)
GFR AFRICAN AMERICAN: >60
GFR NON-AFRICAN AMERICAN: 53
GLUCOSE BLD-MCNC: 90 MG/DL (ref 70–99)
POTASSIUM SERPL-SCNC: 5.2 MMOL/L (ref 3.5–5.1)
SODIUM BLD-SCNC: 138 MMOL/L (ref 136–145)

## 2022-07-19 PROCEDURE — 36415 COLL VENOUS BLD VENIPUNCTURE: CPT

## 2022-07-19 PROCEDURE — 80048 BASIC METABOLIC PNL TOTAL CA: CPT

## 2022-08-15 ENCOUNTER — TELEPHONE (OUTPATIENT)
Dept: ENDOCRINOLOGY | Age: 82
End: 2022-08-15

## 2022-08-15 ENCOUNTER — OFFICE VISIT (OUTPATIENT)
Dept: INTERNAL MEDICINE CLINIC | Age: 82
End: 2022-08-15
Payer: MEDICARE

## 2022-08-15 VITALS
OXYGEN SATURATION: 97 % | WEIGHT: 193.8 LBS | HEIGHT: 62 IN | BODY MASS INDEX: 35.66 KG/M2 | HEART RATE: 80 BPM | SYSTOLIC BLOOD PRESSURE: 125 MMHG | DIASTOLIC BLOOD PRESSURE: 78 MMHG

## 2022-08-15 DIAGNOSIS — G47.33 OSA ON CPAP: ICD-10-CM

## 2022-08-15 DIAGNOSIS — I10 ESSENTIAL HYPERTENSION: ICD-10-CM

## 2022-08-15 DIAGNOSIS — R06.09 DYSPNEA ON EXERTION: Primary | ICD-10-CM

## 2022-08-15 DIAGNOSIS — Z99.89 OSA ON CPAP: ICD-10-CM

## 2022-08-15 DIAGNOSIS — I35.0 MODERATE AORTIC STENOSIS: ICD-10-CM

## 2022-08-15 DIAGNOSIS — I50.42 CHRONIC COMBINED SYSTOLIC AND DIASTOLIC HEART FAILURE (HCC): ICD-10-CM

## 2022-08-15 PROCEDURE — 1123F ACP DISCUSS/DSCN MKR DOCD: CPT | Performed by: INTERNAL MEDICINE

## 2022-08-15 PROCEDURE — G8399 PT W/DXA RESULTS DOCUMENT: HCPCS | Performed by: INTERNAL MEDICINE

## 2022-08-15 PROCEDURE — 1090F PRES/ABSN URINE INCON ASSESS: CPT | Performed by: INTERNAL MEDICINE

## 2022-08-15 PROCEDURE — G8417 CALC BMI ABV UP PARAM F/U: HCPCS | Performed by: INTERNAL MEDICINE

## 2022-08-15 PROCEDURE — 1036F TOBACCO NON-USER: CPT | Performed by: INTERNAL MEDICINE

## 2022-08-15 PROCEDURE — G8427 DOCREV CUR MEDS BY ELIG CLIN: HCPCS | Performed by: INTERNAL MEDICINE

## 2022-08-15 PROCEDURE — 99214 OFFICE O/P EST MOD 30 MIN: CPT | Performed by: INTERNAL MEDICINE

## 2022-08-15 ASSESSMENT — ENCOUNTER SYMPTOMS
NAUSEA: 0
ABDOMINAL PAIN: 0
SORE THROAT: 0
VOMITING: 0
BACK PAIN: 0
WHEEZING: 0
SHORTNESS OF BREATH: 1
CHEST TIGHTNESS: 0
CONSTIPATION: 0
COUGH: 0
COLOR CHANGE: 0

## 2022-08-15 NOTE — TELEPHONE ENCOUNTER
Prior authorization is not required for the selected combination of member, drug, and health plan.  Reference number: [id number]

## 2022-08-15 NOTE — ASSESSMENT & PLAN NOTE
As stated above patient reporting frequent episodes of apnea per night, advised to follow-up with sleep medicine as settings may need to be adjusted

## 2022-08-15 NOTE — ASSESSMENT & PLAN NOTE
Pressure stable and well-controlled on current medications continue same along with maintaining low-salt diet and regular physical activity

## 2022-08-15 NOTE — ASSESSMENT & PLAN NOTE
Although this is not a new diagnosis and patient has baseline dyspnea on exertion she is reporting significant change since cardiology visit in April, she has been more sedentary lately which may be contributing to her symptoms, explained to patient this can be multifactorial given past history of cardiomyopathy with chronic heart failure, aortic stenosis, obstructive sleep apnea and arrhythmias. No evidence of fluid overload on today's exam, advised she will need to have updated echocardiogram since last study was 2 years ago to follow-up on both cardiomyopathy and aortic stenosis, patient will reach out to her cardiologist, also recommended she follows up with sleep medicine as she is reporting frequent apnea episodes as recorded on her machine and may need to have her settings adjusted.   Lastly recommended gradually up level of physical activity as tolerated a sedentary lifestyle will worsen deconditioning

## 2022-08-15 NOTE — ASSESSMENT & PLAN NOTE
As mentioned above, no evidence of fluid overload on today's exam, patient remains on furosemide and beta-blocker therapy, she will reach out to her cardiologist, she does occasionally take an extra furosemide for excessive swelling.   Sinew to maintain low-salt diet and increase level of physical activity as tolerated

## 2022-08-15 NOTE — PROGRESS NOTES
ASSESSMENT/PLAN:  1. Dyspnea on exertion  Assessment & Plan:  Although this is not a new diagnosis and patient has baseline dyspnea on exertion she is reporting significant change since cardiology visit in April, she has been more sedentary lately which may be contributing to her symptoms, explained to patient this can be multifactorial given past history of cardiomyopathy with chronic heart failure, aortic stenosis, obstructive sleep apnea and arrhythmias. No evidence of fluid overload on today's exam, advised she will need to have updated echocardiogram since last study was 2 years ago to follow-up on both cardiomyopathy and aortic stenosis, patient will reach out to her cardiologist, also recommended she follows up with sleep medicine as she is reporting frequent apnea episodes as recorded on her machine and may need to have her settings adjusted. Lastly recommended gradually up level of physical activity as tolerated a sedentary lifestyle will worsen deconditioning   2. Chronic combined systolic and diastolic heart failure ([address]  Assessment & Plan:   As mentioned above, no evidence of fluid overload on today's exam, patient remains on furosemide and beta-blocker therapy, she will reach out to her cardiologist, she does occasionally take an extra furosemide for excessive swelling. Sinew to maintain low-salt diet and increase level of physical activity as tolerated  3. Essential hypertension  Assessment & Plan:   Pressure stable and well-controlled on current medications continue same along with maintaining low-salt diet and regular physical activity  4. Moderate aortic stenosis  5. ALEXI on CPAP  Assessment & Plan:   As stated above patient reporting frequent episodes of apnea per night, advised to follow-up with sleep medicine as settings may need to be adjusted      Return for as scheduled.      SUBJECTIVE  HPI:   Patient here today complaining of worsening shortness of breath, states she feels very fatigued with lack of energy which is unlike her, states she has been feeling like that for the past 3 months or so. She is denying any chest pain or tightness however states if she walks short distance she will have to stop because she will be gasping for air. Denies dizziness or lightheadedness, denies lower extremity swelling      Review of Systems   Constitutional:  Negative for activity change, appetite change and fatigue. HENT:  Negative for congestion, hearing loss, mouth sores and sore throat. Eyes:  Negative for visual disturbance. Respiratory:  Positive for shortness of breath. Negative for cough, chest tightness and wheezing. Cardiovascular:  Negative for chest pain, palpitations and leg swelling. Gastrointestinal:  Negative for abdominal pain, constipation, nausea and vomiting. Endocrine: Negative for cold intolerance and heat intolerance. Genitourinary:  Negative for difficulty urinating, dysuria, frequency, hematuria and urgency. Musculoskeletal:  Negative for arthralgias, back pain, gait problem and joint swelling. Skin:  Negative for color change. Allergic/Immunologic: Negative for environmental allergies and immunocompromised state. Neurological:  Negative for dizziness, speech difficulty, light-headedness and headaches. Psychiatric/Behavioral:  Negative for behavioral problems and dysphoric mood. OBJECTIVE:    /78   Pulse 80   Ht 5' 2\" (1.575 m)   Wt 193 lb 12.8 oz (87.9 kg)   SpO2 97%   BMI 35.45 kg/m²    Physical Exam  Constitutional:       General: She is not in acute distress. Appearance: Normal appearance. She is normal weight. She is not toxic-appearing. HENT:      Head: Normocephalic. Eyes:      Conjunctiva/sclera: Conjunctivae normal.   Neck:      Vascular: No carotid bruit. Cardiovascular:      Rate and Rhythm: Normal rate and regular rhythm. Heart sounds: Murmur heard.    Pulmonary:      Effort: Pulmonary effort is normal. No respiratory distress. Breath sounds: No wheezing. Abdominal:      Palpations: Abdomen is soft. Musculoskeletal:         General: No tenderness. Normal range of motion. Cervical back: Neck supple. Right lower leg: No edema. Left lower leg: No edema. Skin:     General: Skin is warm and dry. Neurological:      General: No focal deficit present. Mental Status: She is alert. Cranial Nerves: No cranial nerve deficit. Psychiatric:         Mood and Affect: Mood normal.         Electronically signed by Evan Benjamin MD on 8/15/2022 at 1:28 PM.    This dictation was generated by voice recognition computer software. Although all attempts are made to edit the dictation for accuracy, there may be errors in the transcription that are not intended.

## 2022-08-23 ENCOUNTER — HOSPITAL ENCOUNTER (OUTPATIENT)
Dept: ULTRASOUND IMAGING | Age: 82
Discharge: HOME | End: 2022-08-23
Payer: MEDICARE

## 2022-08-23 DIAGNOSIS — Z85.528 H/O RENAL CELL CARCINOMA: ICD-10-CM

## 2022-08-23 PROCEDURE — 76770 US EXAM ABDO BACK WALL COMP: CPT

## 2022-09-06 ENCOUNTER — PATIENT MESSAGE (OUTPATIENT)
Dept: ENDOCRINOLOGY | Age: 82
End: 2022-09-06

## 2022-09-08 ENCOUNTER — HOSPITAL ENCOUNTER (OUTPATIENT)
Age: 82
Discharge: HOME | End: 2022-09-08
Payer: MEDICARE

## 2022-09-08 DIAGNOSIS — M81.0 AGE-RELATED OSTEOPOROSIS WITHOUT CURRENT PATHOLOGICAL FRACTURE: ICD-10-CM

## 2022-09-08 DIAGNOSIS — E03.9 ACQUIRED HYPOTHYROIDISM: ICD-10-CM

## 2022-09-08 LAB
A/G RATIO: 1.2 (ref 1.1–2.2)
ALBUMIN SERPL-MCNC: 3.7 G/DL (ref 3.4–5)
ALP BLD-CCNC: 106 U/L (ref 40–129)
ALT SERPL-CCNC: 14 U/L (ref 10–40)
ANION GAP SERPL CALCULATED.3IONS-SCNC: 9 MMOL/L (ref 3–16)
AST SERPL-CCNC: 16 U/L (ref 15–37)
BILIRUB SERPL-MCNC: 0.3 MG/DL (ref 0–1)
BUN BLDV-MCNC: 11 MG/DL (ref 7–20)
CALCIUM SERPL-MCNC: 9.4 MG/DL (ref 8.3–10.6)
CHLORIDE BLD-SCNC: 102 MMOL/L (ref 99–110)
CO2: 24 MMOL/L (ref 21–32)
CREAT SERPL-MCNC: 1 MG/DL (ref 0.6–1.2)
GFR AFRICAN AMERICAN: >60
GFR NON-AFRICAN AMERICAN: 53
GLUCOSE BLD-MCNC: 95 MG/DL (ref 70–99)
PHOSPHORUS: 2.6 MG/DL (ref 2.5–4.9)
POTASSIUM SERPL-SCNC: 4.5 MMOL/L (ref 3.5–5.1)
SODIUM BLD-SCNC: 135 MMOL/L (ref 136–145)
T3 TOTAL: 1.36 NG/ML (ref 0.8–2)
TOTAL PROTEIN: 6.8 G/DL (ref 6.4–8.2)
TSH SERPL DL<=0.05 MIU/L-ACNC: 1.94 UIU/ML (ref 0.27–4.2)
VITAMIN D 25-HYDROXY: 55.4 NG/ML

## 2022-09-08 PROCEDURE — 80053 COMPREHEN METABOLIC PANEL: CPT

## 2022-09-08 PROCEDURE — 84443 ASSAY THYROID STIM HORMONE: CPT

## 2022-09-08 PROCEDURE — 82306 VITAMIN D 25 HYDROXY: CPT

## 2022-09-08 PROCEDURE — 36415 COLL VENOUS BLD VENIPUNCTURE: CPT

## 2022-09-08 PROCEDURE — 84100 ASSAY OF PHOSPHORUS: CPT

## 2022-09-08 PROCEDURE — 84480 ASSAY TRIIODOTHYRONINE (T3): CPT

## 2022-09-13 ENCOUNTER — NURSE ONLY (OUTPATIENT)
Dept: ENDOCRINOLOGY | Age: 82
End: 2022-09-13
Payer: MEDICARE

## 2022-09-13 ENCOUNTER — OFFICE VISIT (OUTPATIENT)
Dept: ENDOCRINOLOGY | Age: 82
End: 2022-09-13
Payer: MEDICARE

## 2022-09-13 VITALS
BODY MASS INDEX: 36.62 KG/M2 | HEART RATE: 73 BPM | SYSTOLIC BLOOD PRESSURE: 121 MMHG | RESPIRATION RATE: 16 BRPM | HEIGHT: 62 IN | DIASTOLIC BLOOD PRESSURE: 68 MMHG | WEIGHT: 199 LBS

## 2022-09-13 DIAGNOSIS — E03.9 ACQUIRED HYPOTHYROIDISM: Primary | ICD-10-CM

## 2022-09-13 DIAGNOSIS — E66.01 SEVERE OBESITY (BMI 35.0-39.9) WITH COMORBIDITY (HCC): ICD-10-CM

## 2022-09-13 DIAGNOSIS — E21.3 HYPERPARATHYROIDISM (HCC): ICD-10-CM

## 2022-09-13 DIAGNOSIS — M81.0 AGE-RELATED OSTEOPOROSIS WITHOUT CURRENT PATHOLOGICAL FRACTURE: Primary | ICD-10-CM

## 2022-09-13 DIAGNOSIS — M81.0 AGE-RELATED OSTEOPOROSIS WITHOUT CURRENT PATHOLOGICAL FRACTURE: ICD-10-CM

## 2022-09-13 PROCEDURE — G8417 CALC BMI ABV UP PARAM F/U: HCPCS | Performed by: INTERNAL MEDICINE

## 2022-09-13 PROCEDURE — G8399 PT W/DXA RESULTS DOCUMENT: HCPCS | Performed by: INTERNAL MEDICINE

## 2022-09-13 PROCEDURE — 99214 OFFICE O/P EST MOD 30 MIN: CPT | Performed by: INTERNAL MEDICINE

## 2022-09-13 PROCEDURE — G8427 DOCREV CUR MEDS BY ELIG CLIN: HCPCS | Performed by: INTERNAL MEDICINE

## 2022-09-13 PROCEDURE — 1123F ACP DISCUSS/DSCN MKR DOCD: CPT | Performed by: INTERNAL MEDICINE

## 2022-09-13 PROCEDURE — 96372 THER/PROPH/DIAG INJ SC/IM: CPT | Performed by: INTERNAL MEDICINE

## 2022-09-13 PROCEDURE — 1036F TOBACCO NON-USER: CPT | Performed by: INTERNAL MEDICINE

## 2022-09-13 PROCEDURE — 1090F PRES/ABSN URINE INCON ASSESS: CPT | Performed by: INTERNAL MEDICINE

## 2022-09-13 NOTE — PROGRESS NOTES
Patient presents for a prolia injection. Pt denies any prior adverse reactions. Prolia 60 mg given SubQ in the right arm with no complications. Patient tolerated well. Patient to return to office in 6 months for next injection.      Lot: [id number]  Exp: 10/24

## 2022-09-13 NOTE — PROGRESS NOTES
Caterina Lombardo is a 81 y.o. female seen for for hypothyroidism and osteoporosis. Patient was diagnosed with Hypothyroidism approximately May 1998 at the time of diagnosis she was having ringing in her ears and her workup showed a goiter which led to her diagnosis   Current complaints: denies fatigue, weight changes, heat/cold intolerance, bowel/skin changes or CVS symptoms. She recalls her thyroid fx test were slightly abnormal   History of obstructive symptoms: difficulty swallowing No, changes in voice/hoarseness No.    History of radiation to patient's neck: NO  Recent iodine exposure: No  Family history includes no thyroid abnormalities. Family history of thyroid cancer: No    She has Esophageal stricture and Hiatal Hernia and also has GERD and is on Zantac   She takes 100 mg of gabapentin at night for hip pain   She has been diagnosed with Osteopenia and takes calcium and Vitamin d   Her last DEXA was in 11/2017 with DR Elizabeth Adams and she is noted to have osteopenia   She had renal cell carcinoma and is s/p left nephrectomy she follows with Urologist   She has hx of DVT and is on Xarelto     Patient started taking 90 mg of [place] Thyroid on May 23, 2019. Previously she was on approximately 88 mcg of Nature-Throid. She started having palpitations and of July and was noted to have a TSH of 0.03. She had also joined weight watchers in June and had lost 13 pounds she has been taking her medication regularly. When she was having the episode of palpitation she was advised to reduce the dose to half a tablet of 90 mg she feels tired and fatigued again. She has lost 30  lbs from  June 2019 to jan 2020. Osteoporosis diagnosed in in aug 2020 and started on Prolia by PCP     INTERIM     --she has been working on her diet   She is taking Vit D 4000 IU daily   She denies any fractures she denies any kidney stones, we discussed optimizing calcium in her diet and not taking exogenous calcium supplement.       Past edema 2019    Chronic combined systolic and diastolic heart failure ([facility] [address] 2019    Idiopathic cardiomyopathy ([facility] [address] 2019    Prediabetes 2019    Primary osteoarthritis involving multiple joints 2018    Insomnia 2018    Age-related osteoporosis without current pathological fracture 2018    S/p nephrectomy 2018    History of recurrent deep vein thrombosis (DVT) 2018    Chronic anticoagulation 2018    Acquired hypothyroidism 2018    Essential hypertension 2016    ALEXI on CPAP 10/29/2015    Aortic valve insufficiency 10/29/2015    DDD (degenerative disc disease), lumbar 2014    Spinal stenosis, lumbar 2014    Adenomatous polyp of colon 2012    GERD (gastroesophageal reflux disease) 2011    Fatigue 2011    Female stress incontinence 2010    H/O renal cell carcinoma 2009     Past Surgical History:   Procedure Laterality Date    APPENDECTOMY      BREAST BIOPSY       SECTION      x3    COLONOSCOPY N/A 2021    COLONOSCOPY DIAGNOSTIC performed by Zully Martinez MD at [address] [place] [address], COLON, DIAGNOSTIC      KIDNEY SURGERY Right 2016    cryoablation procedure    RIGHT COLECTOMY Right 2012    lap, reurrent sessile adenoma    SUBTOTAL COLECTOMY      TOTAL NEPHRECTOMY Left     CA    TUBAL LIGATION      UPPER GASTROINTESTINAL ENDOSCOPY N/A 2022    EGD BIOPSY performed by Katerine Barlow MD at [place] [address] ENDOSCOPY N/A 2022    EGD DILATION BALLOON performed by Katerine Barlow MD at [address]     Family History   Problem Relation Age of Onset    Heart Disease Mother     High Blood Pressure Mother     Heart Disease Father     Cancer Sister     High Blood Pressure Sister     No Known Problems Sister     Alcohol Abuse Brother     Diabetes Maternal Grandmother     Hearing Loss Paternal Grandfather     Cancer Maternal Aunt Hearing Loss Maternal Uncle      Social History     Socioeconomic History    Marital status:      Spouse name: None    Number of children: 2    Years of education: None    Highest education level: None   Occupational History    Occupation: retired 2017, 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prrofreading , consulting   Tobacco Use    Smoking status: Former     Packs/day: 2.00     Years: 6.00     Pack years: 12.00     Types: Cigarettes     Quit date: 1982     Years since quittin.4    Smokeless tobacco: Never    Tobacco comments:     2 packs only 6 years   Vaping Use    Vaping Use: Never used   Substance and Sexual Activity    Alcohol use: Not Currently     Comment: Average- a glass of wine on special occasions    Drug use: No    Sexual activity: Not Currently     Social Determinants of Health     Financial Resource Strain: Low Risk     Difficulty of Paying Living Expenses: Not hard at all   Food Insecurity: No Food Insecurity    Worried About Running Out of Food in the Last Year: Never true    Ran Out of Food in the Last Year: Never true   Physical Activity: Insufficiently Active    Days of Exercise per Week: 3 days    Minutes of Exercise per Session: 20 min   Intimate Partner Violence: Unknown    Fear of Current or Ex-Partner: No    Emotionally Abused: Patient refused    Physically Abused: Patient refused    Sexually Abused: Patient refused     Current Outpatient Medications   Medication Sig Dispense Refill    ARMOUR THYROID 60 MG tablet TAKE 1 TABLET BY MOUTH DAILY (WITH THE 15 MG DOSE FOR A TOTAL DOSE OF 75 MG). .NOT COVERED.  90 tablet 1    ARMOUR THYROID 15 MG tablet TAKE 1 TABLET BY MOUTH DAILY (WITH THE 60 MG DOSE FOR A TOTAL DOSE OF 75 MG) 90 tablet 1    rivaroxaban (XARELTO) 10 MG TABS tablet Take 1 tablet by mouth daily (with breakfast) 30 tablet 0    famotidine (PEPCID) 20 MG tablet Take 20 mg by mouth every evening      Probiotic Product (PROBIOTIC DAILY PO) Take by mouth daily      gabapentin (NEURONTIN) 100 MG capsule Take 1 capsule by mouth nightly for 90 days. 90 capsule 0    metoprolol succinate (TOPROL XL) 50 MG extended release tablet Take 50 mg by mouth every evening       MAGNESIUM PO Take 250 mg by mouth daily       clotrimazole-betamethasone (LOTRISONE) 1-0.05 % cream Apply topically as needed       hydrocortisone (ANUSOL-HC) 2.5 % rectal cream as needed       Cholecalciferol (VITAMIN D3) 1000 units CAPS 3,000 Units daily       loratadine (CLARITIN) 10 MG tablet Take 10 mg by mouth as needed      fluticasone (FLONASE) 50 MCG/ACT nasal spray 1 spray by Nasal route as needed        No current facility-administered medications for this visit. Allergies   Allergen Reactions    Bupropion Other (See Comments)     hallucinations    Fosamax [Alendronate] Other (See Comments)     Indigestion, abdominal pain     Weight 182  OBJECTIVE:   /68   Pulse 73   Resp 16   Ht 5' 2\" (1.575 m)   Wt 199 lb (90.3 kg)   BMI 36.40 kg/m²   Wt Readings from Last 3 Encounters:   09/13/22 199 lb (90.3 kg)   08/15/22 193 lb 12.8 oz (87.9 kg)   07/25/22 196 lb 3.2 oz (89 kg)     ROS  I have reviewed the review of system questionnaire filled by the patient .   Patient was advised to contact PCP for non endocrine signs and symptoms       Physical Exam:    Constitutional: no acute distress, well appearing, well nourished  Psychiatric: oriented to person, place and time, judgement, insight and normal, recent and remote memory and intact and mood, affect are normal  Skin: skin and subcutaneous tissue is normal without mass,   Head and Face: examination of head and face revealed no abnormalities  Eyes: no lid or conjunctival swelling, no erythema or discharge, pupils are normal,   Ears/Nose: external inspection of ears and nose revealed no abnormalities, hearing is grossly normal  Oropharynx/Mouth/Face: lips, tongue and gums are normal with no lesions, the voice quality was normal  Neck: neck is supple and symmetric, with midline trachea and no masses, thyroid is normal    Pulmonary: no increased work of breathing or signs of respiratory distress, lungs are clear to auscultation  Cardiovascular: normal heart rate and rhythm, normal S1 and S2,   Musculoskeletal: normal gait and station,   Neurological: normal coordination, normal general cortical function    Lab Review:  Lab Results   Component Value Date/Time    TSH 1.94 09/08/2022 02:00 PM    TSH 2.73 03/01/2022 11:45 AM    TSH 2.56 12/15/2021 10:25 AM     Lab Results   Component Value Date/Time    T4FREE 0.9 12/15/2021 10:25 AM   June 2022   Impression   Osteopenia by WHO criteria. Significant increase in bone mineral density   compared to the previous examination in 2020. ASSESSMENT/PLAN:    --- Acquired hypothyroidism thyroid antibodies were negative    She has been taking 75 mg armour thyroid daily since February 2021 TSH was 4.97 in feb 2021 >>1.13in June 2021 >> 0.99 sept 2021 >>tsh 1.94 [id number]  Denies any palpitation, it was again discussed with the patient that this is not the standard of care but she felt better on [place] Thyroid and would like to stay on it despite the higher cost of it. She was initially on Lt4 but felt better on T4/T3 combination. ---thyroid uls normal in sept 2021, no nodules identified. This was dicussed with patient that Natural thyroid hormone use is not recommended by the American Thy[employer] Association. Natural thyroid hormone preparations consist of desiccated thyroid tissue from animals such as cows, sheep and pigs. These contain both thyroxine (T4) and triiodothyronine (T3) in a T4/T3 ratio of 4:1, the natural ratio made in animal thyroid glands. Human thyroid glands, in contrast, make T4 and T3 in a ratio of 14:1. The liver and the kidneys contain a 5' deiodinase enzyme that converts T4 to T3 in appropriate and regulated amounts for the body's metabolic needs.  Therefore, natural thyroid hormone preparations from animals provide significantly more T3 than humans naturally make. Since T3 is quickly and completely absorbed in the small intestine, patients taking these natural preparations often have supraphysiological serum T3 levels for several hours after taking the medications. These high serum T3 levels are potentially dangerous, particularly in older individuals who may already have underlying heart disease and/or osteoporosis. For these reasons natural thyroid hormone use is discouraged by most experts in the field. Primary Hyperparathyroidism   Patient has reduced exogenous calcium tablets. She was previously taking Pepcid Plast which had calcium in it she has stopped taking that but continues to drink almond milk and yogurt on daily basis. Calcium 9.4 she has reduced the calcium supplement   --- PTH is now 95 in September 2021, with a vitamin D level of 56, calcium was 10.1  In June 2021 her 24-hour urine calcium was within normal limits at 106 patient is currently taking tablets of calcium daily in the form of Pepcid Complete and has been advised in the past to not take   She is taking vitamin D 4000 IUs daily  24 hr urine calcium 55 in march 2022---she takes pepcid daily     -- Age-related osteoporosis without current pathological fracture  DEXA scan in May 2020 showed the worst T score of -2.6 in the femoral neck and there was 6.3% decline in bone mineral density noted at the DEXA scan as compared to the 1 in 11/20/2017. Tolerated the Prolia injection without any side effects September 2022. She was doing given orders to get her DEXA scan done before her next visit with me.   She was advised to not stop taking the Prolia injection without taking any other bone modifying agent as there is significant worsening in the spine noted in that scenario  First  Prolia injection was August 27, 2020    -- Gastroesophageal reflux disease with esophagitis  She follows with GI   On pepcid    -- Aortic valve insufficiency, etiology of cardiac valve disease unspecified  Follows with Cardiology     --- H/O renal cell carcinoma  S/p left Nephrectomy     --- Prediabetes  She follows with Dr Remington Burgess and/or ordered clinical lab results Yes  Reviewed and/or ordered radiology tests Yes   Reviewed and/or ordered other diagnostic tests Yes  Made a decision to obtain old records Yes  Reviewed and summarized old records Yes      Contreras Fontaine was counseled regarding symptoms of current diagnosis, course and complications of disease if inadequately treated, side effects of medications, diagnosis, treatment options, and prognosis, risks, benefits, complications, and alternatives of treatment, labs, imaging and other studies and treatment targets and goals. She understands instructions and counseling. I spent  30 + minutes which includes reviewing patient chart , interpreting previous lab results  , discussing and providing counseling and coordinating care of patient's multiple health issues with  the patient. Return in about 6 months (around 3/13/2023). Please note that some or all of this report was generated using voice recognition software. Please notify me in case of any questions about the content of this document, as some errors in transcription may have occurred .

## 2022-09-20 SDOH — HEALTH STABILITY: PHYSICAL HEALTH: ON AVERAGE, HOW MANY DAYS PER WEEK DO YOU ENGAGE IN MODERATE TO STRENUOUS EXERCISE (LIKE A BRISK WALK)?: 0 DAYS

## 2022-09-20 SDOH — HEALTH STABILITY: PHYSICAL HEALTH: ON AVERAGE, HOW MANY MINUTES DO YOU ENGAGE IN EXERCISE AT THIS LEVEL?: 0 MIN

## 2022-09-20 ASSESSMENT — PATIENT HEALTH QUESTIONNAIRE - PHQ9
SUM OF ALL RESPONSES TO PHQ9 QUESTIONS 1 & 2: 0
SUM OF ALL RESPONSES TO PHQ QUESTIONS 1-9: 0
SUM OF ALL RESPONSES TO PHQ QUESTIONS 1-9: 0
2. FEELING DOWN, DEPRESSED OR HOPELESS: 0
1. LITTLE INTEREST OR PLEASURE IN DOING THINGS: 0
SUM OF ALL RESPONSES TO PHQ QUESTIONS 1-9: 0
SUM OF ALL RESPONSES TO PHQ QUESTIONS 1-9: 0

## 2022-09-20 ASSESSMENT — LIFESTYLE VARIABLES
HOW OFTEN DO YOU HAVE A DRINK CONTAINING ALCOHOL: NEVER
HOW MANY STANDARD DRINKS CONTAINING ALCOHOL DO YOU HAVE ON A TYPICAL DAY: 0
HOW MANY STANDARD DRINKS CONTAINING ALCOHOL DO YOU HAVE ON A TYPICAL DAY: PATIENT DOES NOT DRINK
HOW OFTEN DO YOU HAVE A DRINK CONTAINING ALCOHOL: 1

## 2022-09-21 ENCOUNTER — OFFICE VISIT (OUTPATIENT)
Dept: INTERNAL MEDICINE CLINIC | Age: 82
End: 2022-09-21
Payer: MEDICARE

## 2022-09-21 VITALS
WEIGHT: 195.2 LBS | DIASTOLIC BLOOD PRESSURE: 70 MMHG | BODY MASS INDEX: 35.92 KG/M2 | HEIGHT: 62 IN | SYSTOLIC BLOOD PRESSURE: 114 MMHG | HEART RATE: 86 BPM | OXYGEN SATURATION: 97 %

## 2022-09-21 DIAGNOSIS — Z00.00 ENCOUNTER FOR MEDICARE ANNUAL WELLNESS EXAM: Primary | ICD-10-CM

## 2022-09-21 DIAGNOSIS — B36.9 DERMATITIS FUNGAL: ICD-10-CM

## 2022-09-21 DIAGNOSIS — S32.050D CLOSED COMPRESSION FRACTURE OF L5 LUMBAR VERTEBRA, WITH ROUTINE HEALING, SUBSEQUENT ENCOUNTER: ICD-10-CM

## 2022-09-21 DIAGNOSIS — M15.9 PRIMARY OSTEOARTHRITIS INVOLVING MULTIPLE JOINTS: ICD-10-CM

## 2022-09-21 DIAGNOSIS — K21.9 GASTROESOPHAGEAL REFLUX DISEASE WITHOUT ESOPHAGITIS: ICD-10-CM

## 2022-09-21 PROCEDURE — G0439 PPPS, SUBSEQ VISIT: HCPCS | Performed by: INTERNAL MEDICINE

## 2022-09-21 PROCEDURE — 1123F ACP DISCUSS/DSCN MKR DOCD: CPT | Performed by: INTERNAL MEDICINE

## 2022-09-21 RX ORDER — FAMOTIDINE 20 MG/1
20 TABLET, FILM COATED ORAL EVERY EVENING
Qty: 60 TABLET | Refills: 2 | Status: SHIPPED | OUTPATIENT
Start: 2022-09-21

## 2022-09-21 RX ORDER — CLOTRIMAZOLE AND BETAMETHASONE DIPROPIONATE 10; .64 MG/G; MG/G
CREAM TOPICAL PRN
Qty: 45 G | Refills: 3 | Status: SHIPPED | OUTPATIENT
Start: 2022-09-21

## 2022-09-21 RX ORDER — GABAPENTIN 100 MG/1
100 CAPSULE ORAL NIGHTLY
Qty: 90 CAPSULE | Refills: 1 | Status: SHIPPED | OUTPATIENT
Start: 2022-09-21 | End: 2022-12-20

## 2022-09-21 ASSESSMENT — ENCOUNTER SYMPTOMS
NAUSEA: 0
SHORTNESS OF BREATH: 0
COUGH: 0
ABDOMINAL PAIN: 0
SORE THROAT: 0
COLOR CHANGE: 0
SINUS PRESSURE: 0
VOMITING: 0
WHEEZING: 0
CONSTIPATION: 0
CHEST TIGHTNESS: 0
BACK PAIN: 1
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
## 2022-09-27 ENCOUNTER — HOSPITAL ENCOUNTER (OUTPATIENT)
Dept: WOMENS IMAGING | Age: 82
Discharge: HOME | End: 2022-09-27
Payer: MEDICARE

## 2022-09-27 VITALS — HEIGHT: 61 IN | WEIGHT: 195 LBS | BODY MASS INDEX: 36.82 KG/M2

## 2022-09-27 DIAGNOSIS — Z12.31 BREAST CANCER SCREENING BY MAMMOGRAM: ICD-10-CM

## 2022-09-27 PROCEDURE — 77063 BREAST TOMOSYNTHESIS BI: CPT

## 2022-11-03 DIAGNOSIS — N18.31 STAGE 3A CHRONIC KIDNEY DISEASE (HCC): ICD-10-CM

## 2022-11-03 LAB
ANION GAP SERPL CALCULATED.3IONS-SCNC: 14 MMOL/L (ref 3–16)
BUN BLDV-MCNC: 11 MG/DL (ref 7–20)
CALCIUM SERPL-MCNC: 8.8 MG/DL (ref 8.3–10.6)
CHLORIDE BLD-SCNC: 106 MMOL/L (ref 99–110)
CO2: 21 MMOL/L (ref 21–32)
CREAT SERPL-MCNC: 0.8 MG/DL (ref 0.6–1.2)
GFR SERPL CREATININE-BSD FRML MDRD: >60 ML/MIN/{1.73_M2}
GLUCOSE BLD-MCNC: 82 MG/DL (ref 70–99)
POTASSIUM SERPL-SCNC: 5 MMOL/L (ref 3.5–5.1)
SODIUM BLD-SCNC: 141 MMOL/L (ref 136–145)

## 2022-11-06 DIAGNOSIS — E03.9 ACQUIRED HYPOTHYROIDISM: ICD-10-CM

## 2022-11-07 RX ORDER — THYROID,PORK 15 MG
TABLET ORAL
Qty: 90 TABLET | Refills: 1 | Status: SHIPPED | OUTPATIENT
Start: 2022-11-07

## 2022-11-07 RX ORDER — THYROID 60 MG
TABLET ORAL
Qty: 90 TABLET | Refills: 1 | Status: SHIPPED | OUTPATIENT
Start: 2022-11-07

## 2022-11-15 DIAGNOSIS — K21.9 GASTROESOPHAGEAL REFLUX DISEASE WITHOUT ESOPHAGITIS: ICD-10-CM

## 2022-11-15 RX ORDER — FAMOTIDINE 20 MG/1
TABLET, FILM COATED ORAL
Qty: 60 TABLET | Refills: 5 | Status: SHIPPED | OUTPATIENT
Start: 2022-11-15

## 2022-11-16 ENCOUNTER — NURSE ONLY (OUTPATIENT)
Dept: INTERNAL MEDICINE CLINIC | Age: 82
End: 2022-11-16
Payer: MEDICARE

## 2022-11-16 DIAGNOSIS — Z23 NEED FOR INFLUENZA VACCINATION: Primary | ICD-10-CM

## 2022-11-16 PROCEDURE — 90694 VACC AIIV4 NO PRSRV 0.5ML IM: CPT | Performed by: INTERNAL MEDICINE

## 2022-11-16 PROCEDURE — G0008 ADMIN INFLUENZA VIRUS VAC: HCPCS | Performed by: INTERNAL MEDICINE

## 2022-12-01 ENCOUNTER — TELEMEDICINE (OUTPATIENT)
Dept: INTERNAL MEDICINE CLINIC | Age: 82
End: 2022-12-01
Payer: MEDICARE

## 2022-12-01 ENCOUNTER — PATIENT MESSAGE (OUTPATIENT)
Dept: INTERNAL MEDICINE CLINIC | Age: 82
End: 2022-12-01

## 2022-12-01 DIAGNOSIS — U07.1 COVID: Primary | ICD-10-CM

## 2022-12-01 DIAGNOSIS — Z86.718 HISTORY OF RECURRENT DEEP VEIN THROMBOSIS (DVT): ICD-10-CM

## 2022-12-01 PROCEDURE — 1123F ACP DISCUSS/DSCN MKR DOCD: CPT | Performed by: INTERNAL MEDICINE

## 2022-12-01 PROCEDURE — G8417 CALC BMI ABV UP PARAM F/U: HCPCS | Performed by: INTERNAL MEDICINE

## 2022-12-01 PROCEDURE — G8484 FLU IMMUNIZE NO ADMIN: HCPCS | Performed by: INTERNAL MEDICINE

## 2022-12-01 PROCEDURE — 1036F TOBACCO NON-USER: CPT | Performed by: INTERNAL MEDICINE

## 2022-12-01 PROCEDURE — 1090F PRES/ABSN URINE INCON ASSESS: CPT | Performed by: INTERNAL MEDICINE

## 2022-12-01 PROCEDURE — 99213 OFFICE O/P EST LOW 20 MIN: CPT | Performed by: INTERNAL MEDICINE

## 2022-12-01 PROCEDURE — G8427 DOCREV CUR MEDS BY ELIG CLIN: HCPCS | Performed by: INTERNAL MEDICINE

## 2022-12-01 PROCEDURE — G8399 PT W/DXA RESULTS DOCUMENT: HCPCS | Performed by: INTERNAL MEDICINE

## 2022-12-01 RX ORDER — NIRMATRELVIR AND RITONAVIR 300-100 MG
KIT ORAL
Qty: 30 TABLET | Refills: 0 | Status: SHIPPED | OUTPATIENT
Start: 2022-12-01 | End: 2022-12-06

## 2022-12-01 RX ORDER — BENZONATATE 200 MG/1
200 CAPSULE ORAL 3 TIMES DAILY PRN
Qty: 30 CAPSULE | Refills: 0 | Status: SHIPPED | OUTPATIENT
Start: 2022-12-01 | End: 2022-12-08

## 2022-12-01 ASSESSMENT — ENCOUNTER SYMPTOMS
COUGH: 1
VOMITING: 0
WHEEZING: 0
SORE THROAT: 0

## 2022-12-01 NOTE — PROGRESS NOTES
Christopher Buckner (:  1940) is a Established patient, here for evaluation of the following:    Assessment & Plan   Below is the assessment and plan developed based on review of pertinent history, physical exam, labs, studies, and medications. 1. COVID  -     nirmatrelvir/ritonavir (PAXLOVID, 300/100,) 20 x 150 MG & 10 x 100MG TBPK; Take 3 tablets (two 150 mg nirmatrelvir and one 100 mg ritonavir tablets) by mouth every 12 hours for 5 days. , Disp-30 tablet, R-0Normal  -     benzonatate (TESSALON) 200 MG capsule; Take 1 capsule by mouth 3 times daily as needed for Cough, Disp-30 capsule, R-0Normal  2. History of recurrent deep vein thrombosis (DVT)    At this point patient does have a positive COVID test and she has the classic symptoms in addition to being within the range for being treated I did start the patient medication I discussed with the effect of the medication her anticoagulants and will get a hold it for 1 day meanwhile we will get a monitor for any warning's symptoms including shortness of breath high fever chest pain    Patient understand the importance of continuing to take her other medication including the Xarelto with the adjustment that is noted  Return if symptoms worsen or fail to improve, for As scheduled. Subjective   Fever   This is a new problem. The current episode started in the past 7 days. The problem has been gradually worsening. The maximum temperature noted was 101 to 101.9 F. Associated symptoms include congestion, coughing and muscle aches. Pertinent negatives include no ear pain, headaches, sleepiness, sore throat, urinary pain, vomiting or wheezing. Cough  This is a new problem. The current episode started in the past 7 days. Associated symptoms include a fever. Pertinent negatives include no ear pain, headaches, sore throat or wheezing. Positive For Covid-19  Associated symptoms include congestion, coughing and a fever.  Pertinent negatives include no headaches, sore throat or vomiting. Review of Systems   Constitutional:  Positive for fever. HENT:  Positive for congestion. Negative for ear pain and sore throat. Respiratory:  Positive for cough. Negative for wheezing. Gastrointestinal:  Negative for vomiting. Genitourinary:  Negative for dysuria. Neurological:  Negative for headaches.         Objective   Patient-Reported Vitals  No data recorded     Physical Exam  [INSTRUCTIONS:  \"[x]\" Indicates a positive item  \"[]\" Indicates a negative item  -- DELETE ALL ITEMS NOT EXAMINED]    Constitutional: [x] Appears well-developed and well-nourished [x] No apparent distress      [] Abnormal -     Mental status: [x] Alert and awake  [x] Oriented to person/place/time [x] Able to follow commands    [] Abnormal -     Eyes:   EOM    [x]  Normal    [] Abnormal -   Sclera  [x]  Normal    [] Abnormal -          Discharge [x]  None visible   [] Abnormal -     HENT: [x] Normocephalic, atraumatic  [] Abnormal -   [x] Mouth/Throat: Mucous membranes are moist    External Ears [x] Normal  [] Abnormal -    Neck: [x] No visualized mass [] Abnormal -     Pulmonary/Chest: [x] Respiratory effort normal   [x] No visualized signs of difficulty breathing or respiratory distress        [] Abnormal -      Musculoskeletal:   [x] Normal gait with no signs of ataxia         [x] Normal range of motion of neck        [] Abnormal -     Neurological:        [x] No Facial Asymmetry (Cranial nerve 7 motor function) (limited exam due to video visit)          [x] No gaze palsy        [] Abnormal -          Skin:        [x] No significant exanthematous lesions or discoloration noted on facial skin         [] Abnormal -            Psychiatric:       [x] Normal Affect [] Abnormal -        [x] No Hallucinations    Other pertinent observable physical exam findings:-         On this date 12/1/2022 I have spent 25 minutes reviewing previous notes, test results and face to face (virtual) with the patient discussing the diagnosis and importance of compliance with the treatment plan as well as documenting on the day of the visit. Bret Martinez, was evaluated through a synchronous (real-time) audio-video encounter. The patient (or guardian if applicable) is aware that this is a billable service, which includes applicable co-pays. This Virtual Visit was conducted with patient's (and/or legal guardian's) consent. The visit was conducted pursuant to the emergency declaration under the [address] [place] [address], [address] [place] [address] authority and the [employer] General Act. Patient identification was verified, and a caregiver was present when appropriate. The patient was located at Home: [id number] [place] [address]  [address] [facility] [address].    Provider was located at [facility] ([facility] Dept): [address] [place] [address],  [address].        --Thad Colón MD

## 2022-12-01 NOTE — TELEPHONE ENCOUNTER
From: Carloz Leader  To: Dr. Ezio Liu: 12/1/2022 8:48 AM EST  Subject: Follow-Up to yesterday's message    I'm disappointed that I wasn't called yesterday. My temp went up to 101.8 last night. This morning, it's 100.5. I just did a COVID test and tested positive. I have coughed and coughed all night. I know there's a prescription for Paxlovid that I can take if you have the time to prescribe it. I'm sure everyone is very busy right now.     Chavo Vang

## 2022-12-12 NOTE — TELEPHONE ENCOUNTER
Please advise patient there is no need to keep testing for COVID, it is not unusual for COVID test to remain positive for few weeks after onset of original test and symptoms, the negative test she had in between may be false negative or inaccurately collected sample, regardless whether she tests negative or positive it will not have any impact on treatment since its been over 2 weeks with the symptoms. I recommend prophylactic Z-Ramiro since she may have bacterial superinfection following viral illness . recommend to continue adequate hydration and use over-the-counter cold and allergy meds.   Z-Ramiro ordered to her pharmacy

## 2023-01-26 DIAGNOSIS — R09.81 SINUS CONGESTION: ICD-10-CM

## 2023-01-26 RX ORDER — MONTELUKAST SODIUM 10 MG/1
10 TABLET ORAL NIGHTLY
Qty: 90 TABLET | Refills: 0 | Status: SHIPPED | OUTPATIENT
Start: 2023-01-26 | End: 2023-03-06

## 2023-01-26 NOTE — TELEPHONE ENCOUNTER
Recent Visits  Date Type Provider Dept   09/21/22 Office Visit MD Olamide Barragan   08/15/22 Office Visit MD Olamide Barragan   03/21/22 Office Visit Nixon Aguilar MD [facility]   Showing recent visits within past 540 days with a meds authorizing provider and meeting all other requirements  Future Appointments  Date Type Provider Dept   03/21/23 Appointment Nixon Aguilar MD [facility]   Showing future appointments within next 150 days with a meds authorizing provider and meeting all other requirements     12/1/2022

## 2023-02-27 ENCOUNTER — TELEPHONE (OUTPATIENT)
Dept: ENDOCRINOLOGY | Age: 83
End: 2023-02-27

## 2023-02-27 NOTE — TELEPHONE ENCOUNTER
Prior authorization is not required because the member has claim history for the selected drug in the past 365 days.   Reference number: [id number]

## 2023-02-28 DIAGNOSIS — N18.2 CKD (CHRONIC KIDNEY DISEASE), STAGE II: ICD-10-CM

## 2023-02-28 DIAGNOSIS — Z85.528 H/O RENAL CELL CARCINOMA: ICD-10-CM

## 2023-02-28 DIAGNOSIS — Z90.5 S/P NEPHRECTOMY: ICD-10-CM

## 2023-02-28 DIAGNOSIS — I10 ESSENTIAL HYPERTENSION: ICD-10-CM

## 2023-03-01 LAB
ANION GAP SERPL CALCULATED.3IONS-SCNC: 12 MMOL/L (ref 3–16)
BUN BLDV-MCNC: 21 MG/DL (ref 7–20)
CALCIUM SERPL-MCNC: 9.3 MG/DL (ref 8.3–10.6)
CHLORIDE BLD-SCNC: 101 MMOL/L (ref 99–110)
CO2: 23 MMOL/L (ref 21–32)
CREAT SERPL-MCNC: 1 MG/DL (ref 0.6–1.2)
GFR SERPL CREATININE-BSD FRML MDRD: 56 ML/MIN/{1.73_M2}
GLUCOSE BLD-MCNC: 93 MG/DL (ref 70–99)
POTASSIUM SERPL-SCNC: 4.8 MMOL/L (ref 3.5–5.1)
SODIUM BLD-SCNC: 136 MMOL/L (ref 136–145)

## 2023-03-16 DIAGNOSIS — S32.050D CLOSED COMPRESSION FRACTURE OF L5 LUMBAR VERTEBRA, WITH ROUTINE HEALING, SUBSEQUENT ENCOUNTER: ICD-10-CM

## 2023-03-16 DIAGNOSIS — M15.9 PRIMARY OSTEOARTHRITIS INVOLVING MULTIPLE JOINTS: ICD-10-CM

## 2023-03-16 RX ORDER — GABAPENTIN 100 MG/1
100 CAPSULE ORAL NIGHTLY
Qty: 90 CAPSULE | Refills: 1 | Status: SHIPPED | OUTPATIENT
Start: 2023-03-16 | End: 2023-06-14

## 2023-03-16 NOTE — TELEPHONE ENCOUNTER
Recent Visits  Date Type Provider Dept   09/21/22 Office Visit MD Olamide Rocha   08/15/22 Office Visit MD Olamide Rocha   03/21/22 Office Visit Sara Jj MD [facility]   Showing recent visits within past 540 days with a meds authorizing provider and meeting all other requirements  Future Appointments  Date Type Provider Dept   03/21/23 Appointment Sara Jj MD [facility]   Showing future appointments within next 150 days with a meds authorizing provider and meeting all other requirements     12/1/2022

## 2023-03-17 ENCOUNTER — TRANSCRIBE ORDERS (OUTPATIENT)
Dept: ADMINISTRATIVE | Age: 83
End: 2023-03-17

## 2023-03-17 DIAGNOSIS — N28.89 OTHER SPECIFIED DISORDERS OF KIDNEY AND URETER: Primary | ICD-10-CM

## 2023-03-22 DIAGNOSIS — M81.0 AGE-RELATED OSTEOPOROSIS WITHOUT CURRENT PATHOLOGICAL FRACTURE: ICD-10-CM

## 2023-03-22 DIAGNOSIS — E03.9 ACQUIRED HYPOTHYROIDISM: ICD-10-CM

## 2023-03-22 DIAGNOSIS — E21.3 HYPERPARATHYROIDISM (HCC): ICD-10-CM

## 2023-03-23 LAB
25(OH)D3 SERPL-MCNC: 54.4 NG/ML
ALBUMIN SERPL-MCNC: 3.9 G/DL (ref 3.4–5)
ALBUMIN/GLOB SERPL: 1.3 {RATIO} (ref 1.1–2.2)
ALP SERPL-CCNC: 93 U/L (ref 40–129)
ALT SERPL-CCNC: 15 U/L (ref 10–40)
ANION GAP SERPL CALCULATED.3IONS-SCNC: 14 MMOL/L (ref 3–16)
AST SERPL-CCNC: 19 U/L (ref 15–37)
BILIRUB SERPL-MCNC: 0.3 MG/DL (ref 0–1)
BUN SERPL-MCNC: 14 MG/DL (ref 7–20)
CALCIUM SERPL-MCNC: 9.5 MG/DL (ref 8.3–10.6)
CHLORIDE SERPL-SCNC: 104 MMOL/L (ref 99–110)
CO2 SERPL-SCNC: 22 MMOL/L (ref 21–32)
CREAT SERPL-MCNC: 1 MG/DL (ref 0.6–1.2)
GFR SERPLBLD CREATININE-BSD FMLA CKD-EPI: 56 ML/MIN/{1.73_M2}
GLUCOSE SERPL-MCNC: 79 MG/DL (ref 70–99)
PHOSPHATE SERPL-MCNC: 3.1 MG/DL (ref 2.5–4.9)
POTASSIUM SERPL-SCNC: 4.5 MMOL/L (ref 3.5–5.1)
PROT SERPL-MCNC: 6.8 G/DL (ref 6.4–8.2)
PTH-INTACT SERPL-MCNC: 113.2 PG/ML (ref 14–72)
SODIUM SERPL-SCNC: 140 MMOL/L (ref 136–145)
T3 SERPL-MCNC: 1.27 NG/ML (ref 0.8–2)
TSH SERPL DL<=0.005 MIU/L-ACNC: 1.87 UIU/ML (ref 0.27–4.2)

## 2023-03-27 SDOH — ECONOMIC STABILITY: INCOME INSECURITY: HOW HARD IS IT FOR YOU TO PAY FOR THE VERY BASICS LIKE FOOD, HOUSING, MEDICAL CARE, AND HEATING?: NOT VERY HARD

## 2023-03-27 SDOH — ECONOMIC STABILITY: HOUSING INSECURITY
IN THE LAST 12 MONTHS, WAS THERE A TIME WHEN YOU DID NOT HAVE A STEADY PLACE TO SLEEP OR SLEPT IN A SHELTER (INCLUDING NOW)?: NO

## 2023-03-27 SDOH — ECONOMIC STABILITY: FOOD INSECURITY: WITHIN THE PAST 12 MONTHS, YOU WORRIED THAT YOUR FOOD WOULD RUN OUT BEFORE YOU GOT MONEY TO BUY MORE.: NEVER TRUE

## 2023-03-27 SDOH — ECONOMIC STABILITY: TRANSPORTATION INSECURITY
IN THE PAST 12 MONTHS, HAS LACK OF TRANSPORTATION KEPT YOU FROM MEETINGS, WORK, OR FROM GETTING THINGS NEEDED FOR DAILY LIVING?: NO

## 2023-03-27 SDOH — ECONOMIC STABILITY: FOOD INSECURITY: WITHIN THE PAST 12 MONTHS, THE FOOD YOU BOUGHT JUST DIDN'T LAST AND YOU DIDN'T HAVE MONEY TO GET MORE.: NEVER TRUE

## 2023-03-28 ENCOUNTER — NURSE ONLY (OUTPATIENT)
Dept: ENDOCRINOLOGY | Age: 83
End: 2023-03-28
Payer: MEDICARE

## 2023-03-28 ENCOUNTER — OFFICE VISIT (OUTPATIENT)
Dept: ENDOCRINOLOGY | Age: 83
End: 2023-03-28
Payer: MEDICARE

## 2023-03-28 VITALS
WEIGHT: 187 LBS | HEIGHT: 61 IN | TEMPERATURE: 98 F | HEART RATE: 67 BPM | RESPIRATION RATE: 14 BRPM | SYSTOLIC BLOOD PRESSURE: 122 MMHG | BODY MASS INDEX: 35.3 KG/M2 | DIASTOLIC BLOOD PRESSURE: 60 MMHG

## 2023-03-28 DIAGNOSIS — E03.9 ACQUIRED HYPOTHYROIDISM: Primary | ICD-10-CM

## 2023-03-28 DIAGNOSIS — M81.0 AGE-RELATED OSTEOPOROSIS WITHOUT CURRENT PATHOLOGICAL FRACTURE: Primary | ICD-10-CM

## 2023-03-28 DIAGNOSIS — E66.01 SEVERE OBESITY (BMI 35.0-39.9) WITH COMORBIDITY (HCC): ICD-10-CM

## 2023-03-28 DIAGNOSIS — E21.3 HYPERPARATHYROIDISM (HCC): ICD-10-CM

## 2023-03-28 PROCEDURE — 3078F DIAST BP <80 MM HG: CPT | Performed by: INTERNAL MEDICINE

## 2023-03-28 PROCEDURE — 99214 OFFICE O/P EST MOD 30 MIN: CPT | Performed by: INTERNAL MEDICINE

## 2023-03-28 PROCEDURE — 3074F SYST BP LT 130 MM HG: CPT | Performed by: INTERNAL MEDICINE

## 2023-03-28 PROCEDURE — G8399 PT W/DXA RESULTS DOCUMENT: HCPCS | Performed by: INTERNAL MEDICINE

## 2023-03-28 PROCEDURE — G8484 FLU IMMUNIZE NO ADMIN: HCPCS | Performed by: INTERNAL MEDICINE

## 2023-03-28 PROCEDURE — 1036F TOBACCO NON-USER: CPT | Performed by: INTERNAL MEDICINE

## 2023-03-28 PROCEDURE — 1090F PRES/ABSN URINE INCON ASSESS: CPT | Performed by: INTERNAL MEDICINE

## 2023-03-28 PROCEDURE — G8427 DOCREV CUR MEDS BY ELIG CLIN: HCPCS | Performed by: INTERNAL MEDICINE

## 2023-03-28 PROCEDURE — G8417 CALC BMI ABV UP PARAM F/U: HCPCS | Performed by: INTERNAL MEDICINE

## 2023-03-28 PROCEDURE — 1123F ACP DISCUSS/DSCN MKR DOCD: CPT | Performed by: INTERNAL MEDICINE

## 2023-03-28 NOTE — PROGRESS NOTES
fracture of L5 lumbar vertebra, with routine healing, subsequent encounter     Senile osteoporosis 2020    Bilateral leg edema 2019    Chronic combined systolic and diastolic heart failure ([facility] 2019    Idiopathic cardiomyopathy ([facility] 2019    Prediabetes 2019    Primary osteoarthritis involving multiple joints 2018    Insomnia 2018    Age-related osteoporosis without current pathological fracture 2018    S/p nephrectomy 2018    History of recurrent deep vein thrombosis (DVT) 2018    Chronic anticoagulation 2018    Acquired hypothyroidism 2018    Essential hypertension 2016    ALEXI on CPAP 10/29/2015    Aortic valve insufficiency 10/29/2015    DDD (degenerative disc disease), lumbar 2014    Spinal stenosis, lumbar 2014    Adenomatous polyp of colon 2012    GERD (gastroesophageal reflux disease) 2011    Fatigue 2011    Female stress incontinence 2010    H/O renal cell carcinoma 2009     Past Surgical History:   Procedure Laterality Date    APPENDECTOMY      BREAST BIOPSY       SECTION      x3    COLONOSCOPY N/A 2021    COLONOSCOPY DIAGNOSTIC performed by Brian Styles MD at [address] [place] [address], COLON, DIAGNOSTIC      KIDNEY SURGERY Right 2016    cryoablation procedure    RIGHT COLECTOMY Right 2012    lap, reurrent sessile adenoma    SUBTOTAL COLECTOMY      TOTAL NEPHRECTOMY Left     CA    TUBAL LIGATION      UPPER GASTROINTESTINAL ENDOSCOPY N/A 2022    EGD BIOPSY performed by Adwoa Dong MD at [address] [place] [address] ENDOSCOPY N/A 2022    EGD DILATION BALLOON performed by Adwoa Dong MD at [address] [place] [address]     Family History   Problem Relation Age of Onset    Heart Disease Mother     High Blood Pressure Mother     Heart Disease Father     Cancer Sister     High Blood Pressure Sister     No Known Problems Sister

## 2023-03-28 NOTE — PROGRESS NOTES
Prolia supplied by the physician office. Informed patient if any signs of redness,rash,swelling or unusual symptoms occur, please contact the office. Prolia given per physician order.

## 2023-03-29 ENCOUNTER — OFFICE VISIT (OUTPATIENT)
Dept: INTERNAL MEDICINE CLINIC | Age: 83
End: 2023-03-29

## 2023-03-29 VITALS
DIASTOLIC BLOOD PRESSURE: 72 MMHG | OXYGEN SATURATION: 96 % | HEART RATE: 67 BPM | BODY MASS INDEX: 34.93 KG/M2 | WEIGHT: 185 LBS | HEIGHT: 61 IN | SYSTOLIC BLOOD PRESSURE: 112 MMHG

## 2023-03-29 DIAGNOSIS — E66.01 SEVERE OBESITY (BMI 35.0-39.9) WITH COMORBIDITY (HCC): ICD-10-CM

## 2023-03-29 DIAGNOSIS — L30.9 DERMATITIS OF FACE: ICD-10-CM

## 2023-03-29 DIAGNOSIS — I10 ESSENTIAL HYPERTENSION: Primary | ICD-10-CM

## 2023-03-29 DIAGNOSIS — R73.03 PREDIABETES: ICD-10-CM

## 2023-03-29 DIAGNOSIS — I73.9 PAD (PERIPHERAL ARTERY DISEASE) (HCC): ICD-10-CM

## 2023-03-29 DIAGNOSIS — I82.409 RECURRENT DEEP VEIN THROMBOSIS (DVT) (HCC): ICD-10-CM

## 2023-03-29 DIAGNOSIS — G47.33 OSA ON CPAP: ICD-10-CM

## 2023-03-29 DIAGNOSIS — I50.42 CHRONIC COMBINED SYSTOLIC AND DIASTOLIC HEART FAILURE (HCC): ICD-10-CM

## 2023-03-29 DIAGNOSIS — Z99.89 OSA ON CPAP: ICD-10-CM

## 2023-03-29 PROBLEM — Z86.718 HISTORY OF RECURRENT DEEP VEIN THROMBOSIS (DVT): Status: RESOLVED | Noted: 2018-06-12 | Resolved: 2023-03-29

## 2023-03-29 PROBLEM — I47.29 NSVT (NONSUSTAINED VENTRICULAR TACHYCARDIA) (HCC): Status: RESOLVED | Noted: 2020-08-18 | Resolved: 2023-03-29

## 2023-03-29 PROBLEM — B36.9 DERMATITIS FUNGAL: Status: RESOLVED | Noted: 2022-09-21 | Resolved: 2023-03-29

## 2023-03-29 PROBLEM — M81.0 SENILE OSTEOPOROSIS: Status: RESOLVED | Noted: 2020-07-16 | Resolved: 2023-03-29

## 2023-03-29 PROBLEM — R06.09 DYSPNEA ON EXERTION: Status: RESOLVED | Noted: 2022-08-15 | Resolved: 2023-03-29

## 2023-03-29 ASSESSMENT — ENCOUNTER SYMPTOMS
CONSTIPATION: 0
SORE THROAT: 0
VOMITING: 0
WHEEZING: 0
SHORTNESS OF BREATH: 0
BACK PAIN: 0
NAUSEA: 0
COLOR CHANGE: 0
CHEST TIGHTNESS: 0
ABDOMINAL PAIN: 0
COUGH: 0

## 2023-03-29 ASSESSMENT — PATIENT HEALTH QUESTIONNAIRE - PHQ9
SUM OF ALL RESPONSES TO PHQ QUESTIONS 1-9: 0
2. FEELING DOWN, DEPRESSED OR HOPELESS: 0
SUM OF ALL RESPONSES TO PHQ QUESTIONS 1-9: 0
1. LITTLE INTEREST OR PLEASURE IN DOING THINGS: 0
SUM OF ALL RESPONSES TO PHQ9 QUESTIONS 1 & 2: 0

## 2023-03-29 NOTE — ASSESSMENT & PLAN NOTE
Patient with purplish discoloration and cool to touch toes both feet however good dorsalis pedis pulse, advised will check arterial Doppler .   She needs denying any pain

## 2023-03-29 NOTE — PROGRESS NOTES
ASSESSMENT/PLAN:  1. Essential hypertension  Assessment & Plan:   Blood pressure stable and well-controlled on current medications, continue same and follow-up with cardiology as scheduled, update labs with next blood draw  2. Chronic combined systolic and diastolic heart failure (HCC)  Assessment & Plan:   Stable, continue care and recommendation as per cardiology  Orders:  -     Lipid Panel; Future  -     CBC; Future  3. Recurrent deep vein thrombosis (DVT) (HCC)  Assessment & Plan:  Stable, remains on long-term anticoagulation  4. Prediabetes  Assessment & Plan:   Has been losing weight since joined [employer], goal is to achieve another 20 to 30 pounds weight loss, encouraged to continue adherence to healthy low-carb low-fat diet and active lifestyle  Orders:  -     Hemoglobin A1C; Future  -     CBC; Future  5. ALEXI on CPAP  Assessment & Plan:  Stable, continue same, encouraged to continue attempts for weight loss and advised once reach her goal with weight loss can attempt reevaluate need for CPAP  6. PAD (peripheral artery disease) (HCC)  Assessment & Plan:   Patient with purplish discoloration and cool to touch toes both feet however good dorsalis pedis pulse, advised will check arterial Doppler . She needs denying any pain  Orders:  -     VL DUP LOWER EXTREMITY ARTERIES BILATERAL; Future  7. Dermatitis of face  Assessment & Plan:   Advised to avoid using steroid cream near the eyelids, will refer to dermatology for further evaluation and recommendation  Orders:  -     Manju Mercado MD, Dermatology, [facility]  8. Severe obesity (BMI 35.0-39. 9) with comorbidity [facility])  Assessment & Plan:   Patient has been gradually losing weight after joining [employer], reports they told her the goal is to lose around 800 pounds, she believes this is too much for her, advised patient given her age and overall medical status goal should be to bring the BMI around 25, at this point recommended possibly

## 2023-03-29 NOTE — ASSESSMENT & PLAN NOTE
Has been losing weight since joined [employer], goal is to achieve another 20 to 30 pounds weight loss, encouraged to continue adherence to healthy low-carb low-fat diet and active lifestyle

## 2023-03-29 NOTE — ASSESSMENT & PLAN NOTE
Blood pressure stable and well-controlled on current medications, continue same and follow-up with cardiology as scheduled, update labs with next blood draw

## 2023-03-29 NOTE — ASSESSMENT & PLAN NOTE
Stable, continue same, encouraged to continue attempts for weight loss and advised once reach her goal with weight loss can attempt reevaluate need for CPAP

## 2023-03-29 NOTE — ASSESSMENT & PLAN NOTE
Advised to avoid using steroid cream near the eyelids, will refer to dermatology for further evaluation and recommendation

## 2023-03-29 NOTE — ASSESSMENT & PLAN NOTE
Patient has been gradually losing weight after joining [employer], reports they told her the goal is to lose around 800 pounds, she believes this is too much for her, advised patient given her age and overall medical status goal should be to bring the BMI around 25, at this point recommended possibly another 30 pounds weight loss and once she reaches that goal we can give her a note that she should go at the very slow pace given age and overall health status

## 2023-03-30 ENCOUNTER — HOSPITAL ENCOUNTER (OUTPATIENT)
Dept: ULTRASOUND IMAGING | Age: 83
Discharge: HOME | End: 2023-03-30
Payer: MEDICARE

## 2023-03-30 DIAGNOSIS — N28.89 OTHER SPECIFIED DISORDERS OF KIDNEY AND URETER: ICD-10-CM

## 2023-03-30 PROCEDURE — 76770 US EXAM ABDO BACK WALL COMP: CPT

## 2023-04-21 ENCOUNTER — HOSPITAL ENCOUNTER (OUTPATIENT)
Dept: VASCULAR LAB | Age: 83
Discharge: HOME | End: 2023-04-21
Payer: MEDICARE

## 2023-04-21 DIAGNOSIS — I73.9 PAD (PERIPHERAL ARTERY DISEASE) (HCC): ICD-10-CM

## 2023-04-21 PROCEDURE — 93925 LOWER EXTREMITY STUDY: CPT

## 2023-04-26 DIAGNOSIS — I82.409 RECURRENT DEEP VEIN THROMBOSIS (DVT) (HCC): ICD-10-CM

## 2023-04-26 RX ORDER — RIVAROXABAN 10 MG/1
TABLET, FILM COATED ORAL
Qty: 90 TABLET | Refills: 3 | Status: SHIPPED | OUTPATIENT
Start: 2023-04-26

## 2023-05-05 DIAGNOSIS — E03.9 ACQUIRED HYPOTHYROIDISM: ICD-10-CM

## 2023-05-08 RX ORDER — THYROID 60 MG
TABLET ORAL
Qty: 90 TABLET | Refills: 1 | Status: SHIPPED | OUTPATIENT
Start: 2023-05-08

## 2023-05-08 RX ORDER — THYROID,PORK 15 MG
TABLET ORAL
Qty: 90 TABLET | Refills: 1 | Status: SHIPPED | OUTPATIENT
Start: 2023-05-08

## 2023-06-23 DIAGNOSIS — R73.03 PREDIABETES: ICD-10-CM

## 2023-06-23 DIAGNOSIS — E66.01 SEVERE OBESITY (BMI 35.0-39.9) WITH COMORBIDITY (HCC): ICD-10-CM

## 2023-06-23 DIAGNOSIS — E21.3 HYPERPARATHYROIDISM (HCC): ICD-10-CM

## 2023-06-23 DIAGNOSIS — I50.42 CHRONIC COMBINED SYSTOLIC AND DIASTOLIC HEART FAILURE (HCC): ICD-10-CM

## 2023-06-23 DIAGNOSIS — E03.9 ACQUIRED HYPOTHYROIDISM: ICD-10-CM

## 2023-06-23 LAB
25(OH)D3 SERPL-MCNC: 54.9 NG/ML
ALBUMIN SERPL-MCNC: 3.8 G/DL (ref 3.4–5)
ALBUMIN/GLOB SERPL: 1.3 {RATIO} (ref 1.1–2.2)
ALP SERPL-CCNC: 101 U/L (ref 40–129)
ALT SERPL-CCNC: 14 U/L (ref 10–40)
ANION GAP SERPL CALCULATED.3IONS-SCNC: 9 MMOL/L (ref 3–16)
AST SERPL-CCNC: 16 U/L (ref 15–37)
BILIRUB SERPL-MCNC: 0.4 MG/DL (ref 0–1)
BUN SERPL-MCNC: 19 MG/DL (ref 7–20)
CALCIUM SERPL-MCNC: 9.3 MG/DL (ref 8.3–10.6)
CHLORIDE SERPL-SCNC: 104 MMOL/L (ref 99–110)
CHOLEST SERPL-MCNC: 212 MG/DL (ref 0–199)
CO2 SERPL-SCNC: 25 MMOL/L (ref 21–32)
CREAT SERPL-MCNC: 0.9 MG/DL (ref 0.6–1.2)
DEPRECATED RDW RBC AUTO: 14.8 % (ref 12.4–15.4)
GFR SERPLBLD CREATININE-BSD FMLA CKD-EPI: >60 ML/MIN/{1.73_M2}
GLUCOSE SERPL-MCNC: 94 MG/DL (ref 70–99)
HCT VFR BLD AUTO: 42.4 % (ref 36–48)
HDLC SERPL-MCNC: 99 MG/DL (ref 40–60)
HGB BLD-MCNC: 14.2 G/DL (ref 12–16)
LDLC SERPL CALC-MCNC: 88 MG/DL
MCH RBC QN AUTO: 30.7 PG (ref 26–34)
MCHC RBC AUTO-ENTMCNC: 33.4 G/DL (ref 31–36)
MCV RBC AUTO: 91.9 FL (ref 80–100)
PHOSPHATE SERPL-MCNC: 2.8 MG/DL (ref 2.5–4.9)
PLATELET # BLD AUTO: 152 K/UL (ref 135–450)
PMV BLD AUTO: 10.1 FL (ref 5–10.5)
POTASSIUM SERPL-SCNC: 4.8 MMOL/L (ref 3.5–5.1)
PROT SERPL-MCNC: 6.7 G/DL (ref 6.4–8.2)
PTH-INTACT SERPL-MCNC: 115 PG/ML (ref 14–72)
RBC # BLD AUTO: 4.62 M/UL (ref 4–5.2)
SODIUM SERPL-SCNC: 138 MMOL/L (ref 136–145)
TRIGL SERPL-MCNC: 124 MG/DL (ref 0–150)
VLDLC SERPL CALC-MCNC: 25 MG/DL
WBC # BLD AUTO: 6.8 K/UL (ref 4–11)

## 2023-06-24 LAB
EST. AVERAGE GLUCOSE BLD GHB EST-MCNC: 119.8 MG/DL
HBA1C MFR BLD: 5.8 %

## 2023-06-26 ENCOUNTER — TELEPHONE (OUTPATIENT)
Dept: ENDOCRINOLOGY | Age: 83
End: 2023-06-26

## 2023-07-17 ENCOUNTER — TELEMEDICINE (OUTPATIENT)
Dept: INTERNAL MEDICINE CLINIC | Age: 83
End: 2023-07-17
Payer: MEDICARE

## 2023-07-17 DIAGNOSIS — U07.1 COVID-19: ICD-10-CM

## 2023-07-17 PROCEDURE — G8427 DOCREV CUR MEDS BY ELIG CLIN: HCPCS | Performed by: NURSE PRACTITIONER

## 2023-07-17 PROCEDURE — 1123F ACP DISCUSS/DSCN MKR DOCD: CPT | Performed by: NURSE PRACTITIONER

## 2023-07-17 PROCEDURE — G8399 PT W/DXA RESULTS DOCUMENT: HCPCS | Performed by: NURSE PRACTITIONER

## 2023-07-17 PROCEDURE — 99212 OFFICE O/P EST SF 10 MIN: CPT | Performed by: NURSE PRACTITIONER

## 2023-07-17 PROCEDURE — 1090F PRES/ABSN URINE INCON ASSESS: CPT | Performed by: NURSE PRACTITIONER

## 2023-07-17 NOTE — PROGRESS NOTES
2023    TELEHEALTH EVALUATION -- Audio/Visual (During [id number]-[id number] public health emergency)    Chief Complaint   Patient presents with    Positive For Covid-19     Symptoms started Friday/Saturday. Tested positive  & Today. sinuses feel irritated & sinus drainage. Denies fevers     HPI:    Jass Valdes (:  1940) has requested an audio/video evaluation for the following concern(s):    VV for Covid 19   Symptoms started Friday/ Saturday, 3 days ago   Tested + on  and today  She complains of sinus pressure, fullness, sinus drainage, headaches, fatigue. Some mild cough from drainage. Not feeling as bad as she did when she had it previously. She denies any fever, chills, body aches, SOB  Taking tylenol which helped with HA  Used claritin and flonase - which helped some   Feeling a little better today compared to yesterday     Review of Systems  Negative other than HPI     Prior to Visit Medications    Medication Sig Taking? Authorizing Provider   NAKUL THYROID 15 MG tablet TAKE 1 TABLET BY MOUTH DAILY (WITH THE 60 MG DOSE FOR A TOTAL DOSE OF 75 MG) Yes MD NAKUL Pinto THYROID 60 MG tablet TAKE 1 TABLET BY MOUTH DAILY (WITH THE 15 MG DOSE FOR A TOTAL DOSE OF 75 MG). .NOT COVERED.  Yes Yang iLu MD   XARELTO 10 MG TABS tablet TAKE 1 TABLET BY MOUTH DAILY  WITH BREAKFAST Yes Mattie Toledo MD   Calcium Citrate-Vitamin D (CALCIUM + VIT D, BARIATRIC ADVANTAGE, CHEWABLE TABLET) Take 1 tablet by mouth 2 times daily Yes Yang Liu MD   famotidine (PEPCID) 20 MG tablet TAKE 1 TABLET BY MOUTH EVERY DAY IN THE EVENING Yes Mattie Toledo MD   estradiol 0.025 MG/24HR PTTW Place 4 applicators vaginally Twice a Week Yes Historical Provider, MD   clotrimazole-betamethasone (Juanita Farfan) 1-0.05 % cream Apply topically as needed (skin rash) Yes Mattie Toledo MD   Probiotic Product (PROBIOTIC DAILY PO) Take by mouth daily Yes Historical Provider, MD   metoprolol succinate (TOPROL XL) 50 MG

## 2023-07-17 NOTE — ASSESSMENT & PLAN NOTE
Acute, improving. Discussed options in detail and will continue with supportive care as she is improving some today.    Supportive care reviewed:  Humidified air  Honey lemon tea  Nasal rinse prn  Flonase daily  Tylenol for pain and fever  Mucinex prn for congestion   Ambulation benefits reviewed- encouraged   Daily multivitamin with zinc  covid transmission precautions reviewed   Check pulse ox and go to ED/ urgent care if drops below 92% and/ or uncontrolled worsening dyspnea

## 2023-07-18 ENCOUNTER — PATIENT MESSAGE (OUTPATIENT)
Dept: INTERNAL MEDICINE CLINIC | Age: 83
End: 2023-07-18

## 2023-07-18 DIAGNOSIS — U07.1 COVID: ICD-10-CM

## 2023-08-04 ENCOUNTER — PATIENT MESSAGE (OUTPATIENT)
Dept: INTERNAL MEDICINE CLINIC | Age: 83
End: 2023-08-04

## 2023-08-04 DIAGNOSIS — R05.3 POST-COVID CHRONIC COUGH: Primary | ICD-10-CM

## 2023-08-04 DIAGNOSIS — U09.9 POST-COVID CHRONIC COUGH: Primary | ICD-10-CM

## 2023-08-04 NOTE — TELEPHONE ENCOUNTER
Patient called the office to schedule and spoke with  staff. No availability today with any providers. Would you want to add patient on the schedule today?

## 2023-08-04 NOTE — TELEPHONE ENCOUNTER
From: Kelley Camacho  To: Dr. Pichardo Salvage: 8/4/2023 10:07 AM EDT  Subject: Clenancys Constable On    Dr. Courtney Rodriguez:  After having COVID, I tested negative last Friday. But before that, I was having respiratory issues . .. a feeling of irritation at the top of my lungs and my breathing did not feel normal. I used Flonase and took Mucinex and it helped in some ways but it seemed to get worse so I stopped both. The past few days, coughing began and it has gotten worse each day. Yesterday, my breathing was impacted and last night, I ran a fever of 99.4 when my normal is 97.4. I woke up sweating at 12:30 and my bed is wet this morning. I don't have a fever this morning but it feels almost like bronchitis, just not as bad. I'm not hoarse, it's farther down. 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 What should I do?     Shonna Manner

## 2023-08-04 NOTE — TELEPHONE ENCOUNTER
Please advise patient needs to be evaluated, if unable to be seen today then should go to urgent care

## 2023-08-07 ENCOUNTER — OFFICE VISIT (OUTPATIENT)
Dept: INTERNAL MEDICINE CLINIC | Age: 83
End: 2023-08-07

## 2023-08-07 VITALS
OXYGEN SATURATION: 97 % | SYSTOLIC BLOOD PRESSURE: 132 MMHG | WEIGHT: 185.2 LBS | DIASTOLIC BLOOD PRESSURE: 68 MMHG | HEART RATE: 76 BPM | BODY MASS INDEX: 34.99 KG/M2

## 2023-08-07 DIAGNOSIS — I10 ESSENTIAL HYPERTENSION: ICD-10-CM

## 2023-08-07 DIAGNOSIS — J40 BRONCHITIS: Primary | ICD-10-CM

## 2023-08-07 ASSESSMENT — ENCOUNTER SYMPTOMS
VOMITING: 0
SORE THROAT: 0
CHEST TIGHTNESS: 0
BACK PAIN: 0
COLOR CHANGE: 0
COUGH: 1
CONSTIPATION: 0
ABDOMINAL PAIN: 0
NAUSEA: 0
WHEEZING: 0
SHORTNESS OF BREATH: 0

## 2023-08-07 NOTE — TELEPHONE ENCOUNTER
Can follow-up if not feeling better with the treatment or still with concerns otherwise just finish recommended therapy and ensure adequate hydration

## 2023-08-07 NOTE — ASSESSMENT & PLAN NOTE
exam within normal findings, advised patient no need to start the antibiotics since she has not done so yet, can also discontinue prednisone, recommended to ensure adequate fluid intake, drink warm liquids like lynsey and honey lemon teas, do Mucinex DM to help with expectoration and call the office if no improvement over the next 4 to 5 days. Advised if by the end of the week no significant improvement then will check chest x-ray.   Patient verbalized understanding

## 2023-08-07 NOTE — PROGRESS NOTES
ASSESSMENT/PLAN:  1. Bronchitis  Assessment & Plan:    exam within normal findings, advised patient no need to start the antibiotics since she has not done so yet, can also discontinue prednisone, recommended to ensure adequate fluid intake, drink warm liquids like lynsey and honey lemon teas, do Mucinex DM to help with expectoration and call the office if no improvement over the next 4 to 5 days. Advised if by the end of the week no significant improvement then will check chest x-ray. Patient verbalized understanding  2. Essential hypertension  Assessment & Plan:    blood pressure stable on current medications, continue same and follow-up as scheduled next month      Return for as scheduled. SUBJECTIVE  HPI:   Seen in urgent care 2 days ago for cough, she had COVID infection over 3 weeks ago and then symptoms improved and tested negative last week however earlier last week she started having cough and low-grade temperature for 1 day, her cough persisted for the next few days it was mostly dry after Friday she got 1 episode where she had brownish sputum which made her concerned so she went to the urgent care. She is not having any fever or chills, denies any runny nose, no chest pain or shortness of breath. Cough is mostly dry except for that one time. In the urgent care she was given a prescription for prednisone taper along with doxycycline which she did not start because she was concerned about the side effects. He did however use the prednisone but at a lower dose because it caused irritability and insomnia when she took the 20 mg      Review of Systems   Constitutional:  Negative for activity change, appetite change, fatigue and fever. HENT:  Negative for congestion, hearing loss, mouth sores and sore throat. Respiratory:  Positive for cough. Negative for chest tightness, shortness of breath and wheezing. Cardiovascular:  Negative for chest pain, palpitations and leg swelling.

## 2023-08-11 ENCOUNTER — HOSPITAL ENCOUNTER (OUTPATIENT)
Age: 83
Discharge: HOME | End: 2023-08-11
Payer: MEDICARE

## 2023-08-11 ENCOUNTER — HOSPITAL ENCOUNTER (OUTPATIENT)
Dept: GENERAL RADIOLOGY | Age: 83
Discharge: HOME | End: 2023-08-11
Payer: MEDICARE

## 2023-08-11 DIAGNOSIS — R05.3 POST-COVID CHRONIC COUGH: ICD-10-CM

## 2023-08-11 DIAGNOSIS — U09.9 POST-COVID CHRONIC COUGH: ICD-10-CM

## 2023-08-11 PROCEDURE — 71046 X-RAY EXAM CHEST 2 VIEWS: CPT

## 2023-08-11 RX ORDER — FLUTICASONE PROPIONATE 110 UG/1
2 AEROSOL, METERED RESPIRATORY (INHALATION) 2 TIMES DAILY
Qty: 12 G | Refills: 0 | Status: SHIPPED | OUTPATIENT
Start: 2023-08-11 | End: 2024-08-10

## 2023-08-11 NOTE — TELEPHONE ENCOUNTER
Please advise patient I did place an order for a chest x-ray to be completed, I also ordered Flovent inhaler to her pharmacy to use twice a day to see if this will help with the cough and shortness of breath

## 2023-08-11 NOTE — TELEPHONE ENCOUNTER
Pt called to check on the status of her message. I let her know that Dr. Carlos Hager responded, and that her MA hasn't gotten the chance to call back yet. I read her the message regarding the Chest X Ray & prescription. I did let the pt know that she doesn't need an appt for the chest x ray and that when she goes to the hospital, they'll be able to see the order in the system. Pt was very thankful.

## 2023-09-08 DIAGNOSIS — Z85.528 H/O RENAL CELL CARCINOMA: ICD-10-CM

## 2023-09-08 DIAGNOSIS — I10 ESSENTIAL HYPERTENSION: ICD-10-CM

## 2023-09-08 DIAGNOSIS — Z90.5 S/P NEPHRECTOMY: ICD-10-CM

## 2023-09-08 DIAGNOSIS — N18.31 STAGE 3A CHRONIC KIDNEY DISEASE (HCC): ICD-10-CM

## 2023-09-08 LAB
ANION GAP SERPL CALCULATED.3IONS-SCNC: 6 MMOL/L (ref 3–16)
BUN SERPL-MCNC: 14 MG/DL (ref 7–20)
CALCIUM SERPL-MCNC: 9.3 MG/DL (ref 8.3–10.6)
CHLORIDE SERPL-SCNC: 104 MMOL/L (ref 99–110)
CO2 SERPL-SCNC: 25 MMOL/L (ref 21–32)
CREAT SERPL-MCNC: 1 MG/DL (ref 0.6–1.2)
GFR SERPLBLD CREATININE-BSD FMLA CKD-EPI: 56 ML/MIN/{1.73_M2}
GLUCOSE SERPL-MCNC: 110 MG/DL (ref 70–99)
POTASSIUM SERPL-SCNC: 4.7 MMOL/L (ref 3.5–5.1)
SODIUM SERPL-SCNC: 135 MMOL/L (ref 136–145)

## 2023-09-11 ENCOUNTER — TELEPHONE (OUTPATIENT)
Dept: ENDOCRINOLOGY | Age: 83
End: 2023-09-11

## 2023-09-12 NOTE — TELEPHONE ENCOUNTER
[employer] [facility] [place] [employer] approved. Your Authorization Request Has Been Approved  Your authorization request number is [id number].    Authorization Status  Approved  Authorization Number  [id number]   Authorization Start Date  09-  Authorization End Date  09-

## 2023-09-28 SDOH — HEALTH STABILITY: PHYSICAL HEALTH: ON AVERAGE, HOW MANY DAYS PER WEEK DO YOU ENGAGE IN MODERATE TO STRENUOUS EXERCISE (LIKE A BRISK WALK)?: 0 DAYS

## 2023-09-28 ASSESSMENT — LIFESTYLE VARIABLES
HOW MANY STANDARD DRINKS CONTAINING ALCOHOL DO YOU HAVE ON A TYPICAL DAY: 1 OR 2
HOW OFTEN DO YOU HAVE SIX OR MORE DRINKS ON ONE OCCASION: 1
HOW OFTEN DO YOU HAVE A DRINK CONTAINING ALCOHOL: MONTHLY OR LESS
HOW MANY STANDARD DRINKS CONTAINING ALCOHOL DO YOU HAVE ON A TYPICAL DAY: 1
HOW OFTEN DO YOU HAVE A DRINK CONTAINING ALCOHOL: 2

## 2023-09-28 ASSESSMENT — PATIENT HEALTH QUESTIONNAIRE - PHQ9
2. FEELING DOWN, DEPRESSED OR HOPELESS: 0
SUM OF ALL RESPONSES TO PHQ QUESTIONS 1-9: 0
SUM OF ALL RESPONSES TO PHQ9 QUESTIONS 1 & 2: 0
SUM OF ALL RESPONSES TO PHQ QUESTIONS 1-9: 0
1. LITTLE INTEREST OR PLEASURE IN DOING THINGS: 0

## 2023-09-29 ENCOUNTER — OFFICE VISIT (OUTPATIENT)
Dept: INTERNAL MEDICINE CLINIC | Age: 83
End: 2023-09-29

## 2023-09-29 VITALS
OXYGEN SATURATION: 98 % | WEIGHT: 189.2 LBS | DIASTOLIC BLOOD PRESSURE: 78 MMHG | HEART RATE: 71 BPM | BODY MASS INDEX: 35.72 KG/M2 | HEIGHT: 61 IN | SYSTOLIC BLOOD PRESSURE: 130 MMHG

## 2023-09-29 DIAGNOSIS — I10 ESSENTIAL HYPERTENSION: ICD-10-CM

## 2023-09-29 DIAGNOSIS — Z00.00 MEDICARE ANNUAL WELLNESS VISIT, SUBSEQUENT: Primary | ICD-10-CM

## 2023-09-29 DIAGNOSIS — I50.42 CHRONIC COMBINED SYSTOLIC AND DIASTOLIC HEART FAILURE (HCC): ICD-10-CM

## 2023-09-29 DIAGNOSIS — Z23 FLU VACCINE NEED: ICD-10-CM

## 2023-09-29 DIAGNOSIS — G47.33 OSA ON CPAP: ICD-10-CM

## 2023-09-29 DIAGNOSIS — F51.04 PSYCHOPHYSIOLOGICAL INSOMNIA: ICD-10-CM

## 2023-09-29 PROBLEM — U07.1 COVID-19: Status: RESOLVED | Noted: 2023-07-17 | Resolved: 2023-09-29

## 2023-09-29 PROBLEM — J40 BRONCHITIS: Status: RESOLVED | Noted: 2023-08-07 | Resolved: 2023-09-29

## 2023-09-29 ASSESSMENT — ENCOUNTER SYMPTOMS
WHEEZING: 0
VOMITING: 0
CONSTIPATION: 0
BACK PAIN: 0
ABDOMINAL PAIN: 0
CHEST TIGHTNESS: 0
SORE THROAT: 0
COLOR CHANGE: 0
SHORTNESS OF BREATH: 0
COUGH: 0
NAUSEA: 0

## 2023-10-03 ENCOUNTER — NURSE ONLY (OUTPATIENT)
Dept: ENDOCRINOLOGY | Age: 83
End: 2023-10-03
Payer: MEDICARE

## 2023-10-03 DIAGNOSIS — M81.0 AGE-RELATED OSTEOPOROSIS WITHOUT CURRENT PATHOLOGICAL FRACTURE: ICD-10-CM

## 2023-10-03 DIAGNOSIS — E03.9 ACQUIRED HYPOTHYROIDISM: Primary | ICD-10-CM

## 2023-10-03 PROCEDURE — 96372 THER/PROPH/DIAG INJ SC/IM: CPT | Performed by: INTERNAL MEDICINE

## 2023-10-03 NOTE — PROGRESS NOTES
Patient presents for a prolia injection. Pt denies any prior adverse reactions. Prolia 60 mg given SubQ in the left arm with no complications. Patient tolerated well. Patient to return to office in 6 months for next injection.      Lot: [id number]  Exp: 2/28/2026

## 2023-10-06 DIAGNOSIS — S32.050D CLOSED COMPRESSION FRACTURE OF L5 LUMBAR VERTEBRA, WITH ROUTINE HEALING, SUBSEQUENT ENCOUNTER: ICD-10-CM

## 2023-10-06 DIAGNOSIS — M15.9 PRIMARY OSTEOARTHRITIS INVOLVING MULTIPLE JOINTS: ICD-10-CM

## 2023-10-06 RX ORDER — GABAPENTIN 100 MG/1
100 CAPSULE ORAL NIGHTLY
Qty: 90 CAPSULE | Refills: 1 | Status: SHIPPED | OUTPATIENT
Start: 2023-10-06 | End: 2024-01-04

## 2023-10-13 ENCOUNTER — PATIENT MESSAGE (OUTPATIENT)
Dept: INTERNAL MEDICINE CLINIC | Age: 83
End: 2023-10-13

## 2023-10-13 DIAGNOSIS — I82.409 RECURRENT DEEP VEIN THROMBOSIS (DVT) (HCC): ICD-10-CM

## 2023-10-13 NOTE — TELEPHONE ENCOUNTER
From: Liam Chavira  To: Dr. Patel Ouch: 10/13/2023 4:13 PM EDT  Subject: Parthenia Gosling Prescription    Dr. Abebe Rodriguesy been paying almost $200 every 3 months for my [place] Thyroid so I did all the [employer] paperwork and I was approved . .. jennifer!! Since that went through, I thought I'd check on Xarelto since my last copay was $470 for a 3-month supply and it'll need to be refilled again before the end of the year. So I looked into a program for that and was approved. A pharmacy called [facility] will be filling it now instead of [facility]. [facility] called your office and someone said [facility] will need to call [facility] to transfer the prescription so I [facility] but [facility] says [facility] needs to call them but [facility] said [facility] will need to call them which [facility] refuses to do. Can you please send a prescription to [facility] so I can save on Xarelto? Phone [phone] Fax [phone]. I don't need a refill until early-December but they'd need to have the script on file so I'm in the program. Thanks.   Rob Thakur

## 2023-10-16 RX ORDER — RIVAROXABAN 10 MG/1
10 TABLET, FILM COATED ORAL DAILY
Qty: 90 TABLET | Refills: 3 | Status: SHIPPED | OUTPATIENT
Start: 2023-10-16

## 2023-11-01 DIAGNOSIS — K21.9 GASTROESOPHAGEAL REFLUX DISEASE WITHOUT ESOPHAGITIS: ICD-10-CM

## 2023-11-01 DIAGNOSIS — E03.9 ACQUIRED HYPOTHYROIDISM: ICD-10-CM

## 2023-11-01 RX ORDER — FAMOTIDINE 20 MG/1
TABLET, FILM COATED ORAL
Qty: 90 TABLET | Refills: 3 | Status: SHIPPED | OUTPATIENT
Start: 2023-11-01

## 2023-11-01 RX ORDER — THYROID 60 MG
TABLET ORAL
Qty: 90 TABLET | Refills: 0 | Status: SHIPPED | OUTPATIENT
Start: 2023-11-01

## 2023-11-01 RX ORDER — THYROID,PORK 15 MG
TABLET ORAL
Qty: 90 TABLET | Refills: 0 | Status: SHIPPED | OUTPATIENT
Start: 2023-11-01

## 2023-11-07 DIAGNOSIS — E03.9 ACQUIRED HYPOTHYROIDISM: ICD-10-CM

## 2023-11-07 DIAGNOSIS — E21.3 HYPERPARATHYROIDISM (HCC): ICD-10-CM

## 2023-11-07 DIAGNOSIS — E66.01 SEVERE OBESITY (BMI 35.0-39.9) WITH COMORBIDITY (HCC): ICD-10-CM

## 2023-11-08 LAB
25(OH)D3 SERPL-MCNC: 48.4 NG/ML
ALBUMIN SERPL-MCNC: 3.7 G/DL (ref 3.4–5)
ALBUMIN/GLOB SERPL: 1.6 {RATIO} (ref 1.1–2.2)
ALP SERPL-CCNC: 89 U/L (ref 40–129)
ALT SERPL-CCNC: 18 U/L (ref 10–40)
ANION GAP SERPL CALCULATED.3IONS-SCNC: 11 MMOL/L (ref 3–16)
AST SERPL-CCNC: 19 U/L (ref 15–37)
BILIRUB SERPL-MCNC: 0.4 MG/DL (ref 0–1)
BUN SERPL-MCNC: 13 MG/DL (ref 7–20)
CALCIUM SERPL-MCNC: 9.3 MG/DL (ref 8.3–10.6)
CHLORIDE SERPL-SCNC: 104 MMOL/L (ref 99–110)
CO2 SERPL-SCNC: 24 MMOL/L (ref 21–32)
CREAT SERPL-MCNC: 1 MG/DL (ref 0.6–1.2)
GFR SERPLBLD CREATININE-BSD FMLA CKD-EPI: 56 ML/MIN/{1.73_M2}
GLUCOSE SERPL-MCNC: 93 MG/DL (ref 70–99)
PHOSPHATE SERPL-MCNC: 3.2 MG/DL (ref 2.5–4.9)
POTASSIUM SERPL-SCNC: 4.7 MMOL/L (ref 3.5–5.1)
PROT SERPL-MCNC: 6 G/DL (ref 6.4–8.2)
PTH-INTACT SERPL-MCNC: 47.8 PG/ML (ref 14–72)
SODIUM SERPL-SCNC: 139 MMOL/L (ref 136–145)
T3 SERPL-MCNC: 1.47 NG/ML (ref 0.8–2)
TSH SERPL DL<=0.005 MIU/L-ACNC: 2.63 UIU/ML (ref 0.27–4.2)

## 2023-11-14 ENCOUNTER — TELEPHONE (OUTPATIENT)
Dept: ENDOCRINOLOGY | Age: 83
End: 2023-11-14

## 2023-11-14 ENCOUNTER — OFFICE VISIT (OUTPATIENT)
Dept: ENDOCRINOLOGY | Age: 83
End: 2023-11-14
Payer: MEDICARE

## 2023-11-14 VITALS
TEMPERATURE: 98 F | WEIGHT: 186 LBS | BODY MASS INDEX: 35.12 KG/M2 | HEART RATE: 74 BPM | SYSTOLIC BLOOD PRESSURE: 113 MMHG | RESPIRATION RATE: 14 BRPM | DIASTOLIC BLOOD PRESSURE: 61 MMHG | HEIGHT: 61 IN

## 2023-11-14 DIAGNOSIS — S32.050D CLOSED COMPRESSION FRACTURE OF L5 LUMBAR VERTEBRA, WITH ROUTINE HEALING, SUBSEQUENT ENCOUNTER: ICD-10-CM

## 2023-11-14 DIAGNOSIS — I42.9 IDIOPATHIC CARDIOMYOPATHY (HCC): Primary | ICD-10-CM

## 2023-11-14 DIAGNOSIS — I10 ESSENTIAL HYPERTENSION: ICD-10-CM

## 2023-11-14 DIAGNOSIS — R73.03 PREDIABETES: ICD-10-CM

## 2023-11-14 DIAGNOSIS — M81.0 AGE-RELATED OSTEOPOROSIS WITHOUT CURRENT PATHOLOGICAL FRACTURE: ICD-10-CM

## 2023-11-14 PROCEDURE — 1036F TOBACCO NON-USER: CPT | Performed by: INTERNAL MEDICINE

## 2023-11-14 PROCEDURE — 99214 OFFICE O/P EST MOD 30 MIN: CPT | Performed by: INTERNAL MEDICINE

## 2023-11-14 PROCEDURE — G8484 FLU IMMUNIZE NO ADMIN: HCPCS | Performed by: INTERNAL MEDICINE

## 2023-11-14 PROCEDURE — 1090F PRES/ABSN URINE INCON ASSESS: CPT | Performed by: INTERNAL MEDICINE

## 2023-11-14 PROCEDURE — G8417 CALC BMI ABV UP PARAM F/U: HCPCS | Performed by: INTERNAL MEDICINE

## 2023-11-14 PROCEDURE — 1123F ACP DISCUSS/DSCN MKR DOCD: CPT | Performed by: INTERNAL MEDICINE

## 2023-11-14 PROCEDURE — 3074F SYST BP LT 130 MM HG: CPT | Performed by: INTERNAL MEDICINE

## 2023-11-14 PROCEDURE — G8427 DOCREV CUR MEDS BY ELIG CLIN: HCPCS | Performed by: INTERNAL MEDICINE

## 2023-11-14 PROCEDURE — 3078F DIAST BP <80 MM HG: CPT | Performed by: INTERNAL MEDICINE

## 2023-11-14 PROCEDURE — G8399 PT W/DXA RESULTS DOCUMENT: HCPCS | Performed by: INTERNAL MEDICINE

## 2023-11-14 RX ORDER — AMLODIPINE BESYLATE 2.5 MG/1
2.5 TABLET ORAL DAILY
COMMUNITY
Start: 2023-11-08

## 2023-11-14 NOTE — TELEPHONE ENCOUNTER
Fax from [facility] w/ approval for Penelope Thyroid 60mg tab , 15mg tab assistance through 12/31/24

## 2024-01-25 ENCOUNTER — PATIENT MESSAGE (OUTPATIENT)
Dept: INTERNAL MEDICINE CLINIC | Age: 84
End: 2024-01-25

## 2024-01-25 DIAGNOSIS — R06.09 DOE (DYSPNEA ON EXERTION): ICD-10-CM

## 2024-01-25 DIAGNOSIS — R26.81 UNSTEADY GAIT WHEN WALKING: ICD-10-CM

## 2024-01-25 DIAGNOSIS — M15.9 PRIMARY OSTEOARTHRITIS INVOLVING MULTIPLE JOINTS: Primary | ICD-10-CM

## 2024-01-25 RX ORDER — ALBUTEROL SULFATE 90 UG/1
2 AEROSOL, METERED RESPIRATORY (INHALATION) 4 TIMES DAILY PRN
Qty: 18 G | Refills: 0 | Status: SHIPPED | OUTPATIENT
Start: 2024-01-25

## 2024-01-25 NOTE — TELEPHONE ENCOUNTER
From: Анна Vivar  To: Dr. Felton Allen  Sent: 1/25/2024 12:33 PM EST  Subject: Breathlessness    Dr. Allen, I'm still suffering with breathlessness. When researching long-term COVID symptoms, I found that fatigue and breathlessness are the #1 and #2 complaints from people who have had COVID. I had it after COVID in Dec 2022 and still have it after COVID in July 2023. Sometimes, it can be almost debilitating and it takes me a couple of hours to recover. Is there anything I can do to alleviate this or get past it?    Also, my iPhone tells me my walking shows low steadiness and that this is a sign I could fall. Could I get a couple of sessions with a physical therapist so that I can learn to do some balance exercises?     Анна Vivar

## 2024-01-25 NOTE — TELEPHONE ENCOUNTER
Please advise patient her chronic shortness of breath is  multifactorial given her current medical diagnoses in addition to residual effect of COVID infection.  There is really no specific treatment for that, she just needs to continue her current medical regimen as recommended by cardiology, ensure compliance with her APAP use and can try as needed albuterol inhaler to see if this will help with bronchospasm.  As far as physical therapy referral is placed.  If she still has any concerns she can move up her March appointment

## 2024-01-26 ENCOUNTER — PATIENT MESSAGE (OUTPATIENT)
Dept: INTERNAL MEDICINE CLINIC | Age: 84
End: 2024-01-26

## 2024-01-26 DIAGNOSIS — Z12.31 BREAST CANCER SCREENING BY MAMMOGRAM: Primary | ICD-10-CM

## 2024-01-26 NOTE — TELEPHONE ENCOUNTER
From: Анна Vivar  To: Dr. Felton Allen  Sent: 1/26/2024 11:33 AM EST  Subject: Mammogram    Dr. Allen:  I have a mammogram scheduled for Tuesday morning. I can't remember if I need an order from you for that but if I should, will you send one over to [facility]?    Thanks.    Анна Vivar   Eye Shield Used: No

## 2024-01-30 ENCOUNTER — HOSPITAL ENCOUNTER (OUTPATIENT)
Dept: WOMENS IMAGING | Age: 84
Discharge: HOME | End: 2024-01-30
Payer: MEDICARE

## 2024-01-30 VITALS — HEIGHT: 62 IN | WEIGHT: 188 LBS | BODY MASS INDEX: 34.6 KG/M2

## 2024-01-30 DIAGNOSIS — Z12.31 BREAST CANCER SCREENING BY MAMMOGRAM: ICD-10-CM

## 2024-01-30 PROCEDURE — 77063 BREAST TOMOSYNTHESIS BI: CPT

## 2024-02-07 ENCOUNTER — HOSPITAL ENCOUNTER (OUTPATIENT)
Dept: PHYSICAL THERAPY | Age: 84
Setting detail: THERAPIES SERIES
Discharge: HOME | End: 2024-02-07

## 2024-02-14 ENCOUNTER — HOSPITAL ENCOUNTER (OUTPATIENT)
Dept: PHYSICAL THERAPY | Age: 84
Setting detail: THERAPIES SERIES
Discharge: HOME | End: 2024-02-14
Payer: MEDICARE

## 2024-02-14 DIAGNOSIS — R26.89 BALANCE PROBLEM: ICD-10-CM

## 2024-02-14 DIAGNOSIS — R29.898 IMPAIRED FLEXIBILITY OF LOWER EXTREMITY: ICD-10-CM

## 2024-02-14 DIAGNOSIS — R26.9 GAIT DIFFICULTY: ICD-10-CM

## 2024-02-14 DIAGNOSIS — R29.898 IMPAIRED STRENGTH OF HIP MUSCLES: Primary | ICD-10-CM

## 2024-02-14 PROCEDURE — 97530 THERAPEUTIC ACTIVITIES: CPT

## 2024-02-14 PROCEDURE — 97161 PT EVAL LOW COMPLEX 20 MIN: CPT

## 2024-02-14 PROCEDURE — 97110 THERAPEUTIC EXERCISES: CPT

## 2024-02-14 NOTE — PLAN OF CARE
(norm) AROM L AROM R Notes PROM L PROM R Notes               LUMBAR Flex (90)         Ext (25)         SB (25)          Rotation (30)                       HIP Flex (120) WNL WNL        Abd (45)          ER (50) WNL WNL        IR (45) WNL WNL        Ext (20)                   KNEE Flex (140)          Ext (0)                     ANKLE DF (20)          PF (50)          Inversion (30)          Eversion (20)             MMT L                MMT  R Notes     LUMBAR Flexion      Extension      Lateral flexion       Rotation          MMT L MMT R Notes       HIP Flexion 3 3+     Abduction 4- 3+     ER       IR       Extension 4 3+           KNEE Flexion 4+ 4+     Extension 4 4+             ANKLE DF 4+ 4+     PF   25+ HR DL with fingertips for balance  Able to produce 3+ SL HR with 75% height B    Inversion       Eversion        Repeated Movements: [] Normal  [] Abnormal [] N/A    Palpation:   NT  Location: NA    Posture:   WNL    Bandages/Dressings/Incisions:  Not Applicable    Dermatomes: Abnormal findings listed below  NT    Myotomes: Abnormal findings listed below  NT    Reflexes: Abnormal findings listed below  Not tested    Specific Joint Mobility Testing/Accessory Motions:      N/A    Special Tests:  Ely's (rectus femoris tightness): Positive on R and Positive on L  Hamstring 90-90 straight leg raise test: R lacking 25 deg, L lacking 40 deg    Gait:    Pattern: decreased step length (decreased length in extension during terminal stance)  Assistive Device Used: no AD    Balance:  [x] WNL      [] NT       [] Dysfunction noted  Comment: tandem EO on firm: 10+ seconds with mild unsteadiness, tandem on firm with EC 3 second with imbalance, tandem on foam EO 7 sec with excessive ankle strategies.     Falls Risk Assessment (30 days):   Falls Risk assessed and no intervention required.  Time Up and Go (TUG):   Not Assessed     ASSESSMENT   Assessment:   Анна Vivar is a 83 y.o. female presenting today to Outpatient PT

## 2024-02-15 NOTE — FLOWSHEET NOTE
Status: [] Progressing: [] Met: [] Not Met: [] Adjusted    Overall Progression Towards Functional goals/ Treatment Progress Update:  [] Patient is progressing as expected towards functional goals listed.    [] Progression is slowed due to complexities/Impairments listed.  [] Progression has been slowed due to co-morbidities.  [x] Plan just implemented, too soon (<30days) to assess goals progression   [] Goals require adjustment due to lack of progress  [] Patient is not progressing as expected and requires additional follow up with physician  [] Other:     CHARGE CAPTURE     PT CHARGE GRID   CPT Code (TIMED) minutes # CPT Code (UNTIMED) #     Therex (78748)  15 1  EVAL:LOW (51075 - Typically 20 minutes face-to-face) 1    Neuromusc. Re-ed (74868)    Re-Eval (87147)     Manual (48752)    Estim Unattended (33218)     Ther. Act (47172) 10 1  [facility] T[facility] (68016)     Gait (09017)    Dry Needle 1-2 muscle (91010)     Aquatic Therex (62917)    Dry Needle 3+ muscle (20561)     Iontophoresis (28655)    VASO (20291)     Ultrasound (29618)    Group Therapy (84138)     Estim Attended (28284)    Canalith Repositioning (15241)     Other:    Other:    Total Timed Code Tx Minutes 25 2  1     Total Treatment Minutes 50        Charge Justification:  (77172) HOME EXERCISE PROGRAM - Reviewed/Progressed HEP activities related to strengthening, flexibility, endurance, ROM performed to prevent loss of range of motion, maintain or improve muscular strength or increase flexibility, following either an injury or surgery.  (42072) THERAPEUTIC ACTIVITY - use of dynamic activities to improve functional performance. (Ex include squatting, ascending/descending stairs, walking, bending, lifting, catching, throwing, pushing, pulling, jumping.)  Direct, one on one contact, billed in 15-minute increments.    TREATMENT PLAN   Plan: POC Initiated today- see eval for details    Electronically

## 2024-02-26 ENCOUNTER — TELEPHONE (OUTPATIENT)
Dept: ENDOCRINOLOGY | Age: 84
End: 2024-02-26

## 2024-02-27 ENCOUNTER — HOSPITAL ENCOUNTER (OUTPATIENT)
Dept: PHYSICAL THERAPY | Age: 84
Setting detail: THERAPIES SERIES
Discharge: HOME | End: 2024-02-27
Payer: MEDICARE

## 2024-02-27 PROCEDURE — 97750 PHYSICAL PERFORMANCE TEST: CPT

## 2024-02-27 PROCEDURE — 97110 THERAPEUTIC EXERCISES: CPT

## 2024-02-27 NOTE — FLOWSHEET NOTE
and ADLs as indicated by functional deficits.  Status: [] Progressing: [] Met: [] Not Met: [] Adjusted  Pt to improve strength to 4+/5 or better of proximal hipto allow for proper muscle and joint use in functional mobility, ADLs and prior level of function and to assist patient in tolerating ambulation on uneven surfaces, inclines, and prolonged distances.   Status: [] Progressing: [] Met: [] Not Met: [] Adjusted  Patient will demo full flexibility on Ely's test to improve hip mobility in functional activities like prolonged walking for community participation.   Status: [] Progressing: [] Met: [] Not Met: [] Adjusted  Pt will demo full flexibility on HS 90/90 test to improve hip mobility in functional activities like prolonged walking for community participation.                                                                                                               Status: [] Progressing: [] Met: [] Not Met: [] Adjusted    Overall Progression Towards Functional goals/ Treatment Progress Update:  [] Patient is progressing as expected towards functional goals listed.    [] Progression is slowed due to complexities/Impairments listed.  [] Progression has been slowed due to co-morbidities.  [x] Plan just implemented, too soon (<30days) to assess goals progression   [] Goals require adjustment due to lack of progress  [] Patient is not progressing as expected and requires additional follow up with physician  [] Other:     CHARGE CAPTURE     PT CHARGE GRID   CPT Code (TIMED) minutes # CPT Code (UNTIMED) #     Therex (61868)  25 2  EVAL:LOW (99487 - Typically 20 minutes face-to-face)     Neuromusc. Re-ed (91196) 5 0  Re-Eval (12937)     Manual (60896)    Estim Unattended (97130)     Ther. Act (12919)    Mech. Traction (47957)     Gait (44746)    Dry Needle 1-2 muscle (94720)     Aquatic Therex (22421)    Dry Needle 3+ muscle (20561)     Iontophoresis (64552)    VASO (69529)     Ultrasound (59776)    Group Therapy

## 2024-02-29 ENCOUNTER — HOSPITAL ENCOUNTER (OUTPATIENT)
Dept: PHYSICAL THERAPY | Age: 84
Setting detail: THERAPIES SERIES
Discharge: HOME | End: 2024-02-29
Payer: MEDICARE

## 2024-02-29 PROCEDURE — 97110 THERAPEUTIC EXERCISES: CPT

## 2024-02-29 NOTE — FLOWSHEET NOTE
re-injury.               Status: [] Progressing: [] Met: [] Not Met: [] Adjusted  Patient will have a decrease in pain to 3/10 with 6 min ambulation to help facilitate improvement in movement, function, and ADLs as indicated by functional deficits.              Status: [] Progressing: [] Met: [] Not Met: [] Adjusted     Long Term Goals: To be achieved in: 6 weeks  Patient will have a decrease in pain to 1/10 with 6 min ambulation to help facilitate improvement in movement, function, and ADLs as indicated by functional deficits.  Status: [] Progressing: [] Met: [] Not Met: [] Adjusted  Pt to improve strength to 4+/5 or better of proximal hipto allow for proper muscle and joint use in functional mobility, ADLs and prior level of function and to assist patient in tolerating ambulation on uneven surfaces, inclines, and prolonged distances.   Status: [] Progressing: [] Met: [] Not Met: [] Adjusted  Patient will demo full flexibility on Ely's test to improve hip mobility in functional activities like prolonged walking for community participation.   Status: [] Progressing: [] Met: [] Not Met: [] Adjusted  Pt will demo full flexibility on HS 90/90 test to improve hip mobility in functional activities like prolonged walking for community participation.                                                                                                               Status: [] Progressing: [] Met: [] Not Met: [] Adjusted    Overall Progression Towards Functional goals/ Treatment Progress Update:  [] Patient is progressing as expected towards functional goals listed.    [] Progression is slowed due to complexities/Impairments listed.  [] Progression has been slowed due to co-morbidities.  [x] Plan just implemented, too soon (<30days) to assess goals progression   [] Goals require adjustment due to lack of progress  [] Patient is not progressing as expected and requires additional follow up with physician  [] Other:     CHARGE

## 2024-03-06 DIAGNOSIS — N18.31 STAGE 3A CHRONIC KIDNEY DISEASE (HCC): ICD-10-CM

## 2024-03-07 ENCOUNTER — HOSPITAL ENCOUNTER (OUTPATIENT)
Dept: PHYSICAL THERAPY | Age: 84
Setting detail: THERAPIES SERIES
Discharge: HOME | End: 2024-03-07
Payer: MEDICARE

## 2024-03-07 LAB
ALBUMIN SERPL-MCNC: 3.7 G/DL (ref 3.4–5)
ANION GAP SERPL CALCULATED.3IONS-SCNC: 10 MMOL/L (ref 3–16)
BUN SERPL-MCNC: 18 MG/DL (ref 7–20)
CALCIUM SERPL-MCNC: 9.3 MG/DL (ref 8.3–10.6)
CHLORIDE SERPL-SCNC: 103 MMOL/L (ref 99–110)
CO2 SERPL-SCNC: 24 MMOL/L (ref 21–32)
CREAT SERPL-MCNC: 0.9 MG/DL (ref 0.6–1.2)
GFR SERPLBLD CREATININE-BSD FMLA CKD-EPI: >60 ML/MIN/{1.73_M2}
GLUCOSE SERPL-MCNC: 85 MG/DL (ref 70–99)
PHOSPHATE SERPL-MCNC: 2.6 MG/DL (ref 2.5–4.9)
POTASSIUM SERPL-SCNC: 4.5 MMOL/L (ref 3.5–5.1)
SODIUM SERPL-SCNC: 137 MMOL/L (ref 136–145)

## 2024-03-07 PROCEDURE — 97110 THERAPEUTIC EXERCISES: CPT

## 2024-03-07 PROCEDURE — 97112 NEUROMUSCULAR REEDUCATION: CPT

## 2024-03-07 NOTE — FLOWSHEET NOTE
[facility] - Outpatient Rehabilitation and Therapy [address], [place] [address] office: [phone] fax: [phone]      Physical Therapy: TREATMENT/PROGRESS NOTE   Patient: Анна Vivar (84 y.o. female)   Treatment Date: 2024   :  1940 MRN: [id number]   Visit #:    Insurance Allowable Auth Needed   [place] with $25 co-pay []Yes    [x]No    Insurance: Payor: [facility] MEDICARE / Plan: [facility] MEDICARE COMPLETE / Product Type: *No Product type* /   Insurance ID: [id number] - (Medicare Managed)  Secondary Insurance (if applicable):    Treatment Diagnosis:     ICD-10-CM    1. Impaired strength of hip muscles  R29.898       2. Gait difficulty  R26.9       3. Impaired flexibility of lower extremity  R29.898       4. 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]  Unsteady gait when walking [R26.81]  R HIP   Referring Physician: Felton Allen MD  PCP: Felton Allen MD                             Plan of care signed: YES    Date of Patient follow up with Physician:      Progress Report/POC: NO  POC update due: (10 visits /OR AUTH LIMITS, whichever is less)  3/15/2024     Precautions/ Contra-indications:                                                                                          Latex allergy:  NO  Pacemaker:    NO  Contraindications for Manipulation: osteoporosis   Date of Surgery: NA  Other: long covid SOB     Red Flags:  None     C-SSRS Triggered by Intake questionnaire:   [x] No, Questionnaire did not trigger screening.   [] Yes, Patient intake triggered further evaluation      [] C-SSRS Screening completed  [] PCP notified via Plan of Care  [] Emergency services notified     Preferred Language for Healthcare:   [x]English       []other:    SUBJECTIVE EXAMINATION     Patient Report/Comments: No pain currently, no pain in the past few days. HEP is going well, has been getting to them most days.

## 2024-03-14 ENCOUNTER — APPOINTMENT (OUTPATIENT)
Dept: PHYSICAL THERAPY | Age: 84
End: 2024-03-14
Payer: MEDICARE

## 2024-03-19 ENCOUNTER — APPOINTMENT (OUTPATIENT)
Dept: PHYSICAL THERAPY | Age: 84
End: 2024-03-19
Payer: MEDICARE

## 2024-03-20 ENCOUNTER — HOSPITAL ENCOUNTER (OUTPATIENT)
Dept: ULTRASOUND IMAGING | Age: 84
Discharge: HOME | End: 2024-03-20
Attending: UROLOGY
Payer: MEDICARE

## 2024-03-20 DIAGNOSIS — R10.2 PELVIC AND PERINEAL PAIN: ICD-10-CM

## 2024-03-20 DIAGNOSIS — N18.31 CHRONIC KIDNEY DISEASE, STAGE 3A (HCC): ICD-10-CM

## 2024-03-20 DIAGNOSIS — N28.89 OTHER SPECIFIED DISORDERS OF KIDNEY AND URETER: ICD-10-CM

## 2024-03-20 PROCEDURE — 76770 US EXAM ABDO BACK WALL COMP: CPT

## 2024-03-21 ENCOUNTER — HOSPITAL ENCOUNTER (OUTPATIENT)
Dept: PHYSICAL THERAPY | Age: 84
Setting detail: THERAPIES SERIES
Discharge: HOME | End: 2024-03-21
Payer: MEDICARE

## 2024-03-21 PROCEDURE — 97110 THERAPEUTIC EXERCISES: CPT

## 2024-03-21 PROCEDURE — 97112 NEUROMUSCULAR REEDUCATION: CPT

## 2024-03-21 NOTE — FLOWSHEET NOTE
Extension on Table  - 1 x daily - 7 x weekly - 2 sets - 10 reps  - Supine Bridge  - 1 x daily - 7 x weekly - 2 sets - 10 reps    Access Code: [email or web address]  URL: [email or web address]  Date: 02/14/2024  Prepared by: Shaila Sorenson     Exercises  - Seated Hamstring Stretch  - 2-3 x daily - 7 x weekly - 1 sets - 2 reps - 20-30 sec hold  - Seated Table Hamstring Stretch  - 2-3 x daily - 7 x weekly - 1 sets - 2 reps - 30 sec hold  - Standing Hamstring Stretch with Step  - 2-3 x daily - 7 x weekly - 1 sets - 2 reps - 20-30 sec hold  - Hip Flexor Stretch on Step  - 2-3 x daily - 7 x weekly - 1 sets - 2 reps - 20-30 sec hold  - Modified [place] Stretch  - 2-3 x daily - 7 x weekly - 1 sets - 2 reps - 20-30 sec hold    ASSESSMENT     Today's Assessment:  Progressed to more dynamic balance activity with EC.  Pt challenged but little physical assistance required as stepping strategies are strong.  HEP for posture and plantar fasciitis issued as posture and foot pain is effecting balance training.  Overall pt progressing well with increased intensity of exercises each session.      Medical Necessity Documentation:  I certify that this patient meets the below criteria necessary for medical necessity for care and/or justification of therapy services:  The patient has functional impairments and/or activity limitations and would benefit from continued outpatient therapy services to address the deficits outlined in the patients goals    Treatment/Activity Tolerance:  [x] Patient tolerated treatment well [] Patient limited by fatique  [] Patient limited by pain  [] Patient limited by other medical complications  [] Other:     Return to Play: NA    Prognosis for POC: [x] Good [] Fair  [] Poor    Patient requires continued skilled intervention: [x] Yes  [] No    GOALS     Patient stated goal: TO be able to walk without increased pain.   Status: [] Progressing: [] Met: [] Not Met: [] Adjusted     Therapist goals for Patient:   Short

## 2024-03-26 ENCOUNTER — HOSPITAL ENCOUNTER (OUTPATIENT)
Dept: PHYSICAL THERAPY | Age: 84
Setting detail: THERAPIES SERIES
Discharge: HOME | End: 2024-03-26
Payer: MEDICARE

## 2024-03-26 PROCEDURE — 97110 THERAPEUTIC EXERCISES: CPT

## 2024-03-26 PROCEDURE — 97530 THERAPEUTIC ACTIVITIES: CPT

## 2024-03-26 PROCEDURE — 97535 SELF CARE MNGMENT TRAINING: CPT

## 2024-03-26 NOTE — DISCHARGE SUMMARY
[facility] - Outpatient Rehabilitation and Therapy [address], [place] [address] office: [phone] fax: [phone]    Physical Therapy Re-Certification Plan of Care    Dear Felton Allen MD  ,    We had the pleasure of treating the following patient for physical therapy services at [facility]. A summary of our findings can be found in the updated assessment below.  This includes our plan of care.  If you have any questions or concerns regarding these findings, please do not hesitate to contact me at the office phone number checked above.  Thank you for the referral.     Physician Signature:________________________________Date:__________________  By signing above (or electronic signature), therapist's plan is approved by physician      Functional Outcome:     6 min walk: 1089 ft, 1 seated rest break, O2 sat 95% at end of trial. 0/10 pain afterward.       MMT L MMT R Notes         HIP Flexion 3+ 4      Abduction 4- 4      ER          IR          Extension 4+ 4+                 KNEE Flexion 4+ 4+      Extension 4 4+                    ANKLE DF 4+ 4+      PF     7 SL HR B    Inversion          Eversion         tandem EO on firm: 30+ seconds steady, tandem on firm with EC 3 second with imbalance, tandem on foam EO 17 sec L in front, 30 sec R in front with normal ankle strategies.     Overall Response to Treatment:   [x]Patient is responding well to treatment and improvement is noted with regards to goals   []Patient should continue to improve in reasonable time if they continue HEP   []Patient has plateaued and is no longer responding to skilled PT intervention    []Patient is getting worse and would benefit from return to referring MD   []Patient unable to adhere to initial POC   [x]Other: Pt's pain with ambulation is now resolved.  Pt also with a significant improvement in O2 sat post testing so may be using less accessory muscle for breathing and thus

## 2024-03-28 ENCOUNTER — APPOINTMENT (OUTPATIENT)
Dept: PHYSICAL THERAPY | Age: 84
End: 2024-03-28
Payer: MEDICARE

## 2024-03-28 ASSESSMENT — PATIENT HEALTH QUESTIONNAIRE - PHQ9
1. LITTLE INTEREST OR PLEASURE IN DOING THINGS: NOT AT ALL
SUM OF ALL RESPONSES TO PHQ QUESTIONS 1-9: 0
SUM OF ALL RESPONSES TO PHQ9 QUESTIONS 1 & 2: 0
2. FEELING DOWN, DEPRESSED OR HOPELESS: NOT AT ALL
SUM OF ALL RESPONSES TO PHQ9 QUESTIONS 1 & 2: 0
SUM OF ALL RESPONSES TO PHQ QUESTIONS 1-9: 0
1. LITTLE INTEREST OR PLEASURE IN DOING THINGS: NOT AT ALL
2. FEELING DOWN, DEPRESSED OR HOPELESS: NOT AT ALL

## 2024-03-29 ENCOUNTER — OFFICE VISIT (OUTPATIENT)
Dept: INTERNAL MEDICINE CLINIC | Age: 84
End: 2024-03-29

## 2024-03-29 VITALS
DIASTOLIC BLOOD PRESSURE: 90 MMHG | BODY MASS INDEX: 35.12 KG/M2 | WEIGHT: 192 LBS | OXYGEN SATURATION: 99 % | HEART RATE: 66 BPM | SYSTOLIC BLOOD PRESSURE: 132 MMHG

## 2024-03-29 DIAGNOSIS — J30.89 ENVIRONMENTAL AND SEASONAL ALLERGIES: ICD-10-CM

## 2024-03-29 DIAGNOSIS — Z87.81 HISTORY OF COMPRESSION FRACTURE OF SPINE: ICD-10-CM

## 2024-03-29 DIAGNOSIS — E03.9 ACQUIRED HYPOTHYROIDISM: ICD-10-CM

## 2024-03-29 DIAGNOSIS — I42.9 IDIOPATHIC CARDIOMYOPATHY (HCC): ICD-10-CM

## 2024-03-29 DIAGNOSIS — E66.01 SEVERE OBESITY (BMI 35.0-39.9) WITH COMORBIDITY (HCC): ICD-10-CM

## 2024-03-29 DIAGNOSIS — Z85.528 H/O RENAL CELL CARCINOMA: ICD-10-CM

## 2024-03-29 DIAGNOSIS — M15.9 PRIMARY OSTEOARTHRITIS INVOLVING MULTIPLE JOINTS: Primary | ICD-10-CM

## 2024-03-29 DIAGNOSIS — E21.3 HYPERPARATHYROIDISM (HCC): ICD-10-CM

## 2024-03-29 DIAGNOSIS — I50.42 CHRONIC COMBINED SYSTOLIC AND DIASTOLIC HEART FAILURE (HCC): ICD-10-CM

## 2024-03-29 PROBLEM — R26.81 UNSTEADY GAIT WHEN WALKING: Status: RESOLVED | Noted: 2024-01-25 | Resolved: 2024-03-29

## 2024-03-29 PROBLEM — R60.0 BILATERAL LEG EDEMA: Status: RESOLVED | Noted: 2019-07-25 | Resolved: 2024-03-29

## 2024-03-29 PROBLEM — I36.1 NONRHEUMATIC TRICUSPID VALVE REGURGITATION: Status: ACTIVE | Noted: 2023-10-17

## 2024-03-29 PROBLEM — L30.9 DERMATITIS OF FACE: Status: RESOLVED | Noted: 2023-03-29 | Resolved: 2024-03-29

## 2024-03-29 PROBLEM — I73.9 PAD (PERIPHERAL ARTERY DISEASE) (HCC): Status: RESOLVED | Noted: 2023-03-29 | Resolved: 2024-03-29

## 2024-03-29 RX ORDER — GABAPENTIN 100 MG/1
100 CAPSULE ORAL NIGHTLY
Qty: 90 CAPSULE | Refills: 1 | Status: SHIPPED | OUTPATIENT
Start: 2024-03-29 | End: 2024-09-25

## 2024-03-29 SDOH — ECONOMIC STABILITY: FOOD INSECURITY: WITHIN THE PAST 12 MONTHS, YOU WORRIED THAT YOUR FOOD WOULD RUN OUT BEFORE YOU GOT MONEY TO BUY MORE.: NEVER TRUE

## 2024-03-29 SDOH — ECONOMIC STABILITY: FOOD INSECURITY: WITHIN THE PAST 12 MONTHS, THE FOOD YOU BOUGHT JUST DIDN'T LAST AND YOU DIDN'T HAVE MONEY TO GET MORE.: NEVER TRUE

## 2024-03-29 SDOH — ECONOMIC STABILITY: INCOME INSECURITY: HOW HARD IS IT FOR YOU TO PAY FOR THE VERY BASICS LIKE FOOD, HOUSING, MEDICAL CARE, AND HEATING?: NOT HARD AT ALL

## 2024-03-29 ASSESSMENT — ENCOUNTER SYMPTOMS
SHORTNESS OF BREATH: 0
SORE THROAT: 0
VOMITING: 0
CONSTIPATION: 0
COLOR CHANGE: 0
COUGH: 0
BACK PAIN: 0
NAUSEA: 0
CHEST TIGHTNESS: 0
WHEEZING: 0
ABDOMINAL PAIN: 0

## 2024-03-29 NOTE — ASSESSMENT & PLAN NOTE
patient s/p remote left nephrectomy, s/p ablation procedure of lesion of right kidney.  Has been in remission, follows up with nephrology with stable kidney function

## 2024-03-29 NOTE — ASSESSMENT & PLAN NOTE
stable, cardiology added metoprolol, she could not tolerate amlodipine, encouraged recommendations for healthy diet and active lifestyle

## 2024-03-29 NOTE — ASSESSMENT & PLAN NOTE
recently discharged from physical therapy, nighttime gabapentin helping with symptoms, will refill and continue same, encouraged active lifestyle, daily stretches and core strengthening exercises as tolerated

## 2024-03-29 NOTE — PROGRESS NOTES
ASSESSMENT/PLAN:  1. Primary osteoarthritis involving multiple joints  Assessment & Plan:    recently discharged from physical therapy, nighttime gabapentin helping with symptoms, will refill and continue same, encouraged active lifestyle, daily stretches and core strengthening exercises as tolerated  Orders:  -     gabapentin (NEURONTIN) 100 MG capsule; Take 1 capsule by mouth nightly for 180 days., Disp-90 capsule, R-1Normal  2. Severe obesity (BMI 35.0-39.9) with comorbidity (HCC)  Assessment & Plan:    diet and exercise recommendations reinforced  3. Chronic combined systolic and diastolic heart failure (HCC)  Assessment & Plan:    stable, cardiology added metoprolol, she could not tolerate amlodipine, encouraged recommendations for healthy diet and active lifestyle  4. Idiopathic cardiomyopathy (HCC)  Assessment & Plan:    stable, continue care and recommendation as per cardiology  5. Hyperparathyroidism (HCC)  Assessment & Plan:    stable, follows up with nephrology regularly  6. Acquired hypothyroidism  Assessment & Plan:    stable, continue care and recommendation as per endocrinology  7. History of compression fracture of spine  Assessment & Plan:    up-to-date with DEXA scan, remains on daily calcium and vitamin D supplements  8. H/O renal cell carcinoma  Assessment & Plan:    patient s/p remote left nephrectomy, s/p ablation procedure of lesion of right kidney.  Has been in remission, follows up with nephrology with stable kidney function  9. Environmental and seasonal allergies  Assessment & Plan:    complaining of excessive sputum specially in the morning, advised to restart daily Claritin at least for the next 4 weeks until peak allergy season is better, continue Flonase, consider switching famotidine to omeprazole if no improvement with allergy medications.      Return in about 6 months (around 9/29/2024) for AWV.     SUBJECTIVE  HPI:   Here for 6 months f/u on chronic medical problems        Review

## 2024-03-29 NOTE — ASSESSMENT & PLAN NOTE
complaining of excessive sputum specially in the morning, advised to restart daily Claritin at least for the next 4 weeks until peak allergy season is better, continue Flonase, consider switching famotidine to omeprazole if no improvement with allergy medications.

## 2024-04-18 DIAGNOSIS — M81.0 AGE-RELATED OSTEOPOROSIS WITHOUT CURRENT PATHOLOGICAL FRACTURE: ICD-10-CM

## 2024-04-18 DIAGNOSIS — S32.050D CLOSED COMPRESSION FRACTURE OF L5 LUMBAR VERTEBRA, WITH ROUTINE HEALING, SUBSEQUENT ENCOUNTER: ICD-10-CM

## 2024-04-18 DIAGNOSIS — I42.9 IDIOPATHIC CARDIOMYOPATHY (HCC): ICD-10-CM

## 2024-04-18 DIAGNOSIS — I10 ESSENTIAL HYPERTENSION: ICD-10-CM

## 2024-04-18 DIAGNOSIS — R73.03 PREDIABETES: ICD-10-CM

## 2024-04-19 LAB
25(OH)D3 SERPL-MCNC: 49.2 NG/ML
ALBUMIN SERPL-MCNC: 4.1 G/DL (ref 3.4–5)
ALBUMIN/GLOB SERPL: 1.6 {RATIO} (ref 1.1–2.2)
ALP SERPL-CCNC: 108 U/L (ref 40–129)
ALT SERPL-CCNC: 17 U/L (ref 10–40)
ANION GAP SERPL CALCULATED.3IONS-SCNC: 9 MMOL/L (ref 3–16)
AST SERPL-CCNC: 21 U/L (ref 15–37)
BILIRUB SERPL-MCNC: 0.4 MG/DL (ref 0–1)
BUN SERPL-MCNC: 17 MG/DL (ref 7–20)
CALCIUM SERPL-MCNC: 9.7 MG/DL (ref 8.3–10.6)
CHLORIDE SERPL-SCNC: 103 MMOL/L (ref 99–110)
CO2 SERPL-SCNC: 27 MMOL/L (ref 21–32)
CREAT SERPL-MCNC: 0.9 MG/DL (ref 0.6–1.2)
GFR SERPLBLD CREATININE-BSD FMLA CKD-EPI: 63 ML/MIN/{1.73_M2}
GLUCOSE SERPL-MCNC: 91 MG/DL (ref 70–99)
PHOSPHATE SERPL-MCNC: 3.6 MG/DL (ref 2.5–4.9)
POTASSIUM SERPL-SCNC: 4.9 MMOL/L (ref 3.5–5.1)
PROT SERPL-MCNC: 6.6 G/DL (ref 6.4–8.2)
PTH-INTACT SERPL-MCNC: 48.5 PG/ML (ref 14–72)
SODIUM SERPL-SCNC: 139 MMOL/L (ref 136–145)
T4 FREE SERPL-MCNC: 0.9 NG/DL (ref 0.9–1.8)
TSH SERPL DL<=0.005 MIU/L-ACNC: 1.75 UIU/ML (ref 0.27–4.2)

## 2024-04-23 ENCOUNTER — NURSE ONLY (OUTPATIENT)
Dept: ENDOCRINOLOGY | Age: 84
End: 2024-04-23
Payer: MEDICARE

## 2024-04-23 ENCOUNTER — OFFICE VISIT (OUTPATIENT)
Dept: ENDOCRINOLOGY | Age: 84
End: 2024-04-23
Payer: MEDICARE

## 2024-04-23 VITALS
HEART RATE: 70 BPM | SYSTOLIC BLOOD PRESSURE: 133 MMHG | BODY MASS INDEX: 36.44 KG/M2 | RESPIRATION RATE: 16 BRPM | HEIGHT: 61 IN | WEIGHT: 193 LBS | DIASTOLIC BLOOD PRESSURE: 71 MMHG

## 2024-04-23 DIAGNOSIS — E21.3 HYPERPARATHYROIDISM (HCC): ICD-10-CM

## 2024-04-23 DIAGNOSIS — E03.9 ACQUIRED HYPOTHYROIDISM: Primary | ICD-10-CM

## 2024-04-23 DIAGNOSIS — M81.0 AGE-RELATED OSTEOPOROSIS WITHOUT CURRENT PATHOLOGICAL FRACTURE: ICD-10-CM

## 2024-04-23 DIAGNOSIS — E66.01 SEVERE OBESITY (BMI 35.0-39.9) WITH COMORBIDITY (HCC): Primary | ICD-10-CM

## 2024-04-23 PROCEDURE — G8427 DOCREV CUR MEDS BY ELIG CLIN: HCPCS | Performed by: INTERNAL MEDICINE

## 2024-04-23 PROCEDURE — G8399 PT W/DXA RESULTS DOCUMENT: HCPCS | Performed by: INTERNAL MEDICINE

## 2024-04-23 PROCEDURE — 96372 THER/PROPH/DIAG INJ SC/IM: CPT | Performed by: INTERNAL MEDICINE

## 2024-04-23 PROCEDURE — 3078F DIAST BP <80 MM HG: CPT | Performed by: INTERNAL MEDICINE

## 2024-04-23 PROCEDURE — G8417 CALC BMI ABV UP PARAM F/U: HCPCS | Performed by: INTERNAL MEDICINE

## 2024-04-23 PROCEDURE — 1036F TOBACCO NON-USER: CPT | Performed by: INTERNAL MEDICINE

## 2024-04-23 PROCEDURE — 1123F ACP DISCUSS/DSCN MKR DOCD: CPT | Performed by: INTERNAL MEDICINE

## 2024-04-23 PROCEDURE — 1090F PRES/ABSN URINE INCON ASSESS: CPT | Performed by: INTERNAL MEDICINE

## 2024-04-23 PROCEDURE — 99214 OFFICE O/P EST MOD 30 MIN: CPT | Performed by: INTERNAL MEDICINE

## 2024-04-23 PROCEDURE — 3075F SYST BP GE 130 - 139MM HG: CPT | Performed by: INTERNAL MEDICINE

## 2024-04-23 NOTE — PROGRESS NOTES
Patient presents for a prolia injection. Pt denies any prior adverse reactions. Prolia 60 mg given SubQ in the left arm with no complications. Patient tolerated well. Patient to return to office in 6 months for next injection.     Lot: [id number]  Exp: 8/31/26

## 2024-04-23 NOTE — PROGRESS NOTES
complications of disease if inadequately treated, side effects of medications, diagnosis, treatment options, and prognosis, risks, benefits, complications, and alternatives of treatment, labs, imaging and other studies and treatment targets and goals.  She understands instructions and counseling.    Return in about 6 months (around 10/23/2024).    Please note that some or all of this report was generated using voice recognition software. Please notify me in case of any questions about the content of this document, as some errors in transcription may have occurred .

## 2024-05-15 ENCOUNTER — OFFICE VISIT (OUTPATIENT)
Dept: INTERNAL MEDICINE CLINIC | Age: 84
End: 2024-05-15

## 2024-05-15 VITALS
WEIGHT: 192 LBS | OXYGEN SATURATION: 97 % | SYSTOLIC BLOOD PRESSURE: 132 MMHG | DIASTOLIC BLOOD PRESSURE: 68 MMHG | BODY MASS INDEX: 36.28 KG/M2 | HEART RATE: 75 BPM

## 2024-05-15 DIAGNOSIS — N90.89 VULVAR LESION: Primary | ICD-10-CM

## 2024-05-15 DIAGNOSIS — I10 ESSENTIAL HYPERTENSION: ICD-10-CM

## 2024-05-15 RX ORDER — CLOBETASOL PROPIONATE 0.5 MG/G
CREAM TOPICAL
Qty: 60 G | Refills: 0 | Status: SHIPPED | OUTPATIENT
Start: 2024-05-15

## 2024-05-15 ASSESSMENT — ENCOUNTER SYMPTOMS
ABDOMINAL PAIN: 0
CONSTIPATION: 0
SORE THROAT: 0
VOMITING: 0
CHEST TIGHTNESS: 0
COUGH: 0
COLOR CHANGE: 0
NAUSEA: 0
SHORTNESS OF BREATH: 0
WHEEZING: 0

## 2024-05-15 NOTE — PROGRESS NOTES
ASSESSMENT/PLAN:  1. Vulvar lesion  Assessment & Plan:  Small ulcerative lesion about 3 to 4 mm in diameter located left side labia minora, another lesion on the external genitalia.  Discussed with patient possibility of lichen planus however recommended further evaluation by gynecology as may eventually need biopsy if lesion does not improve with topical steroids.  Orders:  -     clobetasol (TEMOVATE) 0.05 % cream; Apply topically 2 times daily., Disp-60 g, R-0, Normal  -     [facility] Quin Eason MD, Gynecology, [place]  2. Essential hypertension  Assessment & Plan:    blood pressure stable and well-controlled on current dose of metoprolol, continue same and follow-up as scheduled      Return for as scheduled.     SUBJECTIVE  HPI:   Patient here complaining of painful spot on her external genitalia that she noticed first yesterday while wiping herself.  Reports she is not sexually active, denies any vaginal discharge or spotting.  She does use Estrace cream twice weekly that was recommended by urology to prevent recurrent UTI        Review of Systems   Constitutional:  Negative for activity change, appetite change and fatigue.   HENT:  Negative for congestion, hearing loss, mouth sores and sore throat.    Respiratory:  Negative for cough, chest tightness, shortness of breath and wheezing.    Cardiovascular:  Negative for chest pain, palpitations and leg swelling.   Gastrointestinal:  Negative for abdominal pain, constipation, nausea and vomiting.   Genitourinary:  Positive for vaginal pain. Negative for difficulty urinating, dysuria, frequency, hematuria, urgency, vaginal bleeding and vaginal discharge.   Musculoskeletal:  Negative for arthralgias, back pain, gait problem and joint swelling.   Skin:  Positive for wound. Negative for color change.   Allergic/Immunologic: Negative for environmental allergies and immunocompromised state.   Neurological:  Negative for dizziness, light-headedness and

## 2024-05-15 NOTE — ASSESSMENT & PLAN NOTE
Small ulcerative lesion about 3 to 4 mm in diameter located left side labia minora, another lesion on the external genitalia.  Discussed with patient possibility of lichen planus however recommended further evaluation by gynecology as may eventually need biopsy if lesion does not improve with topical steroids.

## 2024-05-15 NOTE — ASSESSMENT & PLAN NOTE
blood pressure stable and well-controlled on current dose of metoprolol, continue same and follow-up as scheduled

## 2024-05-16 ENCOUNTER — PATIENT MESSAGE (OUTPATIENT)
Dept: INTERNAL MEDICINE CLINIC | Age: 84
End: 2024-05-16

## 2024-05-16 NOTE — TELEPHONE ENCOUNTER
From: Анна Vivar  To: Dr. Felton Allen  Sent: 5/16/2024 10:43 AM EDT  Subject: Feeling Awful    Around 1:30 or so yesterday, after I was in your office, I began to feel awful. Started running a fever and had chills and felt terrible. Went to bed and slept for 2 1/2 hours. This morning, my temp might be up 1 point but I still feel just awful. I felt fine when I was there yesterday but I'm concerned I might have passed something on to you or others in the office. This is just a heads up.    Анна Vivar

## 2024-05-17 ENCOUNTER — OFFICE VISIT (OUTPATIENT)
Dept: INTERNAL MEDICINE CLINIC | Age: 84
End: 2024-05-17

## 2024-05-17 VITALS
DIASTOLIC BLOOD PRESSURE: 78 MMHG | OXYGEN SATURATION: 95 % | TEMPERATURE: 98.1 F | HEART RATE: 90 BPM | WEIGHT: 191.8 LBS | SYSTOLIC BLOOD PRESSURE: 136 MMHG | BODY MASS INDEX: 36.24 KG/M2

## 2024-05-17 DIAGNOSIS — L98.9 SKIN LESIONS: ICD-10-CM

## 2024-05-17 DIAGNOSIS — R52 GENERALIZED BODY ACHES: Primary | ICD-10-CM

## 2024-05-17 DIAGNOSIS — R52 GENERALIZED BODY ACHES: ICD-10-CM

## 2024-05-17 DIAGNOSIS — I10 ESSENTIAL HYPERTENSION: ICD-10-CM

## 2024-05-17 LAB
ALBUMIN SERPL-MCNC: 3.7 G/DL (ref 3.4–5)
ALBUMIN/GLOB SERPL: 1.3 {RATIO} (ref 1.1–2.2)
ALP SERPL-CCNC: 107 U/L (ref 40–129)
ALT SERPL-CCNC: 23 U/L (ref 10–40)
ANION GAP SERPL CALCULATED.3IONS-SCNC: 8 MMOL/L (ref 3–16)
AST SERPL-CCNC: 25 U/L (ref 15–37)
BILIRUB SERPL-MCNC: 0.3 MG/DL (ref 0–1)
BUN SERPL-MCNC: 13 MG/DL (ref 7–20)
CALCIUM SERPL-MCNC: 9 MG/DL (ref 8.3–10.6)
CHLORIDE SERPL-SCNC: 102 MMOL/L (ref 99–110)
CO2 SERPL-SCNC: 26 MMOL/L (ref 21–32)
CREAT SERPL-MCNC: 1 MG/DL (ref 0.6–1.2)
ERYTHROCYTE [SEDIMENTATION RATE] IN BLOOD BY WESTERGREN METHOD: 43 MM/HR (ref 0–30)
GFR SERPLBLD CREATININE-BSD FMLA CKD-EPI: 56 ML/MIN/{1.73_M2}
GLUCOSE SERPL-MCNC: 97 MG/DL (ref 70–99)
POTASSIUM SERPL-SCNC: 4.6 MMOL/L (ref 3.5–5.1)
PROT SERPL-MCNC: 6.6 G/DL (ref 6.4–8.2)
SODIUM SERPL-SCNC: 136 MMOL/L (ref 136–145)

## 2024-05-17 ASSESSMENT — ENCOUNTER SYMPTOMS
COUGH: 0
SORE THROAT: 0
CONSTIPATION: 0
CHEST TIGHTNESS: 0
COLOR CHANGE: 0
SHORTNESS OF BREATH: 0
NAUSEA: 0
BACK PAIN: 0
VOMITING: 0

## 2024-05-17 NOTE — PROGRESS NOTES
ASSESSMENT/PLAN:  1. Generalized body aches  Assessment & Plan:    no fever on today's exam no specific respiratory symptoms.  Not sure if current fatigue and achiness secondary to underlying viral etiology however advised patient will update labs, continue adequate fluid intake and hydration, continue Tylenol every 6 hours as needed.  She is scheduled to see gynecology on 20 June however will attempt to see if able to get in sooner  Orders:  -     Comprehensive Metabolic Panel; Future  -     CBC with Auto Differential; Future  -     Sedimentation Rate; Future  2. Skin lesions  Assessment & Plan:    patient just started the clobetasol cream yesterday, not able to see gynecology till June 20  Orders:  -     Comprehensive Metabolic Panel; Future  -     CBC with Auto Differential; Future  -     Sedimentation Rate; Future  3. Essential hypertension  Assessment & Plan:    blood pressure stable.      Return for as scheduled.     SUBJECTIVE  HPI:   Patient just seen in office recently for vulval lesion however she returns today complaining of head congestion and feeling she is about to come down with the flu.  She is denying any sore throat, denies any cough or nasal symptoms, denies any urinary symptoms, just feel like she is feverish and achy all over.  Reports she slept all day yesterday.  She does admit to also feeling a little bit depressed ever since she turned 84        Review of Systems   Constitutional:  Positive for fatigue. Negative for activity change and appetite change.   HENT:  Negative for congestion, hearing loss, mouth sores and sore throat.    Respiratory:  Negative for cough, chest tightness, shortness of breath and wheezing.    Cardiovascular:  Negative for chest pain, palpitations and leg swelling.   Gastrointestinal:  Negative for abdominal pain, constipation, nausea and vomiting.   Genitourinary:  Negative for difficulty urinating, dysuria, frequency, hematuria and urgency.   Musculoskeletal:

## 2024-05-17 NOTE — ASSESSMENT & PLAN NOTE
no fever on today's exam no specific respiratory symptoms.  Not sure if current fatigue and achiness secondary to underlying viral etiology however advised patient will update labs, continue adequate fluid intake and hydration, continue Tylenol every 6 hours as needed.  She is scheduled to see gynecology on 20 June however will attempt to see if able to get in sooner

## 2024-05-18 LAB
BASOPHILS # BLD: 0 K/UL (ref 0–0.2)
BASOPHILS NFR BLD: 0 %
DEPRECATED RDW RBC AUTO: 15.1 % (ref 12.4–15.4)
EOSINOPHIL # BLD: 0.2 K/UL (ref 0–0.6)
EOSINOPHIL NFR BLD: 2 %
HCT VFR BLD AUTO: 44.3 % (ref 36–48)
HGB BLD-MCNC: 14.7 G/DL (ref 12–16)
LYMPHOCYTES # BLD: 1.7 K/UL (ref 1–5.1)
LYMPHOCYTES NFR BLD: 18 %
MCH RBC QN AUTO: 30.1 PG (ref 26–34)
MCHC RBC AUTO-ENTMCNC: 33.2 G/DL (ref 31–36)
MCV RBC AUTO: 90.6 FL (ref 80–100)
MONOCYTES # BLD: 0.9 K/UL (ref 0–1.3)
MONOCYTES NFR BLD: 12 %
NEUTROPHILS # BLD: 4.9 K/UL (ref 1.7–7.7)
NEUTROPHILS NFR BLD: 62 %
NEUTS BAND NFR BLD MANUAL: 2 % (ref 0–7)
PLATELET # BLD AUTO: 137 K/UL (ref 135–450)
PMV BLD AUTO: 9.6 FL (ref 5–10.5)
RBC # BLD AUTO: 4.89 M/UL (ref 4–5.2)
RBC MORPH BLD: NORMAL
VARIANT LYMPHS NFR BLD MANUAL: 4 % (ref 0–6)
WBC # BLD AUTO: 7.6 K/UL (ref 4–11)

## 2024-05-20 ENCOUNTER — TELEPHONE (OUTPATIENT)
Dept: GYNECOLOGY | Age: 84
End: 2024-05-20

## 2024-05-20 DIAGNOSIS — I82.409 RECURRENT DEEP VEIN THROMBOSIS (DVT) (HCC): ICD-10-CM

## 2024-05-20 RX ORDER — RIVAROXABAN 10 MG/1
10 TABLET, FILM COATED ORAL DAILY
Qty: 100 TABLET | Refills: 2 | Status: SHIPPED | OUTPATIENT
Start: 2024-05-20

## 2024-05-20 NOTE — TELEPHONE ENCOUNTER
Patient is looking to schedule appointment to establish care for heavy discharge.     Patient can be reached at [phone]

## 2024-05-23 ENCOUNTER — HOSPITAL ENCOUNTER (EMERGENCY)
Age: 84
Discharge: HOME | End: 2024-05-23
Attending: EMERGENCY MEDICINE
Payer: MEDICARE

## 2024-05-23 VITALS
OXYGEN SATURATION: 94 % | BODY MASS INDEX: 36.06 KG/M2 | TEMPERATURE: 98.1 F | WEIGHT: 191 LBS | SYSTOLIC BLOOD PRESSURE: 146 MMHG | DIASTOLIC BLOOD PRESSURE: 76 MMHG | HEIGHT: 61 IN | HEART RATE: 79 BPM | RESPIRATION RATE: 16 BRPM

## 2024-05-23 DIAGNOSIS — B02.9 HERPES ZOSTER WITHOUT COMPLICATION: Primary | ICD-10-CM

## 2024-05-23 PROCEDURE — 99283 EMERGENCY DEPT VISIT LOW MDM: CPT

## 2024-05-23 RX ORDER — DOCUSATE SODIUM 100 MG/1
100 CAPSULE, LIQUID FILLED ORAL 2 TIMES DAILY
Qty: 30 CAPSULE | Refills: 0 | Status: SHIPPED | OUTPATIENT
Start: 2024-05-23

## 2024-05-23 RX ORDER — VALACYCLOVIR HYDROCHLORIDE 1 G/1
1000 TABLET, FILM COATED ORAL 2 TIMES DAILY
Qty: 14 TABLET | Refills: 0 | Status: SHIPPED | OUTPATIENT
Start: 2024-05-23 | End: 2024-05-30

## 2024-05-23 RX ORDER — ONDANSETRON 4 MG/1
4 TABLET, ORALLY DISINTEGRATING ORAL 3 TIMES DAILY PRN
Qty: 21 TABLET | Refills: 0 | Status: SHIPPED | OUTPATIENT
Start: 2024-05-23

## 2024-05-23 RX ORDER — HYDROCODONE BITARTRATE AND ACETAMINOPHEN 5; 325 MG/1; MG/1
1 TABLET ORAL EVERY 6 HOURS PRN
Qty: 12 TABLET | Refills: 0 | Status: SHIPPED | OUTPATIENT
Start: 2024-05-23 | End: 2024-05-26

## 2024-05-23 ASSESSMENT — ENCOUNTER SYMPTOMS
CHEST TIGHTNESS: 0
DIARRHEA: 0
BACK PAIN: 0
COUGH: 0
ABDOMINAL PAIN: 0
COLOR CHANGE: 0
NAUSEA: 0
SHORTNESS OF BREATH: 0
RESPIRATORY NEGATIVE: 1
VOMITING: 0
CONSTIPATION: 0

## 2024-05-23 ASSESSMENT — PAIN DESCRIPTION - LOCATION: LOCATION: GROIN

## 2024-05-23 ASSESSMENT — PAIN SCALES - GENERAL: PAINLEVEL_OUTOF10: 5

## 2024-05-23 ASSESSMENT — PAIN DESCRIPTION - DESCRIPTORS: DESCRIPTORS: ACHING

## 2024-05-23 NOTE — ED PROVIDER NOTES
I have personally performed a face to face diagnostic evaluation on this patient. I have fully participated in the care of this patient I personally saw the patient and performed a substantive portion of the visit including all aspects of the medical decision making.  I have reviewed and agree with all pertinent clinical information including history, physical exam, diagnostic tests, and the plan.      HPI: Анна Vivar presented with rash to her right groin area that is now spreading up her right buttock.  Patient saw her primary care doctor and OB/GYN.  Symptoms are worsening.  Was initially told that it was potentially lichen planus then potential cellulitis.  Chief Complaint   Patient presents with    Rash     Pt came to the hospital due to having an rash that started in her groin and has gone down her R leg, saw her PCP and was given a steroid cream, and has not had relief.      Review of Systems: See JADE note  Vital Signs: BP (!) 146/76   Pulse 79   Temp 98.1 °F (36.7 °C) (Oral)   Resp 16   Ht 1.549 m (5' 1\")   Wt 86.6 kg (191 lb)   SpO2 94%   BMI 36.09 kg/m²     Alert 84 y.o. female who does not appear toxic or acutely ill  HENT: Atraumatic  Neck: Grossly normal ROM  Chest/Lungs: respiratory effort normal   : Chaperone present, vesicular rash from right labia up the right buttock, does not cross midline  Musculoskeletal: Grossly normal ROM  Skin: No palor or diaphoresis    Medical Decision Making and Plan:  Pertinent Labs & Imaging studies reviewed. (See JADE chart for details)  I agree with JADE assessment and plan.  84-year-old female presents for right groin rash rating up her right buttock.  At this time consistent with likely vesicular rash, zoster.  Afebrile not tachycardic saturating well on room air.  Symptoms been present for 9 days.  Patient has no signs of lung or brain involvement.  No signs of meningitis or encephalitis.  Will give patient symptomatic treatment.  Follow-up with primary 
 Follow-up with PCP.  Return to ED if any worsening symptoms.  Do not believe further workup or imaging indicated.       I am the Primary Clinician of Record.  FINAL IMPRESSION      1. Herpes zoster without complication          DISPOSITION/PLAN     DISPOSITION Decision To Discharge 05/23/2024 12:26:04 PM      PATIENT REFERRED TO:  Felton Allen MD  [address] [facility] [address]  [phone]    Schedule an appointment as soon as possible for a visit   For a Re-check in   3-5   days.    [facility] Emergency Department  [address]  [facility] [address]  [phone]  Go to   As needed, If symptoms worsen      DISCHARGE MEDICATIONS:  Discharge Medication List as of 5/23/2024 12:29 PM        START taking these medications    Details   HYDROcodone-acetaminophen (NORCO) 5-325 MG per tablet Take 1 tablet by mouth every 6 hours as needed for Pain for up to 3 days. Max Daily Amount: 4 tablets, Disp-12 tablet, R-0Normal      ondansetron (ZOFRAN-ODT) 4 MG disintegrating tablet Take 1 tablet by mouth 3 times daily as needed for Nausea or Vomiting, Disp-21 tablet, R-0Normal      docusate sodium (COLACE) 100 MG capsule Take 1 capsule by mouth 2 times daily, Disp-30 capsule, R-0Normal      valACYclovir (VALTREX) 1 g tablet Take 1 tablet by mouth 2 times daily for 7 days, Disp-14 tablet, R-0Normal             DISCONTINUED MEDICATIONS:  Discharge Medication List as of 5/23/2024 12:29 PM                 (Please note that portions of this note were completed with a voice recognition program.  Efforts were made to edit the dictations but occasionally words are mis-transcribed.)    ESTRELLA Cuevas (electronically signed)       Fortunato Velasquez PA  05/23/24 [id number]

## 2024-05-30 ENCOUNTER — TELEPHONE (OUTPATIENT)
Dept: FAMILY MEDICINE CLINIC | Age: 84
End: 2024-05-30

## 2024-06-01 NOTE — PROGRESS NOTES
MAGNESIUM PO Take 250 mg by mouth daily       hydrocortisone (ANUSOL-HC) 2.5 % rectal cream as needed      loratadine (CLARITIN) 10 MG tablet Take 1 tablet by mouth as needed      fluticasone (FLONASE) 50 MCG/ACT nasal spray 1 spray by Nasal route as needed       No current facility-administered medications for this visit.     Allergies:  Allergies   Allergen Reactions    Bupropion Other (See Comments)     hallucinations    Fosamax [Alendronate] Other (See Comments)     Indigestion, abdominal pain     Social History:  Social History     Tobacco Use    Smoking status: Former     Current packs/day: 0.00     Average packs/day: 2.0 packs/day for 19.0 years (38.0 ttl pk-yrs)     Types: Cigarettes     Start date: 1963     Quit date: 1982     Years since quittin.1    Smokeless tobacco: Never    Tobacco comments:     2 packs only 6 years   Vaping Use    Vaping Use: Never used   Substance Use Topics    Alcohol use: Not Currently     Alcohol/week: 3.0 - 4.0 standard drinks of alcohol     Types: 3 - 4 Glasses of wine per week     Comment: Average; a glass of wine on special occasions    Drug use: No        ROS and PHYSICAL:   ROS:  10 point ROS otherwise negative except as mentioned in the HPI    VITALS:  Vitals:    24 1407   BP: 122/78   Pulse: 76   Temp: 97.6 °F (36.4 °C)   SpO2: 97%   Weight: 85.7 kg (189 lb)      Body mass index is 35.71 kg/m².    PHYSICAL EXAM:  GENERAL: NAD, alert and oriented  HEENT: Head: NC/AT. Eyes: clear conjunctiva.   SKIN: Skin color, texture, and turgor normal. No rash  PSYCH: Normal affect and speech    ASSESSMENT & PLAN:     84 y.o. female presents to the office to establish care.    1. Encounter to establish care    2. Herpes zoster without complication  - Already on gabapentin and has finished Valtrex given by ER. She does not like taking Norco so has only been taking Tylenol. Will see if Medrol dosepack will offer any more improvement in her symptoms. Otherwise rash

## 2024-06-02 PROBLEM — R52 GENERALIZED BODY ACHES: Status: RESOLVED | Noted: 2024-05-17 | Resolved: 2024-06-02

## 2024-06-02 PROBLEM — N90.89 VULVAR LESION: Status: RESOLVED | Noted: 2024-05-15 | Resolved: 2024-06-02

## 2024-06-02 PROBLEM — R06.09 DOE (DYSPNEA ON EXERTION): Status: RESOLVED | Noted: 2022-08-15 | Resolved: 2024-06-02

## 2024-06-02 PROBLEM — L98.9 SKIN LESIONS: Status: RESOLVED | Noted: 2024-05-17 | Resolved: 2024-06-02

## 2024-06-02 SDOH — HEALTH STABILITY: PHYSICAL HEALTH: ON AVERAGE, HOW MANY DAYS PER WEEK DO YOU ENGAGE IN MODERATE TO STRENUOUS EXERCISE (LIKE A BRISK WALK)?: 1 DAY

## 2024-06-04 ENCOUNTER — OFFICE VISIT (OUTPATIENT)
Dept: FAMILY MEDICINE CLINIC | Age: 84
End: 2024-06-04

## 2024-06-04 VITALS
DIASTOLIC BLOOD PRESSURE: 78 MMHG | SYSTOLIC BLOOD PRESSURE: 122 MMHG | OXYGEN SATURATION: 97 % | WEIGHT: 189 LBS | HEART RATE: 76 BPM | BODY MASS INDEX: 35.71 KG/M2 | TEMPERATURE: 97.6 F

## 2024-06-04 DIAGNOSIS — B02.9 HERPES ZOSTER WITHOUT COMPLICATION: ICD-10-CM

## 2024-06-04 DIAGNOSIS — M81.0 AGE-RELATED OSTEOPOROSIS WITHOUT CURRENT PATHOLOGICAL FRACTURE: ICD-10-CM

## 2024-06-04 DIAGNOSIS — I50.42 CHRONIC COMBINED SYSTOLIC AND DIASTOLIC HEART FAILURE (HCC): ICD-10-CM

## 2024-06-04 DIAGNOSIS — R73.03 PREDIABETES: ICD-10-CM

## 2024-06-04 DIAGNOSIS — E21.3 HYPERPARATHYROIDISM (HCC): ICD-10-CM

## 2024-06-04 DIAGNOSIS — Z76.89 ENCOUNTER TO ESTABLISH CARE: Primary | ICD-10-CM

## 2024-06-04 DIAGNOSIS — I10 ESSENTIAL HYPERTENSION: ICD-10-CM

## 2024-06-04 DIAGNOSIS — N18.31 STAGE 3A CHRONIC KIDNEY DISEASE (HCC): ICD-10-CM

## 2024-06-04 DIAGNOSIS — I42.9 IDIOPATHIC CARDIOMYOPATHY (HCC): ICD-10-CM

## 2024-06-04 DIAGNOSIS — Z79.01 CHRONIC ANTICOAGULATION: ICD-10-CM

## 2024-06-04 DIAGNOSIS — Z90.5 S/P NEPHRECTOMY: ICD-10-CM

## 2024-06-04 DIAGNOSIS — E03.9 ACQUIRED HYPOTHYROIDISM: ICD-10-CM

## 2024-06-04 DIAGNOSIS — Z85.528 H/O RENAL CELL CARCINOMA: ICD-10-CM

## 2024-06-04 RX ORDER — METHYLPREDNISOLONE 4 MG/1
TABLET ORAL
Qty: 1 KIT | Refills: 0 | Status: SHIPPED | OUTPATIENT
Start: 2024-06-04 | End: 2024-06-10

## 2024-06-18 ENCOUNTER — HOSPITAL ENCOUNTER (OUTPATIENT)
Dept: GENERAL RADIOLOGY | Age: 84
Discharge: HOME | End: 2024-06-18
Attending: INTERNAL MEDICINE
Payer: MEDICARE

## 2024-06-18 DIAGNOSIS — R73.03 PREDIABETES: ICD-10-CM

## 2024-06-18 DIAGNOSIS — I10 ESSENTIAL HYPERTENSION: ICD-10-CM

## 2024-06-18 DIAGNOSIS — I42.9 IDIOPATHIC CARDIOMYOPATHY (HCC): ICD-10-CM

## 2024-06-18 DIAGNOSIS — S32.050D CLOSED COMPRESSION FRACTURE OF L5 LUMBAR VERTEBRA, WITH ROUTINE HEALING, SUBSEQUENT ENCOUNTER: ICD-10-CM

## 2024-06-18 DIAGNOSIS — M81.0 AGE-RELATED OSTEOPOROSIS WITHOUT CURRENT PATHOLOGICAL FRACTURE: ICD-10-CM

## 2024-06-18 PROCEDURE — 77080 DXA BONE DENSITY AXIAL: CPT

## 2024-07-02 ENCOUNTER — OFFICE VISIT (OUTPATIENT)
Dept: FAMILY MEDICINE CLINIC | Age: 84
End: 2024-07-02

## 2024-07-02 VITALS
WEIGHT: 191 LBS | BODY MASS INDEX: 36.09 KG/M2 | DIASTOLIC BLOOD PRESSURE: 74 MMHG | HEART RATE: 79 BPM | SYSTOLIC BLOOD PRESSURE: 116 MMHG | OXYGEN SATURATION: 95 % | TEMPERATURE: 97.6 F

## 2024-07-02 DIAGNOSIS — B02.29 POST HERPETIC NEURALGIA: Primary | ICD-10-CM

## 2024-07-02 DIAGNOSIS — B37.31 VULVAR CANDIDIASIS: ICD-10-CM

## 2024-07-02 RX ORDER — NYSTATIN 100000 U/G
CREAM TOPICAL
Qty: 30 G | Refills: 0 | Status: SHIPPED | OUTPATIENT
Start: 2024-07-02

## 2024-07-02 RX ORDER — TRAMADOL HYDROCHLORIDE 50 MG/1
50 TABLET ORAL EVERY 6 HOURS PRN
Qty: 28 TABLET | Refills: 0 | Status: SHIPPED | OUTPATIENT
Start: 2024-07-02 | End: 2024-07-09

## 2024-07-02 NOTE — PROGRESS NOTES
Office Note  2024  Patient Name: Анна Vivar  MRN: [id number] : 1940    SUBJECTIVE:     CHIEF COMPLAINT:  Chief Complaint   Patient presents with    Herpes Zoster     Patient is having nerve pain in vaginal area and some in the buttock area.        HISTORY OF PRESENT ILLNESS:  She presents today with the following concerns:    Now 7 weeks post-shingles. Rash has resolved, but still having a significant amount of pain. Shooting, burning pain from front of leg to buttock. Is definitely better than it was, but two nights ago she was unable to fall asleep until 5AM because the pain was so bad. Vulva is also significantly red/burning. She is wondering whether the lichen planus she was treated for previously is actually the cause of this pain. She is unable to take higher doses of gabapentin due to side effects.    Past Medical History:  Past Medical History:   Diagnosis Date    Acquired hypothyroidism 2018    Mood swings with synthroid, but not with [place]    Adenomatous polyp of colon 2012    Partial colectomy  for recurrent sessile adenoma    Age-related osteoporosis without current pathological fracture 2018    Anticoagulant long-term use     Aortic valve insufficiency 10/29/2015    Dr. Vito Padron    Chronic anticoagulation 2018    Recurrent DVT, xarelto    Chronic back pain     Chronic combined systolic and diastolic heart failure (HCC) 2019    Chronic kidney disease     Chronic pain     Closed compression fracture of L5 lumbar vertebra, with routine healing, subsequent encounter 1976    Essential hypertension 2016    Fx ankle     GERD (gastroesophageal reflux disease) 2011    Dilated x 2, Dr. Vernon. Intolerant pantoprazole (burning in throat)    H/O renal cell carcinoma     left, Dr. Adams    Hearing loss     History of DVT (deep vein thrombosis) 2005, 2018     x3.  left leg, following fracture, 2nd was right leg, unprovoked, 3rd left

## 2024-07-17 ENCOUNTER — TELEPHONE (OUTPATIENT)
Dept: ENDOCRINOLOGY | Age: 84
End: 2024-07-17

## 2024-07-18 NOTE — TELEPHONE ENCOUNTER
[employer] [place] [employer] approved.      Your Authorization Request Has Been Approved  Your authorization request number is [id number].   Authorization Status  Approved  Authorization Number  [id number]      Authorization Start Date  09-  Authorization End Date  09-    If this requires a response please respond to the pool ( P [facility] MEDICATION PRE-AUTH).      Thank you please advise patient.

## 2024-08-13 ENCOUNTER — OFFICE VISIT (OUTPATIENT)
Dept: FAMILY MEDICINE CLINIC | Age: 84
End: 2024-08-13
Payer: MEDICARE

## 2024-08-13 VITALS
SYSTOLIC BLOOD PRESSURE: 112 MMHG | DIASTOLIC BLOOD PRESSURE: 66 MMHG | OXYGEN SATURATION: 91 % | TEMPERATURE: 98.7 F | HEART RATE: 87 BPM

## 2024-08-13 DIAGNOSIS — N90.89 VULVAR IRRITATION: ICD-10-CM

## 2024-08-13 DIAGNOSIS — R11.0 NAUSEA: Primary | ICD-10-CM

## 2024-08-13 PROCEDURE — G8427 DOCREV CUR MEDS BY ELIG CLIN: HCPCS | Performed by: STUDENT IN AN ORGANIZED HEALTH CARE EDUCATION/TRAINING PROGRAM

## 2024-08-13 PROCEDURE — G8417 CALC BMI ABV UP PARAM F/U: HCPCS | Performed by: STUDENT IN AN ORGANIZED HEALTH CARE EDUCATION/TRAINING PROGRAM

## 2024-08-13 PROCEDURE — 1123F ACP DISCUSS/DSCN MKR DOCD: CPT | Performed by: STUDENT IN AN ORGANIZED HEALTH CARE EDUCATION/TRAINING PROGRAM

## 2024-08-13 PROCEDURE — 3074F SYST BP LT 130 MM HG: CPT | Performed by: STUDENT IN AN ORGANIZED HEALTH CARE EDUCATION/TRAINING PROGRAM

## 2024-08-13 PROCEDURE — 1036F TOBACCO NON-USER: CPT | Performed by: STUDENT IN AN ORGANIZED HEALTH CARE EDUCATION/TRAINING PROGRAM

## 2024-08-13 PROCEDURE — 99213 OFFICE O/P EST LOW 20 MIN: CPT | Performed by: STUDENT IN AN ORGANIZED HEALTH CARE EDUCATION/TRAINING PROGRAM

## 2024-08-13 PROCEDURE — 1090F PRES/ABSN URINE INCON ASSESS: CPT | Performed by: STUDENT IN AN ORGANIZED HEALTH CARE EDUCATION/TRAINING PROGRAM

## 2024-08-13 PROCEDURE — 3078F DIAST BP <80 MM HG: CPT | Performed by: STUDENT IN AN ORGANIZED HEALTH CARE EDUCATION/TRAINING PROGRAM

## 2024-08-13 PROCEDURE — G8399 PT W/DXA RESULTS DOCUMENT: HCPCS | Performed by: STUDENT IN AN ORGANIZED HEALTH CARE EDUCATION/TRAINING PROGRAM

## 2024-08-13 RX ORDER — PROMETHAZINE HYDROCHLORIDE 12.5 MG/1
12.5-25 TABLET ORAL EVERY 8 HOURS PRN
Qty: 30 TABLET | Refills: 0 | Status: SHIPPED | OUTPATIENT
Start: 2024-08-13 | End: 2024-09-12

## 2024-08-13 NOTE — PROGRESS NOTES
Office Note  2024  Patient Name: Анна Vivar  MRN: [id number] : 1940    SUBJECTIVE:     CHIEF COMPLAINT:  Chief Complaint   Patient presents with    GI Problem     Nausea, cramping, constipation, patient states that she has been able to make bm since  but still nauseous        HISTORY OF PRESENT ILLNESS:  She presents today with the following concerns:    Nausea/stomach cramps started last Monday. Ate cake from [employer]. Tuesday had Tilapia. Thursday/Saturday took Miralax. Didn't help. Fleet enema on  which finally helped. Nausea has persisted.    Still having vulvar irritation from shingles. Would like dermatology referral.    Past Medical History:  Past Medical History:   Diagnosis Date    Acquired hypothyroidism 2018    Mood swings with synthroid, but not with [place]    Adenomatous polyp of colon 2012    Partial colectomy  for recurrent sessile adenoma    Age-related osteoporosis without current pathological fracture 2018    Anticoagulant long-term use     Aortic valve insufficiency 10/29/2015    Dr. Vito Padron    Chronic anticoagulation 2018    Recurrent DVT, xarelto    Chronic back pain     Chronic combined systolic and diastolic heart failure (HCC) 2019    Chronic kidney disease     Chronic pain     Closed compression fracture of L5 lumbar vertebra, with routine healing, subsequent encounter 1976    Essential hypertension 2016    Fx ankle     GERD (gastroesophageal reflux disease) 2011    Dilated x 2, Dr. Vernon. Intolerant pantoprazole (burning in throat)    H/O renal cell carcinoma     left, Dr. Adams    Hearing loss     History of DVT (deep vein thrombosis) 2005, 2018     x3.  left leg, following fracture, 2nd was right leg, unprovoked, 3rd left 2016    Idiopathic cardiomyopathy (HCC) 2019    Insomnia 2018    Intermittent. Prior PCP txd with lorazepam 1 mg, uses 1/4    Kidney lesion right    s/p

## 2024-08-23 ENCOUNTER — PATIENT MESSAGE (OUTPATIENT)
Dept: FAMILY MEDICINE CLINIC | Age: 84
End: 2024-08-23

## 2024-08-23 DIAGNOSIS — R10.84 GENERALIZED ABDOMINAL PAIN: ICD-10-CM

## 2024-08-23 DIAGNOSIS — R10.84 GENERALIZED ABDOMINAL PAIN: Primary | ICD-10-CM

## 2024-08-23 LAB
ALBUMIN SERPL-MCNC: 3.7 G/DL (ref 3.4–5)
ALBUMIN/GLOB SERPL: 1.4 {RATIO} (ref 1.1–2.2)
ALP SERPL-CCNC: 80 U/L (ref 40–129)
ALT SERPL-CCNC: 16 U/L (ref 10–40)
ANION GAP SERPL CALCULATED.3IONS-SCNC: 9 MMOL/L (ref 3–16)
AST SERPL-CCNC: 20 U/L (ref 15–37)
BILIRUB SERPL-MCNC: <0.2 MG/DL (ref 0–1)
BUN SERPL-MCNC: 16 MG/DL (ref 7–20)
CALCIUM SERPL-MCNC: 8.8 MG/DL (ref 8.3–10.6)
CHLORIDE SERPL-SCNC: 103 MMOL/L (ref 99–110)
CO2 SERPL-SCNC: 26 MMOL/L (ref 21–32)
CREAT SERPL-MCNC: 0.8 MG/DL (ref 0.6–1.2)
DEPRECATED RDW RBC AUTO: 14.6 % (ref 12.4–15.4)
GFR SERPLBLD CREATININE-BSD FMLA CKD-EPI: 73 ML/MIN/{1.73_M2}
GLUCOSE SERPL-MCNC: 84 MG/DL (ref 70–99)
HCT VFR BLD AUTO: 41.9 % (ref 36–48)
HGB BLD-MCNC: 14.1 G/DL (ref 12–16)
LIPASE SERPL-CCNC: 42 U/L (ref 13–60)
MCH RBC QN AUTO: 31.4 PG (ref 26–34)
MCHC RBC AUTO-ENTMCNC: 33.6 G/DL (ref 31–36)
MCV RBC AUTO: 93.3 FL (ref 80–100)
PLATELET # BLD AUTO: 158 K/UL (ref 135–450)
PMV BLD AUTO: 10.2 FL (ref 5–10.5)
POTASSIUM SERPL-SCNC: 4.8 MMOL/L (ref 3.5–5.1)
PROT SERPL-MCNC: 6.3 G/DL (ref 6.4–8.2)
RBC # BLD AUTO: 4.49 M/UL (ref 4–5.2)
SODIUM SERPL-SCNC: 138 MMOL/L (ref 136–145)
WBC # BLD AUTO: 6.8 K/UL (ref 4–11)

## 2024-08-23 RX ORDER — DICYCLOMINE HYDROCHLORIDE 10 MG/1
10 CAPSULE ORAL
Qty: 90 CAPSULE | Refills: 0 | Status: SHIPPED | OUTPATIENT
Start: 2024-08-23

## 2024-09-11 LAB
ALBUMIN: 3.6 G/DL
ALP BLD-CCNC: 78 U/L
ALT SERPL-CCNC: 19 U/L
ANION GAP SERPL CALCULATED.3IONS-SCNC: 12 MMOL/L
AST SERPL-CCNC: 22 U/L
BILIRUB SERPL-MCNC: 0.4 MG/DL (ref 0.1–1.4)
BUN BLDV-MCNC: 13 MG/DL
CALCIUM SERPL-MCNC: 9.8 MG/DL
CHLORIDE BLD-SCNC: 106 MMOL/L
CO2: 20 MMOL/L
CREAT SERPL-MCNC: 0.8 MG/DL
GFR, ESTIMATED: 53
GLUCOSE BLD-MCNC: 90 MG/DL
POTASSIUM SERPL-SCNC: 4.4 MMOL/L
SODIUM BLD-SCNC: 138 MMOL/L
TOTAL PROTEIN: 7.1 G/DL (ref 6.4–8.2)

## 2024-09-12 LAB
B-TYPE NATRIURETIC PEPTIDE: 1786 PG/ML
BASOPHILS ABSOLUTE: 0 /ΜL
BASOPHILS RELATIVE PERCENT: 0.1 %
CHOLESTEROL, TOTAL: 179 MG/DL
CHOLESTEROL/HDL RATIO: ABNORMAL
EOSINOPHILS ABSOLUTE: 0.2 /ΜL
EOSINOPHILS RELATIVE PERCENT: 2.7 %
ESTIMATED AVERAGE GLUCOSE: NORMAL
FERRITIN: 96 NG/ML (ref 9–150)
HBA1C MFR BLD: 5.5 %
HCT VFR BLD CALC: 41.9 % (ref 36–46)
HDLC SERPL-MCNC: 74 MG/DL (ref 35–70)
HEMOGLOBIN: 13.4 G/DL (ref 12–16)
LDL CHOLESTEROL: 76
LYMPHOCYTES ABSOLUTE: 2.1 /ΜL
LYMPHOCYTES RELATIVE PERCENT: 27.9 %
MCH RBC QN AUTO: 30 PG
MCHC RBC AUTO-ENTMCNC: 32 G/DL
MCV RBC AUTO: 93.9 FL
MONOCYTES ABSOLUTE: 1.1 /ΜL
MONOCYTES RELATIVE PERCENT: 14.3 %
NEUTROPHILS ABSOLUTE: 4.1 /ΜL
NEUTROPHILS RELATIVE PERCENT: 54.9 %
NONHDLC SERPL-MCNC: 105 MG/DL
PLATELET # BLD: 144 K/ΜL
PMV BLD AUTO: 11.1 FL
RBC # BLD: 4.46 10^6/ΜL
TRIGL SERPL-MCNC: 143 MG/DL
TSH SERPL DL<=0.05 MIU/L-ACNC: 1.44 UIU/ML
VLDLC SERPL CALC-MCNC: ABNORMAL MG/DL
WBC # BLD: 7.24 10^3/ML

## 2024-09-19 ENCOUNTER — PATIENT MESSAGE (OUTPATIENT)
Dept: ENDOCRINOLOGY | Age: 84
End: 2024-09-19

## 2024-09-19 DIAGNOSIS — E03.9 ACQUIRED HYPOTHYROIDISM: ICD-10-CM

## 2024-09-19 RX ORDER — THYROID 60 MG
TABLET ORAL
Qty: 90 TABLET | Refills: 0 | Status: SHIPPED | OUTPATIENT
Start: 2024-09-19

## 2024-09-19 RX ORDER — THYROID,PORK 15 MG
TABLET ORAL
Qty: 90 TABLET | Refills: 0 | Status: SHIPPED | OUTPATIENT
Start: 2024-09-19

## 2024-09-24 SDOH — HEALTH STABILITY: PHYSICAL HEALTH: ON AVERAGE, HOW MANY DAYS PER WEEK DO YOU ENGAGE IN MODERATE TO STRENUOUS EXERCISE (LIKE A BRISK WALK)?: 0 DAYS

## 2024-09-24 ASSESSMENT — PATIENT HEALTH QUESTIONNAIRE - PHQ9
SUM OF ALL RESPONSES TO PHQ QUESTIONS 1-9: 0
SUM OF ALL RESPONSES TO PHQ9 QUESTIONS 1 & 2: 0
2. FEELING DOWN, DEPRESSED OR HOPELESS: NOT AT ALL
SUM OF ALL RESPONSES TO PHQ QUESTIONS 1-9: 0
SUM OF ALL RESPONSES TO PHQ QUESTIONS 1-9: 0
1. LITTLE INTEREST OR PLEASURE IN DOING THINGS: NOT AT ALL
SUM OF ALL RESPONSES TO PHQ QUESTIONS 1-9: 0

## 2024-09-24 ASSESSMENT — LIFESTYLE VARIABLES
HOW MANY STANDARD DRINKS CONTAINING ALCOHOL DO YOU HAVE ON A TYPICAL DAY: PATIENT DOES NOT DRINK
HOW MANY STANDARD DRINKS CONTAINING ALCOHOL DO YOU HAVE ON A TYPICAL DAY: 0
HOW OFTEN DO YOU HAVE A DRINK CONTAINING ALCOHOL: NEVER
HOW OFTEN DO YOU HAVE A DRINK CONTAINING ALCOHOL: 1
HOW OFTEN DO YOU HAVE SIX OR MORE DRINKS ON ONE OCCASION: 1

## 2024-09-25 DIAGNOSIS — E03.9 ACQUIRED HYPOTHYROIDISM: ICD-10-CM

## 2024-09-26 RX ORDER — THYROID 60 MG
TABLET ORAL
Qty: 90 TABLET | Refills: 1 | Status: SHIPPED | OUTPATIENT
Start: 2024-09-26

## 2024-09-26 RX ORDER — THYROID,PORK 15 MG
TABLET ORAL
Qty: 90 TABLET | Refills: 1 | Status: SHIPPED | OUTPATIENT
Start: 2024-09-26

## 2024-09-27 ENCOUNTER — OFFICE VISIT (OUTPATIENT)
Dept: FAMILY MEDICINE CLINIC | Age: 84
End: 2024-09-27

## 2024-09-27 VITALS
TEMPERATURE: 98.7 F | DIASTOLIC BLOOD PRESSURE: 72 MMHG | SYSTOLIC BLOOD PRESSURE: 128 MMHG | WEIGHT: 188 LBS | OXYGEN SATURATION: 96 % | HEART RATE: 81 BPM | BODY MASS INDEX: 35.52 KG/M2

## 2024-09-27 DIAGNOSIS — M15.9 PRIMARY OSTEOARTHRITIS INVOLVING MULTIPLE JOINTS: ICD-10-CM

## 2024-09-27 DIAGNOSIS — K21.9 GASTROESOPHAGEAL REFLUX DISEASE WITHOUT ESOPHAGITIS: ICD-10-CM

## 2024-09-27 DIAGNOSIS — I50.42 CHRONIC COMBINED SYSTOLIC AND DIASTOLIC HEART FAILURE (HCC): ICD-10-CM

## 2024-09-27 DIAGNOSIS — Z23 NEED FOR VACCINATION: ICD-10-CM

## 2024-09-27 DIAGNOSIS — Z09 HOSPITAL DISCHARGE FOLLOW-UP: ICD-10-CM

## 2024-09-27 DIAGNOSIS — N18.31 STAGE 3A CHRONIC KIDNEY DISEASE (HCC): ICD-10-CM

## 2024-09-27 DIAGNOSIS — Z00.00 MEDICARE ANNUAL WELLNESS VISIT, SUBSEQUENT: Primary | ICD-10-CM

## 2024-09-27 RX ORDER — GABAPENTIN 100 MG/1
100 CAPSULE ORAL NIGHTLY
Qty: 90 CAPSULE | Refills: 1 | Status: SHIPPED | OUTPATIENT
Start: 2024-09-27 | End: 2025-03-26

## 2024-09-27 RX ORDER — CLOTRIMAZOLE AND BETAMETHASONE DIPROPIONATE 10; .64 MG/G; MG/G
CREAM TOPICAL PRN
Qty: 45 G | Refills: 3 | Status: SHIPPED | OUTPATIENT
Start: 2024-09-27

## 2024-09-30 ENCOUNTER — TELEPHONE (OUTPATIENT)
Dept: ENDOCRINOLOGY | Age: 84
End: 2024-09-30

## 2024-09-30 NOTE — TELEPHONE ENCOUNTER
Fax from [employer] [facility]    Approval for [place] thyroid 15mg tab assistance through 12/31/24    You will receive a supply of medication within 7-10 days

## 2024-10-10 SDOH — HEALTH STABILITY: PHYSICAL HEALTH: ON AVERAGE, HOW MANY DAYS PER WEEK DO YOU ENGAGE IN MODERATE TO STRENUOUS EXERCISE (LIKE A BRISK WALK)?: 0 DAYS

## 2024-10-11 ENCOUNTER — OFFICE VISIT (OUTPATIENT)
Dept: ORTHOPEDIC SURGERY | Age: 84
End: 2024-10-11
Payer: MEDICARE

## 2024-10-11 VITALS — BODY MASS INDEX: 35.5 KG/M2 | WEIGHT: 188 LBS | HEIGHT: 61 IN | RESPIRATION RATE: 16 BRPM

## 2024-10-11 DIAGNOSIS — M17.0 ARTHRITIS OF BOTH KNEES: Primary | ICD-10-CM

## 2024-10-11 DIAGNOSIS — S80.00XS CONTUSION OF KNEE, UNSPECIFIED LATERALITY, SEQUELA: ICD-10-CM

## 2024-10-11 PROCEDURE — 1036F TOBACCO NON-USER: CPT | Performed by: ORTHOPAEDIC SURGERY

## 2024-10-11 PROCEDURE — G8417 CALC BMI ABV UP PARAM F/U: HCPCS | Performed by: ORTHOPAEDIC SURGERY

## 2024-10-11 PROCEDURE — 99204 OFFICE O/P NEW MOD 45 MIN: CPT | Performed by: ORTHOPAEDIC SURGERY

## 2024-10-11 PROCEDURE — 1123F ACP DISCUSS/DSCN MKR DOCD: CPT | Performed by: ORTHOPAEDIC SURGERY

## 2024-10-11 PROCEDURE — 1090F PRES/ABSN URINE INCON ASSESS: CPT | Performed by: ORTHOPAEDIC SURGERY

## 2024-10-11 PROCEDURE — G8427 DOCREV CUR MEDS BY ELIG CLIN: HCPCS | Performed by: ORTHOPAEDIC SURGERY

## 2024-10-11 PROCEDURE — G8399 PT W/DXA RESULTS DOCUMENT: HCPCS | Performed by: ORTHOPAEDIC SURGERY

## 2024-10-11 PROCEDURE — G8482 FLU IMMUNIZE ORDER/ADMIN: HCPCS | Performed by: ORTHOPAEDIC SURGERY

## 2024-10-11 NOTE — PROGRESS NOTES
Not on file   Social Connections: Not on file   Intimate Partner Violence: Patient Declined (9/12/2024)    Received from [facility]    Humiliation, Afraid, Rape, and Kick questionnaire     Fear of Current or Ex-Partner: Patient declined     Emotionally Abused: Patient declined     Physically Abused: Patient declined     Sexually Abused: Patient declined   Housing Stability: Unknown (3/29/2024)    Housing Stability Vital Sign     Unable to Pay for Housing in the Last Year: Not on file     Number of Places Lived in the Last Year: Not on file     Unstable Housing in the Last Year: No        Vitals:    10/11/24 1015   Resp: 16   Weight: 85.3 kg (188 lb)   Height: 1.549 m (5' 1\")       Physical Exam  Constitutional - well-groomed, well-nourished, Body mass index is 35.52 kg/m².  Psychiatric - pleasant, normal mood & affect  Cardiovascular - posterior tibialis pulse 2+, positive peripheral edema BLE  Respiratory - respirations unlabored, on room air  Skin - no rashes, wounds, or lesions seen on exposed skin  Neurological - BLE SILT SP/DP/T/sural/saphenous nerve distributions; EHL/FHL/TA/GS intact  Bilateral knees:   neutral alignment    effusion noted   No obvious atrophy     Overlying skin intact, however there is some resolving bruising/ecchymosis over the left anterior knee and right proximal leg   Range of Motion:    Extension:  5    Flexion:  110   crepitus with ROM   Pain with patellar grind test   Ligamentous testing:    Varus stress stable    Valgus stress stable           Imaging:  Images were personally reviewed by myself and discussed with the patient  Right knee 4 views performed 10/11/24 -   Overall alignment is neutral.    The medial compartment articular height is preserved.  The lateral compartment articular height is preserved.  The anterior compartment articular height is severely narrowed with large osteophytes seen.    No obvious fractures are seen.      Left knee 4 views performed 10/11/24 -

## 2024-10-18 DIAGNOSIS — M81.0 AGE-RELATED OSTEOPOROSIS WITHOUT CURRENT PATHOLOGICAL FRACTURE: ICD-10-CM

## 2024-10-18 DIAGNOSIS — E21.3 HYPERPARATHYROIDISM (HCC): ICD-10-CM

## 2024-10-18 DIAGNOSIS — E03.9 ACQUIRED HYPOTHYROIDISM: ICD-10-CM

## 2024-10-18 LAB
25(OH)D3 SERPL-MCNC: 46.6 NG/ML
PTH-INTACT SERPL-MCNC: 53 PG/ML (ref 14–72)

## 2024-10-19 LAB
ALBUMIN SERPL-MCNC: 3.8 G/DL (ref 3.4–5)
ALBUMIN/GLOB SERPL: 1.5 {RATIO} (ref 1.1–2.2)
ALP SERPL-CCNC: 90 U/L (ref 40–129)
ALT SERPL-CCNC: 22 U/L (ref 10–40)
ANION GAP SERPL CALCULATED.3IONS-SCNC: 10 MMOL/L (ref 3–16)
AST SERPL-CCNC: 25 U/L (ref 15–37)
BILIRUB SERPL-MCNC: <0.2 MG/DL (ref 0–1)
BUN SERPL-MCNC: 18 MG/DL (ref 7–20)
CALCIUM SERPL-MCNC: 9.8 MG/DL (ref 8.3–10.6)
CHLORIDE SERPL-SCNC: 104 MMOL/L (ref 99–110)
CO2 SERPL-SCNC: 25 MMOL/L (ref 21–32)
CREAT SERPL-MCNC: 0.9 MG/DL (ref 0.6–1.2)
GFR SERPLBLD CREATININE-BSD FMLA CKD-EPI: 63 ML/MIN/{1.73_M2}
GLUCOSE SERPL-MCNC: 76 MG/DL (ref 70–99)
PHOSPHATE SERPL-MCNC: 3 MG/DL (ref 2.5–4.9)
POTASSIUM SERPL-SCNC: 4.2 MMOL/L (ref 3.5–5.1)
PROT SERPL-MCNC: 6.4 G/DL (ref 6.4–8.2)
SODIUM SERPL-SCNC: 139 MMOL/L (ref 136–145)
TSH SERPL DL<=0.005 MIU/L-ACNC: 2.47 UIU/ML (ref 0.27–4.2)

## 2024-10-24 ENCOUNTER — NURSE ONLY (OUTPATIENT)
Dept: ENDOCRINOLOGY | Age: 84
End: 2024-10-24

## 2024-10-24 ENCOUNTER — TELEPHONE (OUTPATIENT)
Dept: ENDOCRINOLOGY | Age: 84
End: 2024-10-24

## 2024-10-24 ENCOUNTER — OFFICE VISIT (OUTPATIENT)
Dept: ENDOCRINOLOGY | Age: 84
End: 2024-10-24

## 2024-10-24 VITALS
RESPIRATION RATE: 14 BRPM | HEIGHT: 61 IN | TEMPERATURE: 98 F | DIASTOLIC BLOOD PRESSURE: 66 MMHG | WEIGHT: 190 LBS | BODY MASS INDEX: 35.87 KG/M2 | SYSTOLIC BLOOD PRESSURE: 128 MMHG | HEART RATE: 68 BPM

## 2024-10-24 DIAGNOSIS — E21.3 HYPERPARATHYROIDISM (HCC): ICD-10-CM

## 2024-10-24 DIAGNOSIS — E03.9 ACQUIRED HYPOTHYROIDISM: ICD-10-CM

## 2024-10-24 DIAGNOSIS — I50.42 CHRONIC COMBINED SYSTOLIC AND DIASTOLIC HEART FAILURE (HCC): ICD-10-CM

## 2024-10-24 DIAGNOSIS — M81.0 AGE-RELATED OSTEOPOROSIS WITHOUT CURRENT PATHOLOGICAL FRACTURE: Primary | ICD-10-CM

## 2024-10-24 NOTE — PROGRESS NOTES
Анна Vivar is a 84 y.o. female seen for for hypothyroidism, secondary hyperparathyroidism and osteoporosis.   Patient was diagnosed with Hypothyroidism approximately May 1998 at the time of diagnosis she was having ringing in her ears and her workup showed a goiter which led to her diagnosis   Current complaints: denies fatigue, weight changes, heat/cold intolerance, bowel/skin changes or CVS symptoms. She recalls her thyroid fx test were slightly abnormal   History of obstructive symptoms: difficulty swallowing No, changes in voice/hoarseness No.    History of radiation to patient's neck: NO  Recent iodine exposure: No  Family history includes no thyroid abnormalities.  Family history of thyroid cancer: No    She has Esophageal stricture and Hiatal Hernia and also has GERD and is on Zantac   She takes 100 mg of gabapentin at night for hip pain   She has been diagnosed with Osteopenia and takes calcium and Vitamin D   Her last DEXA was in 11/2017 with DR Corea and she is noted to have osteopenia   She had renal cell carcinoma and is s/p left nephrectomy she follows with Urologist   She has hx of DVT and is on Xarelto     Patient started taking 90 mg of Hersey Thyroid on May 23, 2019.  Previously she was on approximately 88 mcg of Nature-Throid.  She started having palpitations and of July and was noted to have a TSH of 0.03.  She had also joined weight watchers in June and had lost 13 pounds she has been taking her medication regularly.  When she was having the episode of palpitation she was advised to reduce the dose to half a tablet of 90 mg she feels tired and fatigued again.    She has lost 30  lbs from  June 2019 to jan 2020.  Osteoporosis diagnosed in in aug 2020 and started on Prolia by PCP   She started taking calcium citrate twice daily since   March 2023 and Now PTH has normalized     INTERIM     She had a fall but did not fracture   She denies any fractures she denies any kidney stones        Past

## 2024-10-24 NOTE — TELEPHONE ENCOUNTER
Hollis [employer] [place] ROSA approved for Buy and Bill    Your Authorization Request Has Been Approved    Your authorization request number is [id number].      Authorization Start Date 10-     Authorization End Date 10-     If this requires a response please respond to the pool ( P MHCX PSC MEDICATION PRE-AUTH).      Thank you please advise patient.

## 2024-11-01 DIAGNOSIS — K21.9 GASTROESOPHAGEAL REFLUX DISEASE WITHOUT ESOPHAGITIS: ICD-10-CM

## 2024-11-01 RX ORDER — FAMOTIDINE 20 MG/1
TABLET, FILM COATED ORAL
Qty: 90 TABLET | Refills: 3 | OUTPATIENT
Start: 2024-11-01

## 2024-11-04 ENCOUNTER — PATIENT MESSAGE (OUTPATIENT)
Dept: FAMILY MEDICINE CLINIC | Age: 84
End: 2024-11-04

## 2024-11-04 DIAGNOSIS — K21.9 GASTROESOPHAGEAL REFLUX DISEASE WITHOUT ESOPHAGITIS: ICD-10-CM

## 2024-11-04 RX ORDER — FAMOTIDINE 20 MG/1
20 TABLET, FILM COATED ORAL EVERY EVENING
Qty: 90 TABLET | Refills: 3 | Status: SHIPPED | OUTPATIENT
Start: 2024-11-04

## 2024-11-04 NOTE — TELEPHONE ENCOUNTER
Medication:   Requested Prescriptions     Pending Prescriptions Disp Refills    famotidine (PEPCID) 20 MG tablet 90 tablet 3     Sig: Take 1 tablet by mouth every evening      Last Filled:      Patient Phone Number: [phone] (home)     Last appt: 9/27/2024   Next appt: 3/28/2025    Last OARRS:        No data to display              PDMP Monitoring:    Last PDMP John as Reviewed ([place]):  Review User Review Instant Review Result   HORTENSIA BOJORQUEZ 9/27/2024  1:43 PM Reviewed PDMP [1]     Preferred Pharmacy:   [facility]/pharmacy #[id number] - [place], [place] - [address] [phone] - F [phone]  [address] [facility] [address]  Phone: [phone] Fax: [phone]

## 2024-12-05 ENCOUNTER — TELEPHONE (OUTPATIENT)
Dept: ENDOCRINOLOGY | Age: 84
End: 2024-12-05

## 2024-12-16 ENCOUNTER — PATIENT MESSAGE (OUTPATIENT)
Dept: FAMILY MEDICINE CLINIC | Age: 84
End: 2024-12-16

## 2024-12-16 DIAGNOSIS — I82.409 RECURRENT DEEP VEIN THROMBOSIS (DVT) (HCC): ICD-10-CM

## 2024-12-16 RX ORDER — RIVAROXABAN 10 MG/1
10 TABLET, FILM COATED ORAL DAILY
Qty: 90 TABLET | Refills: 3 | OUTPATIENT
Start: 2024-12-16

## 2024-12-17 RX ORDER — RIVAROXABAN 10 MG/1
10 TABLET, FILM COATED ORAL DAILY
Qty: 100 TABLET | Refills: 2 | Status: SHIPPED | OUTPATIENT
Start: 2024-12-17

## 2024-12-17 RX ORDER — RIVAROXABAN 10 MG/1
10 TABLET, FILM COATED ORAL
Qty: 100 TABLET | Refills: 0 | Status: SHIPPED | OUTPATIENT
Start: 2024-12-17

## 2024-12-17 NOTE — TELEPHONE ENCOUNTER
Medication:   Requested Prescriptions     Pending Prescriptions Disp Refills    rivaroxaban (XARELTO) 10 MG TABS tablet 100 tablet 2     Sig: Take 1 tablet by mouth daily      Last Filled:      Patient Phone Number: [phone] (home)     Last appt: 9/27/2024   Next appt: 3/28/2025    Last OARRS:        No data to display              PDMP Monitoring:    Last PDMP John as Reviewed ([place]):  Review User Review Instant Review Result   HORTENSIA BOJORQUEZ 9/27/2024  1:43 PM Reviewed PDMP [1]     Preferred Pharmacy:     [id number] - [place], [place] - [address] [place] [address] [phone] - F [phone]  [address] [place] [address]  Phone: [phone] Fax: [phone]

## 2025-01-14 ENCOUNTER — PATIENT MESSAGE (OUTPATIENT)
Dept: FAMILY MEDICINE CLINIC | Age: 85
End: 2025-01-14

## 2025-01-14 DIAGNOSIS — H91.90 HEARING LOSS, UNSPECIFIED HEARING LOSS TYPE, UNSPECIFIED LATERALITY: Primary | ICD-10-CM

## 2025-01-14 DIAGNOSIS — R06.02 SOB (SHORTNESS OF BREATH): ICD-10-CM

## 2025-01-22 ENCOUNTER — TELEPHONE (OUTPATIENT)
Dept: PULMONOLOGY | Age: 85
End: 2025-01-22

## 2025-01-22 ENCOUNTER — OFFICE VISIT (OUTPATIENT)
Dept: PULMONOLOGY | Age: 85
End: 2025-01-22
Payer: MEDICARE

## 2025-01-22 VITALS
HEART RATE: 76 BPM | OXYGEN SATURATION: 95 % | WEIGHT: 190 LBS | BODY MASS INDEX: 37.3 KG/M2 | DIASTOLIC BLOOD PRESSURE: 70 MMHG | HEIGHT: 60 IN | SYSTOLIC BLOOD PRESSURE: 112 MMHG

## 2025-01-22 DIAGNOSIS — R06.09 DOE (DYSPNEA ON EXERTION): Primary | ICD-10-CM

## 2025-01-22 PROCEDURE — 1159F MED LIST DOCD IN RCRD: CPT | Performed by: INTERNAL MEDICINE

## 2025-01-22 PROCEDURE — 3078F DIAST BP <80 MM HG: CPT | Performed by: INTERNAL MEDICINE

## 2025-01-22 PROCEDURE — 3074F SYST BP LT 130 MM HG: CPT | Performed by: INTERNAL MEDICINE

## 2025-01-22 PROCEDURE — 1123F ACP DISCUSS/DSCN MKR DOCD: CPT | Performed by: INTERNAL MEDICINE

## 2025-01-22 PROCEDURE — 99204 OFFICE O/P NEW MOD 45 MIN: CPT | Performed by: INTERNAL MEDICINE

## 2025-01-22 PROCEDURE — 1160F RVW MEDS BY RX/DR IN RCRD: CPT | Performed by: INTERNAL MEDICINE

## 2025-01-22 RX ORDER — BUDESONIDE AND FORMOTEROL FUMARATE DIHYDRATE 160; 4.5 UG/1; UG/1
2 AEROSOL RESPIRATORY (INHALATION) 2 TIMES DAILY
Qty: 10.2 G | Refills: 5 | Status: SHIPPED | OUTPATIENT
Start: 2025-01-22

## 2025-01-22 NOTE — TELEPHONE ENCOUNTER
PT called and said Symbicort is to expensive told pt to call [facility] to see what is covered under her formulary they were unable to provide information. PT would like something else to be called in.    PH: [phone]    [id number] [place] [place] - [address] [phone] - F [phone]

## 2025-01-22 NOTE — TELEPHONE ENCOUNTER
Spoke to pt and she stated she will call [facility] and ask them which inhalers are covered under her formulary and she will call us back and let us know. I told her we can send another in but we won't know its cost until it is sent and at the pharmacy for them to run through her insurance.

## 2025-01-22 NOTE — PROGRESS NOTES
PULMONARY OFFICE NEW PATIENT VISIT    CONSULTING PHYSICIAN:      REASON FOR VISIT:   Chief Complaint   Patient presents with    Shortness of Breath     Stated she has had SOB for about 2 years. Stated it has been worse than it is today but doesn't go away. Stated it worsened after the Moderna vaccine in 2020. Stated she experienced a lot of extreme fatigue also after that vaccine. Was told not to get the second one by her PCP.       DATE OF VISIT: 1/22/2025    HISTORY OF PRESENT ILLNESS: 84 y.o. year old female with past medical history of congestive heart failure, CKD stage IIIa, ALEXI on CPAP, history of DVT on anticoagulation is here for evaluation of shortness of breath.    Patient has been noticing dyspnea on exertion over the last 3 to 4 years.  She gets short of breath upon walking long distances and climbing a flight of stairs.  She does get tired after performing daily activities of her life and has to take rest.  She denies any cough but does have feeling of constant clearing her throat along with sinus issues and postnasal drip.  No chest pain or hemoptysis or wheezing.    She has congestive heart failure with preserved EF.  Follows with cardiologist at [facility] and has recently undergone extensive testing that did not show any abnormality.    She has sleep apnea and uses CPAP with nasal pillows and follows with sleep physician at [facility].    She has history of DVT in lower extremities and is on Xarelto for the last 10 years      DIAGNOSTIC TEST REVIEWED:  Patient had PFT performed in August 2017 that showed reduced diffusion capacity of 72%.    Echo from 9/12/2024 at [facility] showed EF of 53%.  Left bundle branch block.  Moderate aortic regurgitation.      REVIEW OF SYSTEMS:   CONSTITUTIONAL SYMPTOMS: The patient denies fever, fatigue, night sweats, weight loss or weight gain.   HEENT: No vision changes. No tinnitus, Denies sinus pain. No hoarseness, or dysphagia.   NECK: Patient denies

## 2025-01-27 ENCOUNTER — PATIENT MESSAGE (OUTPATIENT)
Dept: PULMONOLOGY | Age: 85
End: 2025-01-27

## 2025-01-29 ENCOUNTER — PROCEDURE VISIT (OUTPATIENT)
Age: 85
End: 2025-01-29
Payer: MEDICARE

## 2025-01-29 ENCOUNTER — OFFICE VISIT (OUTPATIENT)
Age: 85
End: 2025-01-29
Payer: MEDICARE

## 2025-01-29 VITALS
BODY MASS INDEX: 37.35 KG/M2 | TEMPERATURE: 94.6 F | HEART RATE: 80 BPM | WEIGHT: 190.26 LBS | OXYGEN SATURATION: 95 % | SYSTOLIC BLOOD PRESSURE: 120 MMHG | DIASTOLIC BLOOD PRESSURE: 74 MMHG | HEIGHT: 60 IN

## 2025-01-29 DIAGNOSIS — R06.09 DOE (DYSPNEA ON EXERTION): Primary | ICD-10-CM

## 2025-01-29 DIAGNOSIS — H61.23 BILATERAL IMPACTED CERUMEN: ICD-10-CM

## 2025-01-29 DIAGNOSIS — H93.13 TINNITUS OF BOTH EARS: ICD-10-CM

## 2025-01-29 DIAGNOSIS — H90.3 SENSORINEURAL HEARING LOSS (SNHL) OF BOTH EARS: Primary | ICD-10-CM

## 2025-01-29 DIAGNOSIS — H93.293 AUDITORY DISCRIMINATION IMPAIRMENT, BILATERAL: ICD-10-CM

## 2025-01-29 PROCEDURE — 1160F RVW MEDS BY RX/DR IN RCRD: CPT | Performed by: OTOLARYNGOLOGY

## 2025-01-29 PROCEDURE — 1159F MED LIST DOCD IN RCRD: CPT | Performed by: OTOLARYNGOLOGY

## 2025-01-29 PROCEDURE — 99204 OFFICE O/P NEW MOD 45 MIN: CPT | Performed by: OTOLARYNGOLOGY

## 2025-01-29 PROCEDURE — 92567 TYMPANOMETRY: CPT

## 2025-01-29 PROCEDURE — 1123F ACP DISCUSS/DSCN MKR DOCD: CPT | Performed by: OTOLARYNGOLOGY

## 2025-01-29 PROCEDURE — 3078F DIAST BP <80 MM HG: CPT | Performed by: OTOLARYNGOLOGY

## 2025-01-29 PROCEDURE — G0268 REMOVAL OF IMPACTED WAX MD: HCPCS | Performed by: OTOLARYNGOLOGY

## 2025-01-29 PROCEDURE — 92557 COMPREHENSIVE HEARING TEST: CPT

## 2025-01-29 PROCEDURE — 3074F SYST BP LT 130 MM HG: CPT | Performed by: OTOLARYNGOLOGY

## 2025-01-29 NOTE — PROGRESS NOTES
Procedure Laterality Date    APPENDECTOMY      BREAST BIOPSY       SECTION      x3    COLONOSCOPY N/A 2021    COLONOSCOPY DIAGNOSTIC performed by Coral Vernon MD at [facility] ENDOSCOPY    ENDOSCOPY, COLON, DIAGNOSTIC      KIDNEY SURGERY Right 2016    cryoablation procedure    RIGHT COLECTOMY Right 2012    lap, reurrent sessile adenoma    SUBTOTAL COLECTOMY      TOTAL NEPHRECTOMY Left     CA    TUBAL LIGATION      UPPER GASTROINTESTINAL ENDOSCOPY N/A 2022    EGD BIOPSY performed by Krishna Hernandez MD at [facility] ENDOSCOPY    UPPER GASTROINTESTINAL ENDOSCOPY N/A 2022    EGD DILATION BALLOON performed by Krishna Hernandez MD at [facility] ENDOSCOPY     FAMILY HISTORY: Family history reviewed.  Except as noted in history of present illness, there is no pertinent family history      REVIEW OF SYSTEMS:  All pertinent positive and negative review of systems included in HPI.  Otherwise, all systems are reviewed and negative.    PHYSICAL EXAMINATION:   GENERAL: wdwn- no acute distress  RESPIRATORY:  No stridor or respiratory distress  COMMUNICATION :  Normal voice  MENTAL STATUS:  Mood and affect normal, oriented X 3  HEAD AND FACE:  No abnormalities of the skin of face or head  EXTERNAL EARS AND NOSE:  Normal pinnae bilateral  FACIAL MUSCLES:  All branches of facial nerve intact  EXTRAOCULAR MUSCLES: Intact with full range of motion  FACE PALPATION:  No tenderness over sinuses.  Zygomatic arches and orbital rims intact  OTOSCOPY: Cerumen occludes both external auditory canals, right greater than left.  Cerumen removed from both external auditory canals with curettes.  Both tympanic membranes and middle ear spaces are normal.  TUNING FORKS: Rinne ++ Perez midline at 512 Hz  INTRANASAL:  Septum midline, turbinates normal, meati clear.  LIPS, TEETH, GINGIVA:  Normal mucosa  PHARYNX:  Normal  NECK:  No masses.  LYMPHATIC:  No cervical adenopathy  SALIVARY GLANDS:  No swelling or masses in

## 2025-01-30 RX ORDER — BUDESONIDE AND FORMOTEROL FUMARATE DIHYDRATE 160; 4.5 UG/1; UG/1
2 AEROSOL RESPIRATORY (INHALATION) 2 TIMES DAILY
Qty: 10.2 G | Refills: 3 | Status: SHIPPED | OUTPATIENT
Start: 2025-01-30

## 2025-01-30 NOTE — TELEPHONE ENCOUNTER
Dr. Diaz, Patient states Dulera is too expensive.  She has a discount card for Symbicort.  She is requesting the Symbicort to be reordered.      I have pended the Symbicort for you to sign if you feel that it is appropriate switching the inhaler.

## 2025-02-05 ENCOUNTER — HOSPITAL ENCOUNTER (OUTPATIENT)
Dept: PULMONOLOGY | Age: 85
Discharge: HOME | End: 2025-02-05
Attending: INTERNAL MEDICINE
Payer: MEDICARE

## 2025-02-05 ENCOUNTER — HOSPITAL ENCOUNTER (OUTPATIENT)
Dept: WOMENS IMAGING | Age: 85
Discharge: HOME | End: 2025-02-05
Payer: MEDICARE

## 2025-02-05 VITALS — BODY MASS INDEX: 34.04 KG/M2 | WEIGHT: 185 LBS | HEIGHT: 62 IN

## 2025-02-05 DIAGNOSIS — Z12.31 VISIT FOR SCREENING MAMMOGRAM: ICD-10-CM

## 2025-02-05 DIAGNOSIS — R06.09 DOE (DYSPNEA ON EXERTION): ICD-10-CM

## 2025-02-05 LAB
DLCO %PRED: 71 %
DLCO PRED: NORMAL
DLCO/VA %PRED: NORMAL
DLCO/VA PRED: NORMAL
DLCO/VA: NORMAL
DLCO: NORMAL
EXPIRATORY TIME-POST: NORMAL
EXPIRATORY TIME: NORMAL
FEF 25-75 %CHNG: NORMAL
FEF 25-75 POST %PRED: NORMAL
FEF 25-75% %PRED-PRE: NORMAL
FEF 25-75% PRED: NORMAL
FEF 25-75-POST: NORMAL
FEF 25-75-PRE: NORMAL
FEV1 %PRED-POST: 84 %
FEV1 %PRED-PRE: 75 %
FEV1 PRED: NORMAL
FEV1-POST: NORMAL
FEV1-PRE: NORMAL
FEV1/FVC %PRED-POST: NORMAL
FEV1/FVC %PRED-PRE: NORMAL
FEV1/FVC PRED: NORMAL
FEV1/FVC-POST: 109 %
FEV1/FVC-PRE: 103 %
FVC %PRED-POST: NORMAL
FVC %PRED-PRE: NORMAL
FVC PRED: NORMAL
FVC-POST: NORMAL
FVC-PRE: NORMAL
GAW %PRED: NORMAL
GAW PRED: NORMAL
GAW: NORMAL
IC PRE %PRED: NORMAL
IC PRED: NORMAL
IC: NORMAL
MEP: NORMAL
MIP: NORMAL
MVV %PRED-PRE: NORMAL
MVV PRED: NORMAL
MVV-PRE: NORMAL
PEF %PRED-POST: NORMAL
PEF %PRED-PRE: NORMAL
PEF PRED: NORMAL
PEF%CHNG: NORMAL
PEF-POST: NORMAL
PEF-PRE: NORMAL
RAW %PRED: NORMAL
RAW PRED: NORMAL
RAW: NORMAL
RV PRE %PRED: NORMAL
RV PRED: NORMAL
RV: NORMAL
SVC %PRED: NORMAL
SVC PRED: NORMAL
SVC: NORMAL
TLC PRE %PRED: 82 %
TLC PRED: NORMAL
TLC: NORMAL
VA %PRED: NORMAL
VA PRED: NORMAL
VA: NORMAL
VTG %PRED: NORMAL
VTG PRED: NORMAL
VTG: NORMAL

## 2025-02-05 PROCEDURE — 77063 BREAST TOMOSYNTHESIS BI: CPT

## 2025-02-05 PROCEDURE — 94726 PLETHYSMOGRAPHY LUNG VOLUMES: CPT

## 2025-02-05 PROCEDURE — 6370000000 HC RX 637 (ALT 250 FOR IP): Performed by: INTERNAL MEDICINE

## 2025-02-05 PROCEDURE — 94729 DIFFUSING CAPACITY: CPT

## 2025-02-05 PROCEDURE — 94060 EVALUATION OF WHEEZING: CPT

## 2025-02-05 PROCEDURE — 94760 N-INVAS EAR/PLS OXIMETRY 1: CPT

## 2025-02-05 RX ORDER — ALBUTEROL SULFATE 90 UG/1
4 INHALANT RESPIRATORY (INHALATION) ONCE
Status: COMPLETED | OUTPATIENT
Start: 2025-02-05 | End: 2025-02-05

## 2025-02-05 RX ADMIN — Medication 4 PUFF: at 13:20

## 2025-02-05 ASSESSMENT — PULMONARY FUNCTION TESTS
FEV1_PERCENT_PREDICTED_PRE: 75
FEV1/FVC_PRE: 103
FEV1_PERCENT_PREDICTED_POST: 84
FEV1/FVC_POST: 109

## 2025-02-06 NOTE — PROCEDURES
Pulmonary Function Testing      Patient name:  Анна Vivar      Unit #:   [id number]   Date of test:  2/5/2025  Date of interpretation:   2/6/2025    Ms. Анна Vivar is a 84 y.o. year-old.  The spirometry data was acceptable and reproducible per the ATS criteria.     Spirometry:  Pre-bronchodilator FVC: 1.62 L, 73 %  Pre-bronchodilator FEV1: 1.22 L, 75 %  FEV1/FVC: 75 %    Post-bronchodilator FVC: 1.70 L, 77 %, +3 % change   Post-bronchodilator FEV1: 1.37 L, 84 %, +9 % change   FEV1/FVC: 80 %    Lung volumes:  TLC: 3.94 L, 82 %  RV: 1.99 L, 84 %    Diffusion capacity:  DLCO: 12.90 ml/mmHg/min, 71 %    Flow Volume loop:  Narrow    Interpretation:    Spirometry is normal  There was no significant bronchodilator response however this should not preclude the use of bronchodilator is clinically indicated  Lung volumes are normal  Diffusion capacity is mildly reduced     Lul Bautista MD   Pulmonary and Critical Care Medicine  [facility]

## 2025-02-28 ENCOUNTER — PATIENT MESSAGE (OUTPATIENT)
Dept: PULMONOLOGY | Age: 85
End: 2025-02-28

## 2025-02-28 ENCOUNTER — PATIENT MESSAGE (OUTPATIENT)
Dept: FAMILY MEDICINE CLINIC | Age: 85
End: 2025-02-28

## 2025-03-12 ENCOUNTER — OFFICE VISIT (OUTPATIENT)
Dept: PULMONOLOGY | Age: 85
End: 2025-03-12
Payer: MEDICARE

## 2025-03-12 VITALS
DIASTOLIC BLOOD PRESSURE: 84 MMHG | HEIGHT: 62 IN | HEART RATE: 82 BPM | WEIGHT: 190.8 LBS | SYSTOLIC BLOOD PRESSURE: 120 MMHG | BODY MASS INDEX: 35.11 KG/M2 | OXYGEN SATURATION: 95 %

## 2025-03-12 DIAGNOSIS — R06.09 DYSPNEA ON EXERTION: Primary | ICD-10-CM

## 2025-03-12 DIAGNOSIS — R06.09 DOE (DYSPNEA ON EXERTION): Primary | ICD-10-CM

## 2025-03-12 PROCEDURE — 1123F ACP DISCUSS/DSCN MKR DOCD: CPT | Performed by: INTERNAL MEDICINE

## 2025-03-12 PROCEDURE — 3074F SYST BP LT 130 MM HG: CPT | Performed by: INTERNAL MEDICINE

## 2025-03-12 PROCEDURE — 1160F RVW MEDS BY RX/DR IN RCRD: CPT | Performed by: INTERNAL MEDICINE

## 2025-03-12 PROCEDURE — 1159F MED LIST DOCD IN RCRD: CPT | Performed by: INTERNAL MEDICINE

## 2025-03-12 PROCEDURE — 99214 OFFICE O/P EST MOD 30 MIN: CPT | Performed by: INTERNAL MEDICINE

## 2025-03-12 PROCEDURE — 3079F DIAST BP 80-89 MM HG: CPT | Performed by: INTERNAL MEDICINE

## 2025-03-12 RX ORDER — BUDESONIDE, GLYCOPYRROLATE, AND FORMOTEROL FUMARATE 160; 9; 4.8 UG/1; UG/1; UG/1
2 AEROSOL, METERED RESPIRATORY (INHALATION) 2 TIMES DAILY
Qty: 2 EACH | Refills: 0 | Status: SHIPPED | COMMUNITY
Start: 2025-03-12

## 2025-03-12 NOTE — PROGRESS NOTES
PULMONARY OFFICE FOLLOW UP NOTE    REASON FOR VISIT:   Chief Complaint   Patient presents with    Shortness of Breath     Stated still having SOB. States she feels something is in her lungs.        DATE OF VISIT: 3/12/2025    HISTORY OF PRESENT ILLNESS: 85 y.o. year old female is here for follow-up of shortness of breath.  She has history of congestive heart failure, CKD stage IIIa, ALEXI on CPAP, history of DVT on anticoagulation     Patient has been noticing dyspnea on exertion over the last 3 to 4 years.  She gets short of breath upon walking long distances and climbing a flight of stairs.  She does get tired after performing daily activities of her life and has to take rest.  She denies any cough but does have feeling of constant clearing her throat along with sinus issues and postnasal drip.  No chest pain or hemoptysis or wheezing.    PFT from February 2025 showed decreased diffusion capacity of 71%, essentially unchanged from 2017.  No evidence of obstruction or restriction.     She has congestive heart failure with preserved EF.  Follows with cardiologist at [facility] and has recently undergone extensive testing that did not show any abnormality.     She has sleep apnea and uses CPAP with nasal pillows and follows with sleep physician at [facility].     She has history of DVT in lower extremities and is on Xarelto for the last 10 years        DIAGNOSTIC TEST REVIEWED:  Patient had PFT performed in August 2017 that showed reduced diffusion capacity of 72%.     Echo from 9/12/2024 at [facility] showed EF of 53%.  Left bundle branch block.  Moderate aortic regurgitation.         REVIEW OF SYSTEMS:   CONSTITUTIONAL SYMPTOMS: The patient denies fever, fatigue, night sweats, weight loss or weight gain.   HEENT: No vision changes. No tinnitus, Denies sinus pain. No hoarseness, or dysphagia.   NECK: Patient denies swelling in the neck.   CARDIOVASCULAR: Denies chest pain, palpitation, syncope.  RESPIRATORY:

## 2025-03-24 ENCOUNTER — TELEPHONE (OUTPATIENT)
Dept: ENDOCRINOLOGY | Age: 85
End: 2025-03-24

## 2025-03-24 ENCOUNTER — TRANSCRIBE ORDERS (OUTPATIENT)
Dept: ADMINISTRATIVE | Age: 85
End: 2025-03-24

## 2025-03-24 DIAGNOSIS — N28.89 OTHER SPECIFIED DISORDERS OF KIDNEY AND URETER: Primary | ICD-10-CM

## 2025-03-25 SDOH — ECONOMIC STABILITY: INCOME INSECURITY: IN THE LAST 12 MONTHS, WAS THERE A TIME WHEN YOU WERE NOT ABLE TO PAY THE MORTGAGE OR RENT ON TIME?: NO

## 2025-03-25 SDOH — ECONOMIC STABILITY: FOOD INSECURITY: WITHIN THE PAST 12 MONTHS, YOU WORRIED THAT YOUR FOOD WOULD RUN OUT BEFORE YOU GOT MONEY TO BUY MORE.: NEVER TRUE

## 2025-03-25 SDOH — ECONOMIC STABILITY: FOOD INSECURITY: WITHIN THE PAST 12 MONTHS, THE FOOD YOU BOUGHT JUST DIDN'T LAST AND YOU DIDN'T HAVE MONEY TO GET MORE.: NEVER TRUE

## 2025-03-25 SDOH — ECONOMIC STABILITY: TRANSPORTATION INSECURITY
IN THE PAST 12 MONTHS, HAS THE LACK OF TRANSPORTATION KEPT YOU FROM MEDICAL APPOINTMENTS OR FROM GETTING MEDICATIONS?: NO

## 2025-03-25 ASSESSMENT — PATIENT HEALTH QUESTIONNAIRE - PHQ9
SUM OF ALL RESPONSES TO PHQ QUESTIONS 1-9: 0
1. LITTLE INTEREST OR PLEASURE IN DOING THINGS: NOT AT ALL
SUM OF ALL RESPONSES TO PHQ QUESTIONS 1-9: 0
2. FEELING DOWN, DEPRESSED OR HOPELESS: NOT AT ALL
SUM OF ALL RESPONSES TO PHQ9 QUESTIONS 1 & 2: 0
SUM OF ALL RESPONSES TO PHQ QUESTIONS 1-9: 0
1. LITTLE INTEREST OR PLEASURE IN DOING THINGS: NOT AT ALL
SUM OF ALL RESPONSES TO PHQ QUESTIONS 1-9: 0
2. FEELING DOWN, DEPRESSED OR HOPELESS: NOT AT ALL

## 2025-03-28 ENCOUNTER — OFFICE VISIT (OUTPATIENT)
Dept: FAMILY MEDICINE CLINIC | Age: 85
End: 2025-03-28

## 2025-03-28 VITALS
OXYGEN SATURATION: 94 % | HEART RATE: 71 BPM | SYSTOLIC BLOOD PRESSURE: 130 MMHG | RESPIRATION RATE: 20 BRPM | TEMPERATURE: 97.5 F | DIASTOLIC BLOOD PRESSURE: 84 MMHG | BODY MASS INDEX: 35.01 KG/M2 | WEIGHT: 191.4 LBS

## 2025-03-28 DIAGNOSIS — I10 ESSENTIAL HYPERTENSION: ICD-10-CM

## 2025-03-28 DIAGNOSIS — I82.409 RECURRENT DEEP VEIN THROMBOSIS (DVT) (HCC): ICD-10-CM

## 2025-03-28 DIAGNOSIS — R73.03 PREDIABETES: ICD-10-CM

## 2025-03-28 DIAGNOSIS — N18.31 STAGE 3A CHRONIC KIDNEY DISEASE (HCC): ICD-10-CM

## 2025-03-28 DIAGNOSIS — Z85.528 H/O RENAL CELL CARCINOMA: ICD-10-CM

## 2025-03-28 DIAGNOSIS — R06.09 DYSPNEA ON EXERTION: ICD-10-CM

## 2025-03-28 DIAGNOSIS — E03.9 ACQUIRED HYPOTHYROIDISM: ICD-10-CM

## 2025-03-28 DIAGNOSIS — I50.42 CHRONIC COMBINED SYSTOLIC AND DIASTOLIC HEART FAILURE (HCC): ICD-10-CM

## 2025-03-28 DIAGNOSIS — E21.3 HYPERPARATHYROIDISM: ICD-10-CM

## 2025-03-28 DIAGNOSIS — M15.0 PRIMARY OSTEOARTHRITIS INVOLVING MULTIPLE JOINTS: Primary | ICD-10-CM

## 2025-03-28 DIAGNOSIS — M81.0 AGE-RELATED OSTEOPOROSIS WITHOUT CURRENT PATHOLOGICAL FRACTURE: ICD-10-CM

## 2025-03-28 RX ORDER — RIVAROXABAN 10 MG/1
10 TABLET, FILM COATED ORAL DAILY
Qty: 100 TABLET | Refills: 2 | Status: SHIPPED | OUTPATIENT
Start: 2025-03-28

## 2025-03-28 RX ORDER — GABAPENTIN 100 MG/1
100 CAPSULE ORAL NIGHTLY
Qty: 90 CAPSULE | Refills: 1 | Status: SHIPPED | OUTPATIENT
Start: 2025-03-28 | End: 2025-09-24

## 2025-03-28 NOTE — PROGRESS NOTES
Office Note  3/28/2025  Patient Name: Анна Vivar  MRN: [id number] : 1940    SUBJECTIVE:     CHIEF COMPLAINT:  Chief Complaint   Patient presents with    Follow-up     Sore pain in left hip and knee.  Gotten worse in the last month.        HISTORY OF PRESENT ILLNESS:  She presents today with the following concerns:      History of Present Illness  The patient presents for evaluation of hip pain, knee pain, breathing issues, and medication management.    Chronic hip pain is reported, attributed to a previously diagnosed compression fracture in the back. Initially, the pain was bilateral but is now localized to one side, causing significant discomfort during sleep. Gabapentin has been used for management, providing relief when the pain was bilateral. Consideration has been given to increasing the gabapentin dosage to two tablets, but there are concerns about potential side effects. Steroid injections have been received in the hip and groin in the past.    Knee discomfort, particularly when lying on the right side, is also reported. There is a history of falls, having fallen on both knees twice and on one knee an additional time. Joint replacement surgery is not being considered due to a known blood clotting disorder. Walking within the condo provides some relief for hip pain but not for knee pain. Voltaren gel has not been tried for symptoms. Steroid injections have been received in the knee prior to the falls.    Pulm:   \"Patient has been experiencing progressive dyspnea on exertion.  She does not have significant smoking history which rules out COPD. Possibility of asthma as she has significant sinus congestion and postnasal drip. PFTs did not show any evidence of obstruction or restriction. Samples of Breztri inhaler given to the patient.  If patient's shortness of breath improves with using inhaler, then she will need ICS/LABA inhaler.\" Patient reports Breztri caused significant leg cramps and chest

## 2025-04-04 ENCOUNTER — PATIENT MESSAGE (OUTPATIENT)
Dept: FAMILY MEDICINE CLINIC | Age: 85
End: 2025-04-04

## 2025-04-04 DIAGNOSIS — I82.409 RECURRENT DEEP VEIN THROMBOSIS (DVT) (HCC): ICD-10-CM

## 2025-04-04 RX ORDER — RIVAROXABAN 10 MG/1
10 TABLET, FILM COATED ORAL DAILY
Qty: 100 TABLET | Refills: 2 | Status: SHIPPED | OUTPATIENT
Start: 2025-04-04

## 2025-04-07 ENCOUNTER — PATIENT MESSAGE (OUTPATIENT)
Dept: FAMILY MEDICINE CLINIC | Age: 85
End: 2025-04-07

## 2025-04-07 RX ORDER — METHYLPREDNISOLONE 4 MG/1
TABLET ORAL
Qty: 1 KIT | Refills: 0 | Status: SHIPPED | OUTPATIENT
Start: 2025-04-07 | End: 2025-04-13

## 2025-04-08 ENCOUNTER — HOSPITAL ENCOUNTER (OUTPATIENT)
Dept: ULTRASOUND IMAGING | Age: 85
Discharge: HOME | End: 2025-04-08
Attending: UROLOGY
Payer: MEDICARE

## 2025-04-08 DIAGNOSIS — N28.89 OTHER SPECIFIED DISORDERS OF KIDNEY AND URETER: ICD-10-CM

## 2025-04-08 PROCEDURE — 76770 US EXAM ABDO BACK WALL COMP: CPT

## 2025-04-14 ENCOUNTER — TRANSCRIBE ORDERS (OUTPATIENT)
Dept: ADMINISTRATIVE | Age: 85
End: 2025-04-14

## 2025-04-14 DIAGNOSIS — N28.89 OTHER SPECIFIED DISORDERS OF KIDNEY AND URETER: Primary | ICD-10-CM

## 2025-04-24 DIAGNOSIS — E03.9 ACQUIRED HYPOTHYROIDISM: ICD-10-CM

## 2025-04-24 DIAGNOSIS — E21.3 HYPERPARATHYROIDISM: ICD-10-CM

## 2025-04-24 LAB
25(OH)D3 SERPL-MCNC: 44.9 NG/ML
ALBUMIN SERPL-MCNC: 3.8 G/DL (ref 3.4–5)
ALBUMIN/GLOB SERPL: 1.4 {RATIO} (ref 1.1–2.2)
ALP SERPL-CCNC: 104 U/L (ref 40–129)
ALT SERPL-CCNC: 23 U/L (ref 10–40)
ANION GAP SERPL CALCULATED.3IONS-SCNC: 11 MMOL/L (ref 3–16)
AST SERPL-CCNC: 24 U/L (ref 15–37)
BILIRUB SERPL-MCNC: 0.3 MG/DL (ref 0–1)
BUN SERPL-MCNC: 18 MG/DL (ref 7–20)
CALCIUM SERPL-MCNC: 9.9 MG/DL (ref 8.3–10.6)
CHLORIDE SERPL-SCNC: 101 MMOL/L (ref 99–110)
CO2 SERPL-SCNC: 25 MMOL/L (ref 21–32)
CREAT SERPL-MCNC: 1 MG/DL (ref 0.6–1.2)
GFR SERPLBLD CREATININE-BSD FMLA CKD-EPI: 55 ML/MIN/{1.73_M2}
GLUCOSE SERPL-MCNC: 101 MG/DL (ref 70–99)
PHOSPHATE SERPL-MCNC: 4.2 MG/DL (ref 2.5–4.9)
POTASSIUM SERPL-SCNC: 4.1 MMOL/L (ref 3.5–5.1)
PROT SERPL-MCNC: 6.5 G/DL (ref 6.4–8.2)
PTH-INTACT SERPL-MCNC: 44.7 PG/ML (ref 14–72)
SODIUM SERPL-SCNC: 137 MMOL/L (ref 136–145)
TSH SERPL DL<=0.005 MIU/L-ACNC: 1.62 UIU/ML (ref 0.27–4.2)

## 2025-04-26 ENCOUNTER — HOSPITAL ENCOUNTER (OUTPATIENT)
Dept: MRI IMAGING | Age: 85
Discharge: HOME | End: 2025-04-26
Attending: UROLOGY
Payer: MEDICARE

## 2025-04-26 DIAGNOSIS — N28.89 OTHER SPECIFIED DISORDERS OF KIDNEY AND URETER: ICD-10-CM

## 2025-04-26 PROCEDURE — 74181 MRI ABDOMEN W/O CONTRAST: CPT

## 2025-04-30 ENCOUNTER — OFFICE VISIT (OUTPATIENT)
Dept: ENDOCRINOLOGY | Age: 85
End: 2025-04-30
Payer: MEDICARE

## 2025-04-30 ENCOUNTER — CLINICAL SUPPORT (OUTPATIENT)
Dept: ENDOCRINOLOGY | Age: 85
End: 2025-04-30

## 2025-04-30 VITALS
DIASTOLIC BLOOD PRESSURE: 68 MMHG | HEART RATE: 88 BPM | BODY MASS INDEX: 34.96 KG/M2 | SYSTOLIC BLOOD PRESSURE: 134 MMHG | TEMPERATURE: 98 F | RESPIRATION RATE: 14 BRPM | WEIGHT: 190 LBS | HEIGHT: 62 IN

## 2025-04-30 DIAGNOSIS — M15.0 PRIMARY OSTEOARTHRITIS INVOLVING MULTIPLE JOINTS: ICD-10-CM

## 2025-04-30 DIAGNOSIS — M81.0 AGE-RELATED OSTEOPOROSIS WITHOUT CURRENT PATHOLOGICAL FRACTURE: Primary | ICD-10-CM

## 2025-04-30 DIAGNOSIS — E21.3 HYPERPARATHYROIDISM: ICD-10-CM

## 2025-04-30 DIAGNOSIS — E03.9 ACQUIRED HYPOTHYROIDISM: ICD-10-CM

## 2025-04-30 PROCEDURE — 3075F SYST BP GE 130 - 139MM HG: CPT | Performed by: INTERNAL MEDICINE

## 2025-04-30 PROCEDURE — 99214 OFFICE O/P EST MOD 30 MIN: CPT | Performed by: INTERNAL MEDICINE

## 2025-04-30 PROCEDURE — 1123F ACP DISCUSS/DSCN MKR DOCD: CPT | Performed by: INTERNAL MEDICINE

## 2025-04-30 PROCEDURE — 1159F MED LIST DOCD IN RCRD: CPT | Performed by: INTERNAL MEDICINE

## 2025-04-30 PROCEDURE — 96372 THER/PROPH/DIAG INJ SC/IM: CPT | Performed by: INTERNAL MEDICINE

## 2025-04-30 PROCEDURE — 1160F RVW MEDS BY RX/DR IN RCRD: CPT | Performed by: INTERNAL MEDICINE

## 2025-04-30 PROCEDURE — 3078F DIAST BP <80 MM HG: CPT | Performed by: INTERNAL MEDICINE

## 2025-04-30 NOTE — PROGRESS NOTES
Prolia supplied by the physician office.Informed patient if any signs of redness,rash,swelling or unusual symptoms occur, please contact the office. Prolia given per physician order.    LOT: [id number]  EXP: 07/31/27  NDC: 11503-362-30

## 2025-04-30 NOTE — PROGRESS NOTES
Анна Vivar is a 85 y.o. female seen for for hypothyroidism, secondary hyperparathyroidism and osteoporosis.   Patient was diagnosed with Hypothyroidism approximately May 1998 at the time of diagnosis she was having ringing in her ears and her workup showed a goiter which led to her diagnosis   Current complaints: denies fatigue, weight changes, heat/cold intolerance, bowel/skin changes or CVS symptoms. She recalls her thyroid fx test were slightly abnormal   History of obstructive symptoms: difficulty swallowing No, changes in voice/hoarseness No.    History of radiation to patient's neck: NO  Recent iodine exposure: No  Family history includes no thyroid abnormalities.  Family history of thyroid cancer: No    She has Esophageal stricture and Hiatal Hernia and also has GERD and is on Zantac   She takes 100 mg of gabapentin at night for hip pain   She has been diagnosed with Osteopenia and takes calcium and Vitamin D   Her last DEXA was in 11/2017 with DR Corea and she is noted to have osteopenia   She had renal cell carcinoma and is s/p left nephrectomy she follows with Urologist   She has hx of DVT and is on Xarelto     Patient started taking 90 mg of Crozier Thyroid on May 23, 2019.  Previously she was on approximately 88 mcg of Nature-Throid.  She started having palpitations and of July and was noted to have a TSH of 0.03.  She had also joined weight watchers in June and had lost 13 pounds she has been taking her medication regularly.  When she was having the episode of palpitation she was advised to reduce the dose to half a tablet of 90 mg she feels tired and fatigued again.    She has lost 30  lbs from  June 2019 to jan 2020.  Osteoporosis diagnosed in in aug 2020 and started on Prolia by PCP   She started taking calcium citrate twice daily since   March 2023 and Now PTH has normalized     INTERIM     She had a fall but did not fracture   She denies any fractures she denies any kidney stones        Past  7 (severe pain)

## 2025-07-16 ENCOUNTER — APPOINTMENT (OUTPATIENT)
Dept: CT IMAGING | Age: 85
End: 2025-07-16
Payer: MEDICARE

## 2025-07-16 ENCOUNTER — HOSPITAL ENCOUNTER (EMERGENCY)
Age: 85
Discharge: HOME | End: 2025-07-16
Attending: STUDENT IN AN ORGANIZED HEALTH CARE EDUCATION/TRAINING PROGRAM
Payer: MEDICARE

## 2025-07-16 VITALS
HEART RATE: 92 BPM | DIASTOLIC BLOOD PRESSURE: 78 MMHG | WEIGHT: 190 LBS | TEMPERATURE: 97.6 F | RESPIRATION RATE: 18 BRPM | OXYGEN SATURATION: 92 % | BODY MASS INDEX: 34.96 KG/M2 | SYSTOLIC BLOOD PRESSURE: 125 MMHG | HEIGHT: 62 IN

## 2025-07-16 DIAGNOSIS — E27.8 ADRENAL MASS: ICD-10-CM

## 2025-07-16 DIAGNOSIS — N83.8 OVARIAN MASS, LEFT: ICD-10-CM

## 2025-07-16 DIAGNOSIS — R91.8 LUNG MASS: ICD-10-CM

## 2025-07-16 DIAGNOSIS — K57.32 DIVERTICULITIS OF COLON: Primary | ICD-10-CM

## 2025-07-16 LAB
ABO/RH: NORMAL
ALBUMIN SERPL-MCNC: 3.7 G/DL (ref 3.4–5)
ALBUMIN/GLOB SERPL: 1.1 {RATIO} (ref 1.1–2.2)
ALP SERPL-CCNC: 86 U/L (ref 40–129)
ALT SERPL-CCNC: 18 U/L (ref 10–40)
ANION GAP SERPL CALCULATED.3IONS-SCNC: 11 MMOL/L (ref 3–16)
ANTIBODY SCREEN: NORMAL
AST SERPL-CCNC: 28 U/L (ref 15–37)
BACTERIA URNS QL MICRO: ABNORMAL /HPF
BASOPHILS # BLD: 0 K/UL (ref 0–0.2)
BASOPHILS NFR BLD: 0 %
BILIRUB SERPL-MCNC: 0.6 MG/DL (ref 0–1)
BILIRUB UR QL STRIP.AUTO: NEGATIVE
BUN SERPL-MCNC: 15 MG/DL (ref 7–20)
CALCIUM SERPL-MCNC: 9.7 MG/DL (ref 8.3–10.6)
CHLORIDE SERPL-SCNC: 102 MMOL/L (ref 99–110)
CLARITY UR: CLEAR
CO2 SERPL-SCNC: 23 MMOL/L (ref 21–32)
COLOR UR: YELLOW
CREAT SERPL-MCNC: 1 MG/DL (ref 0.6–1.2)
DEPRECATED RDW RBC AUTO: 14.3 % (ref 12.4–15.4)
EOSINOPHIL # BLD: 0.1 K/UL (ref 0–0.6)
EOSINOPHIL NFR BLD: 1 %
EPI CELLS #/AREA URNS AUTO: 5 /HPF (ref 0–5)
GFR SERPLBLD CREATININE-BSD FMLA CKD-EPI: 55 ML/MIN/{1.73_M2}
GLUCOSE SERPL-MCNC: 97 MG/DL (ref 70–99)
GLUCOSE UR STRIP.AUTO-MCNC: NEGATIVE MG/DL
HCT VFR BLD AUTO: 42.2 % (ref 36–48)
HGB BLD-MCNC: 14.1 G/DL (ref 12–16)
HGB UR QL STRIP.AUTO: NEGATIVE
HYALINE CASTS #/AREA URNS AUTO: 0 /LPF (ref 0–8)
INR PPP: 1.39 (ref 0.86–1.14)
KETONES UR STRIP.AUTO-MCNC: NEGATIVE MG/DL
LACTATE BLDV-SCNC: 1.2 MMOL/L (ref 0.4–1.9)
LEUKOCYTE ESTERASE UR QL STRIP.AUTO: ABNORMAL
LIPASE SERPL-CCNC: 28 U/L (ref 13–60)
LYMPHOCYTES # BLD: 2.6 K/UL (ref 1–5.1)
LYMPHOCYTES NFR BLD: 26 %
MCH RBC QN AUTO: 30.6 PG (ref 26–34)
MCHC RBC AUTO-ENTMCNC: 33.4 G/DL (ref 31–36)
MCV RBC AUTO: 91.6 FL (ref 80–100)
MONOCYTES # BLD: 1.1 K/UL (ref 0–1.3)
MONOCYTES NFR BLD: 11 %
NEUTROPHILS # BLD: 6.2 K/UL (ref 1.7–7.7)
NEUTROPHILS NFR BLD: 62 %
NITRITE UR QL STRIP.AUTO: NEGATIVE
PATH INTERP BLD-IMP: NO
PH UR STRIP.AUTO: 6 [PH] (ref 5–8)
PLATELET # BLD AUTO: 150 K/UL (ref 135–450)
PLATELET BLD QL SMEAR: ADEQUATE
PMV BLD AUTO: 9.3 FL (ref 5–10.5)
POTASSIUM SERPL-SCNC: 4.6 MMOL/L (ref 3.5–5.1)
PROT SERPL-MCNC: 7.1 G/DL (ref 6.4–8.2)
PROT UR STRIP.AUTO-MCNC: NEGATIVE MG/DL
PROTHROMBIN TIME: 17.2 SEC (ref 12.1–14.9)
RBC # BLD AUTO: 4.61 M/UL (ref 4–5.2)
RBC CLUMPS #/AREA URNS AUTO: 1 /HPF (ref 0–4)
RBC MORPH BLD: NORMAL
SLIDE REVIEW: NORMAL
SODIUM SERPL-SCNC: 136 MMOL/L (ref 136–145)
SP GR UR STRIP.AUTO: 1.01 (ref 1–1.03)
UA COMPLETE W REFLEX CULTURE PNL UR: YES
UA DIPSTICK W REFLEX MICRO PNL UR: YES
URN SPEC COLLECT METH UR: ABNORMAL
UROBILINOGEN UR STRIP-ACNC: 0.2 E.U./DL
WBC # BLD AUTO: 10 K/UL (ref 4–11)
WBC #/AREA URNS AUTO: 12 /HPF (ref 0–5)

## 2025-07-16 PROCEDURE — 86901 BLOOD TYPING SEROLOGIC RH(D): CPT

## 2025-07-16 PROCEDURE — 81001 URINALYSIS AUTO W/SCOPE: CPT

## 2025-07-16 PROCEDURE — 86900 BLOOD TYPING SEROLOGIC ABO: CPT

## 2025-07-16 PROCEDURE — 74176 CT ABD & PELVIS W/O CONTRAST: CPT

## 2025-07-16 PROCEDURE — 83690 ASSAY OF LIPASE: CPT

## 2025-07-16 PROCEDURE — 87077 CULTURE AEROBIC IDENTIFY: CPT

## 2025-07-16 PROCEDURE — 86850 RBC ANTIBODY SCREEN: CPT

## 2025-07-16 PROCEDURE — 6360000004 HC RX CONTRAST MEDICATION: Performed by: NURSE PRACTITIONER

## 2025-07-16 PROCEDURE — 87086 URINE CULTURE/COLONY COUNT: CPT

## 2025-07-16 PROCEDURE — 99284 EMERGENCY DEPT VISIT MOD MDM: CPT

## 2025-07-16 PROCEDURE — 80053 COMPREHEN METABOLIC PANEL: CPT

## 2025-07-16 PROCEDURE — 87186 SC STD MICRODIL/AGAR DIL: CPT

## 2025-07-16 PROCEDURE — 85025 COMPLETE CBC W/AUTO DIFF WBC: CPT

## 2025-07-16 PROCEDURE — 85610 PROTHROMBIN TIME: CPT

## 2025-07-16 PROCEDURE — 83605 ASSAY OF LACTIC ACID: CPT

## 2025-07-16 RX ORDER — METRONIDAZOLE 500 MG/1
500 TABLET ORAL 3 TIMES DAILY
Qty: 42 TABLET | Refills: 0 | Status: SHIPPED | OUTPATIENT
Start: 2025-07-16 | End: 2025-07-30

## 2025-07-16 RX ORDER — CIPROFLOXACIN 500 MG/1
500 TABLET, FILM COATED ORAL 2 TIMES DAILY
Qty: 28 TABLET | Refills: 0 | Status: SHIPPED | OUTPATIENT
Start: 2025-07-16 | End: 2025-07-30

## 2025-07-16 RX ORDER — IOPAMIDOL 755 MG/ML
75 INJECTION, SOLUTION INTRAVASCULAR
Status: DISCONTINUED | OUTPATIENT
Start: 2025-07-16 | End: 2025-07-16

## 2025-07-16 ASSESSMENT — LIFESTYLE VARIABLES
HOW MANY STANDARD DRINKS CONTAINING ALCOHOL DO YOU HAVE ON A TYPICAL DAY: PATIENT DOES NOT DRINK
HOW OFTEN DO YOU HAVE A DRINK CONTAINING ALCOHOL: NEVER

## 2025-07-16 ASSESSMENT — ENCOUNTER SYMPTOMS
ABDOMINAL PAIN: 1
BLOOD IN STOOL: 1
DIARRHEA: 1
NAUSEA: 1
CHEST TIGHTNESS: 0
SHORTNESS OF BREATH: 0
VOMITING: 0

## 2025-07-16 ASSESSMENT — PAIN - FUNCTIONAL ASSESSMENT: PAIN_FUNCTIONAL_ASSESSMENT: NONE - DENIES PAIN

## 2025-07-16 NOTE — DISCHARGE INSTRUCTIONS
Ovarian Growth    Your x-ray, CT scan, or ultrasound shows a growth (\"mass\" or “cyst”) on your ovary.  This will require follow-up for further evaluation.    Please call your Primary Care Physician (PCP) if you have already established one and you confirmed your PCP during your ER visit today. If you do not have a PCP, please call the [facility] scheduling number to schedule your Emergency Department follow up visit. Please mention that you are scheduling an \"ER follow up visit\" for an ovarian growth.      Learning About Ovarian Growths  Ovarian growths are abnormal growths in or on the ovaries. The growth can be a cyst, which is a fluid-filled sac, or a mass (neoplasm), which is a more solid growth.  You may have tests to see if the cyst/growth is caused by cancer. Most of these growths are not cancerous (benign) and don't cause symptoms. If you have symptoms, they may include pain in the belly or pelvis, pain during your period, and abnormal bleeding.  If after your follow-up physician has examined you and it is determined that you have a non-cancerous ovarian growth, then most women in conjunction with their doctors often choose to watch these types of growths closely over time but not to treat them. This is called watchful waiting. Your doctor may prescribe medicine to help with any symptoms.  In some cases, noncancerous growths may need to be removed using surgery.  Follow-up care is a key part of your treatment and safety. Be sure to make and go to all appointments, and call your doctor if you are having problems. It's also a good idea to know your test results and keep a list of the medicines you take.    What is the next step in evaluation of an ovarian cyst?  Below are the recommended guidelines for management of ovarian growth detected by CT/MRI, you can discuss these recommendations at your follow-up appointment:     Premenopausal (you are less than 50 years of age, or last menstrual period

## 2025-07-16 NOTE — ED PROVIDER NOTES
[facility] EMERGENCY DEPARTMENT  EMERGENCY DEPARTMENT ENCOUNTER        Pt Name: Анна Vivar  MRN: [id number]  Birthdate 1940  Date of evaluation: 7/16/2025  Provider: LEIDA Gan - CNP  PCP: Ni Farr DO  Note Started: 4:05 PM EDT 7/16/25       I have seen and evaluated this patient with my supervising physician briana      CHIEF COMPLAINT       Chief Complaint   Patient presents with    Abdominal Cramping     PT arrives via self from home C/O abd cramping, loose stools, and one bowel movement with blood.        HISTORY OF PRESENT ILLNESS: 1 or more Elements     History From: patient  Limitations to history : None    Анна Vivar is a 85 y.o. female who presents to the emergency department with complaint of lower abdominal pain with loose stools and 1 episode of stool this morning containing blood.  The patient reports that the blood was mixed in the stool, red.  She is anticoagulated on Xarelto.  She did call GI and was sent to the emergency department for CT scan and blood work.    Denies any headache, fever, lightheadedness, dizziness, visual disturbances.  No chest pain or pressure.  No neck or back pain.  No shortness of breath, cough, or congestion.  No vomiting, diarrhea, constipation.  No rash.    Nursing Notes were all reviewed and agreed with or any disagreements were addressed in the HPI.    REVIEW OF SYSTEMS :      Review of Systems   Constitutional:  Negative for activity change, chills and fever.   Respiratory:  Negative for chest tightness and shortness of breath.    Cardiovascular:  Negative for chest pain.   Gastrointestinal:  Positive for abdominal pain, blood in stool, diarrhea and nausea. Negative for vomiting.   Genitourinary:  Negative for dysuria.   All other systems reviewed and are negative.      Positives and Pertinent negatives as per HPI.     SURGICAL HISTORY     Past Surgical History:   Procedure Laterality Date    APPENDECTOMY      BREAST

## 2025-07-16 NOTE — ED PROVIDER NOTES
I have personally performed a face to face diagnostic evaluation on this patient. I have fully participated in the care of this patient I personally saw the patient and performed a substantive portion of the visit including all aspects of the medical decision making.  I have reviewed and agree with all pertinent clinical information including history, physical exam, diagnostic tests, and the plan.    Medical Decision Making  Patient endorses left lower quadrant abdominal pain x 2 days associated with loose stool.  Today she had stool streaked with bright red blood.  Denies active extravasation from her rectum.  Is anticoagulated on Xarelto.  She has history of nephrectomy.  History of adrenal mass followed by endocrinology.  Also follows with endocrinology for hypothyroid state.    On exam she tolerates deep palpation to all 4 quadrants.  Has tenderness left lower quadrant without rebound or guarding.  Cardiac regular rate and rhythm.  No increased work of breathing.  No hypoxia on room air.      CT abdomen pelvis is showing uncomplicated diverticulitis at the rectosigmoid colon which correlates with her area of pain.  She does not meet SIRS criteria.  No fever here without antipyretic use recently.  Hemoglobin is stable.  I would not recommend discontinuing anticoagulation therapy at this time.  We discussed inpatient versus outpatient antibiotic therapy.  Shared decision making was had, patient agreeable to trial outpatient oral antibiotics today with strict abdominal pain return precautions.  I believe this is appropriate.  We did discuss incidental findings as well on her CT abdomen pelvis.  We discussed incidental pulmonary nodules which will need CT chest follow-up by her primary care doctor.  She does have risk factors of former smoker.  We also discussed left-sided ovarian cyst which will need follow-up as well.  Currently I low suspicion for ovarian torsion.  She is aware of stable bilateral adrenal

## 2025-07-18 LAB
BACTERIA UR CULT: ABNORMAL
BACTERIA UR CULT: ABNORMAL
ORGANISM: ABNORMAL

## 2025-07-25 ENCOUNTER — TELEPHONE (OUTPATIENT)
Dept: PULMONOLOGY | Age: 85
End: 2025-07-25

## 2025-07-25 DIAGNOSIS — R91.8 INDETERMINATE PULMONARY NODULES: Primary | ICD-10-CM

## 2025-07-25 NOTE — TELEPHONE ENCOUNTER
Spoke to pt and gave her the results. I also ordered the CT chest to be performed mid October. Scheduled the CT chest on 10/10/25 and r/s her f/u with Dr Diaz for 10/15/25 to go over the repeat scan.

## 2025-07-25 NOTE — TELEPHONE ENCOUNTER
----- Message from Dr. Abigail Diaz MD sent at 7/25/2025 12:51 PM EDT -----  CT abdomen reviewed.  Patient will need repeat CT chest without contrast in 3 months for follow-up of these lung nodules.  Please schedule.  ----- Message -----  From: Nallely Borrego RN  Sent: 7/23/2025   6:41 PM EDT  To: Abigail Daiz MD    7/23/2025    Dr. Diaz,    Please review Анна Macariot 's incidental pulmonary finding and recommendations from their 7/16/25 CT from [facility].  She has an appt with you on 9/15/25.    Thanks    Nallely Borrego  [facility] Lung Navigator  Lung Cancer Screening Program  [phone]

## 2025-07-30 NOTE — PROGRESS NOTES
Office Note  2025  Patient Name: Анна Vivar  MRN: [id number] : 1940    SUBJECTIVE:     CHIEF COMPLAINT:  Chief Complaint   Patient presents with    Frequent/Recurrent UTI     Burning 1yr or more, no frequency, no dysuria. Recent diverticulitis, tx Cipro & Metronidazole.        HISTORY OF PRESENT ILLNESS:  She presents today with the following concerns:    History of Present Illness  The patient presents for evaluation of UTI, ovarian cyst, and fatigue.    Urinary Tract Infection (UTI)  - Persistent bladder burning diagnosed as UTI by Dr. Garcia after two urinalyses.  - Prescribed Cipro twice.  - Consulted a nurse practitioner 1.5 months ago who recommended an antibiotic and urine culture.  - Advised to discontinue antibiotic if culture showed no infection, but she continued it.  - Two weeks ago, experienced diverticula and cramping, visited ER as advised by gastroenterologist.  - CT scan revealed several issues including diverticulitis.  - Prescribed Cipro 500 mg twice daily and metronidazole for 14 days.  - Currently, burning sensation recurs but less severe.   - ER urine culture showed resistance to Cipro.  - Contemplating probiotic as alternative.  - Canceled urethral dilation and biopsy scheduled for 2025 due to UTI.  - Curious if procedures are necessary if infection resolves.    Fatigue  - Appointment with cardiologist in 3 months.  - Reports feeling unwell at times, began after COVID-19 vaccine.  - Felt fine yesterday afternoon, but after errands, felt extremely fatigued.  - Blood pressure 111/70s, heart rate 96.  - Experiences this weekly.  - Describes heaviness in chest and unusual stomach sensation, not nausea.  - No pain or lightheadedness, but short of breath.  - Worn Holter monitor several times.  - Often tired upon waking, even with CPAP.  - Energy levels decreased over past few years, particularly since first COVID-19 vaccine.  - Had COVID-19 twice, health declined

## 2025-07-31 ENCOUNTER — OFFICE VISIT (OUTPATIENT)
Dept: FAMILY MEDICINE CLINIC | Age: 85
End: 2025-07-31

## 2025-07-31 VITALS
WEIGHT: 193 LBS | HEART RATE: 87 BPM | BODY MASS INDEX: 35.3 KG/M2 | TEMPERATURE: 96.3 F | DIASTOLIC BLOOD PRESSURE: 63 MMHG | OXYGEN SATURATION: 95 % | SYSTOLIC BLOOD PRESSURE: 118 MMHG

## 2025-07-31 DIAGNOSIS — R00.2 HEART PALPITATIONS: ICD-10-CM

## 2025-07-31 DIAGNOSIS — N83.202 LEFT OVARIAN CYST: ICD-10-CM

## 2025-07-31 DIAGNOSIS — N30.00 ACUTE CYSTITIS WITHOUT HEMATURIA: ICD-10-CM

## 2025-07-31 DIAGNOSIS — Z80.41 FAMILY HISTORY OF OVARIAN CANCER: ICD-10-CM

## 2025-07-31 DIAGNOSIS — R30.0 BURNING WITH URINATION: Primary | ICD-10-CM

## 2025-07-31 DIAGNOSIS — R53.83 FATIGUE, UNSPECIFIED TYPE: ICD-10-CM

## 2025-07-31 PROBLEM — E66.01 SEVERE OBESITY (BMI 35.0-39.9) WITH COMORBIDITY (HCC): Status: RESOLVED | Noted: 2023-03-28 | Resolved: 2025-07-31

## 2025-07-31 LAB
BILIRUBIN, POC: NEGATIVE
BLOOD URINE, POC: NEGATIVE
CLARITY, POC: CLEAR
COLOR, POC: YELLOW
GLUCOSE URINE, POC: NORMAL MG/DL
KETONES, POC: NEGATIVE MG/DL
LEUKOCYTE EST, POC: NORMAL
NITRITE, POC: NEGATIVE
PH, POC: 6
PROTEIN, POC: NEGATIVE MG/DL
SPECIFIC GRAVITY, POC: 1
UROBILINOGEN, POC: 0.2 MG/DL

## 2025-07-31 RX ORDER — CEFUROXIME AXETIL 500 MG/1
500 TABLET ORAL 2 TIMES DAILY
Qty: 10 TABLET | Refills: 0 | Status: SHIPPED | OUTPATIENT
Start: 2025-07-31 | End: 2025-08-05

## 2025-07-31 SDOH — ECONOMIC STABILITY: FOOD INSECURITY: WITHIN THE PAST 12 MONTHS, THE FOOD YOU BOUGHT JUST DIDN'T LAST AND YOU DIDN'T HAVE MONEY TO GET MORE.: NEVER TRUE

## 2025-07-31 SDOH — ECONOMIC STABILITY: FOOD INSECURITY: WITHIN THE PAST 12 MONTHS, YOU WORRIED THAT YOUR FOOD WOULD RUN OUT BEFORE YOU GOT MONEY TO BUY MORE.: NEVER TRUE

## (undated) DEVICE — SET VLV 3 PC AWS DISPOSABLE GRDIAN SCOPEVALET

## (undated) DEVICE — GOWN AURORA NONREINF LG: Brand: MEDLINE INDUSTRIES, INC.

## (undated) DEVICE — SOLUTION IV IRRIG WATER 500ML POUR BRL ST 2F7113

## (undated) DEVICE — DILATOR ENDOSCP L180CM DIA6FR BLLN L8CM DIA54-60FR

## (undated) DEVICE — PROCEDURE KIT ENDOSCP CUST

## (undated) DEVICE — BW-412T DISP COMBO CLEANING BRUSH: Brand: SINGLE USE COMBINATION CLEANING BRUSH

## (undated) DEVICE — SYRINGE INFL 60ML DISP ALLIANCE II

## (undated) DEVICE — FORCEPS BX L240CM WRK CHN 2.8MM STD CAP W/ NDL MIC MESH

## (undated) DEVICE — MOUTHPIECE ENDOSCP L CTRL OPN AND SIDE PORTS DISP